# Patient Record
Sex: MALE | Race: WHITE | NOT HISPANIC OR LATINO | URBAN - METROPOLITAN AREA
[De-identification: names, ages, dates, MRNs, and addresses within clinical notes are randomized per-mention and may not be internally consistent; named-entity substitution may affect disease eponyms.]

---

## 2017-04-03 ENCOUNTER — EMERGENCY (EMERGENCY)
Facility: HOSPITAL | Age: 82
LOS: 1 days | Discharge: HOME | End: 2017-04-03
Attending: EMERGENCY MEDICINE
Payer: MEDICARE

## 2017-04-03 VITALS
HEIGHT: 61 IN | OXYGEN SATURATION: 95 % | WEIGHT: 149.91 LBS | SYSTOLIC BLOOD PRESSURE: 145 MMHG | RESPIRATION RATE: 17 BRPM | HEART RATE: 73 BPM | TEMPERATURE: 99 F | DIASTOLIC BLOOD PRESSURE: 55 MMHG

## 2017-04-03 DIAGNOSIS — Z88.0 ALLERGY STATUS TO PENICILLIN: ICD-10-CM

## 2017-04-03 DIAGNOSIS — J40 BRONCHITIS, NOT SPECIFIED AS ACUTE OR CHRONIC: ICD-10-CM

## 2017-04-03 DIAGNOSIS — Z95.5 PRESENCE OF CORONARY ANGIOPLASTY IMPLANT AND GRAFT: ICD-10-CM

## 2017-04-03 DIAGNOSIS — I25.10 ATHEROSCLEROTIC HEART DISEASE OF NATIVE CORONARY ARTERY WITHOUT ANGINA PECTORIS: ICD-10-CM

## 2017-04-03 DIAGNOSIS — E11.9 TYPE 2 DIABETES MELLITUS WITHOUT COMPLICATIONS: ICD-10-CM

## 2017-04-03 LAB
ANION GAP SERPL CALC-SCNC: 5 MMOL/L — SIGNIFICANT CHANGE UP (ref 5–17)
BUN SERPL-MCNC: 27 MG/DL — HIGH (ref 7–18)
CALCIUM SERPL-MCNC: 8.5 MG/DL — SIGNIFICANT CHANGE UP (ref 8.4–10.5)
CHLORIDE SERPL-SCNC: 107 MMOL/L — SIGNIFICANT CHANGE UP (ref 96–108)
CO2 SERPL-SCNC: 26 MMOL/L — SIGNIFICANT CHANGE UP (ref 22–31)
CREAT SERPL-MCNC: 1.09 MG/DL — SIGNIFICANT CHANGE UP (ref 0.5–1.3)
GLUCOSE SERPL-MCNC: 107 MG/DL — HIGH (ref 70–99)
HCT VFR BLD CALC: 40.5 % — SIGNIFICANT CHANGE UP (ref 39–50)
HGB BLD-MCNC: 13.6 G/DL — SIGNIFICANT CHANGE UP (ref 13–17)
LACTATE SERPL-SCNC: 0.8 MMOL/L — SIGNIFICANT CHANGE UP (ref 0.7–2)
MCHC RBC-ENTMCNC: 29.6 PG — SIGNIFICANT CHANGE UP (ref 27–34)
MCHC RBC-ENTMCNC: 33.6 GM/DL — SIGNIFICANT CHANGE UP (ref 32–36)
MCV RBC AUTO: 87.8 FL — SIGNIFICANT CHANGE UP (ref 80–100)
PLATELET # BLD AUTO: 139 K/UL — LOW (ref 150–400)
POTASSIUM SERPL-MCNC: 5.2 MMOL/L — SIGNIFICANT CHANGE UP (ref 3.5–5.3)
POTASSIUM SERPL-SCNC: 5.2 MMOL/L — SIGNIFICANT CHANGE UP (ref 3.5–5.3)
RBC # BLD: 4.61 M/UL — SIGNIFICANT CHANGE UP (ref 4.2–5.8)
RBC # FLD: 12.8 % — SIGNIFICANT CHANGE UP (ref 10.3–14.5)
SODIUM SERPL-SCNC: 138 MMOL/L — SIGNIFICANT CHANGE UP (ref 135–145)
WBC # BLD: 4 K/UL — SIGNIFICANT CHANGE UP (ref 3.8–10.5)
WBC # FLD AUTO: 4 K/UL — SIGNIFICANT CHANGE UP (ref 3.8–10.5)

## 2017-04-03 PROCEDURE — 71046 X-RAY EXAM CHEST 2 VIEWS: CPT

## 2017-04-03 PROCEDURE — 80048 BASIC METABOLIC PNL TOTAL CA: CPT

## 2017-04-03 PROCEDURE — 71020: CPT | Mod: 26

## 2017-04-03 PROCEDURE — 99283 EMERGENCY DEPT VISIT LOW MDM: CPT

## 2017-04-03 PROCEDURE — 99284 EMERGENCY DEPT VISIT MOD MDM: CPT

## 2017-04-03 PROCEDURE — 85027 COMPLETE CBC AUTOMATED: CPT

## 2017-04-03 PROCEDURE — 83605 ASSAY OF LACTIC ACID: CPT

## 2017-04-03 PROCEDURE — 36000 PLACE NEEDLE IN VEIN: CPT

## 2017-04-03 NOTE — ED PROVIDER NOTE - OBJECTIVE STATEMENT
87 y/o M pt with PMHx of DM and CAD (with coronary artery stent) and no PSHx presents to ED c/o cough x3 days. As per pt, pt was eating bread when he swallowed, resulting in the bread "going down the wrong way", causing him to immediately cough; pt ultimately coughed up the bread, but now has persistent cough. Pt denies fever, chills, nausea, vomiting, CP, or any other complaints. Allergies: Penicillin (rash).

## 2017-04-03 NOTE — ED PROVIDER NOTE - MEDICAL DECISION MAKING DETAILS
87 y/o M pt c/o cough x3 days s/p minor choking episode while swallowing bread. Possible aspiration PNA? Plan for labs, CXR, and reassess.

## 2017-04-03 NOTE — ED PROVIDER NOTE - PROGRESS NOTE DETAILS
CXR negative.  no WBC, normal lactate.  ?aspiration pneumonia.  Will d/c with abx, clinic follow up.

## 2017-04-03 NOTE — ED PROVIDER NOTE - NS ED MD SCRIBE ATTENDING SCRIBE SECTIONS
DISPOSITION/RESULTS/PAST MEDICAL/SURGICAL/SOCIAL HISTORY/VITAL SIGNS( Pullset)/PHYSICAL EXAM/HISTORY OF PRESENT ILLNESS/REVIEW OF SYSTEMS

## 2019-05-15 ENCOUNTER — EMERGENCY (EMERGENCY)
Facility: HOSPITAL | Age: 84
LOS: 1 days | Discharge: HOME | End: 2019-05-15
Attending: EMERGENCY MEDICINE
Payer: MEDICARE

## 2019-05-15 VITALS
RESPIRATION RATE: 18 BRPM | WEIGHT: 147.05 LBS | SYSTOLIC BLOOD PRESSURE: 122 MMHG | DIASTOLIC BLOOD PRESSURE: 77 MMHG | HEART RATE: 68 BPM | TEMPERATURE: 98 F | HEIGHT: 63 IN | OXYGEN SATURATION: 97 %

## 2019-05-15 VITALS
DIASTOLIC BLOOD PRESSURE: 68 MMHG | RESPIRATION RATE: 18 BRPM | HEART RATE: 72 BPM | OXYGEN SATURATION: 99 % | SYSTOLIC BLOOD PRESSURE: 119 MMHG

## 2019-05-15 DIAGNOSIS — Z90.49 ACQUIRED ABSENCE OF OTHER SPECIFIED PARTS OF DIGESTIVE TRACT: Chronic | ICD-10-CM

## 2019-05-15 LAB
ACETONE SERPL-MCNC: NEGATIVE — SIGNIFICANT CHANGE UP
ALBUMIN SERPL ELPH-MCNC: 4.1 G/DL — SIGNIFICANT CHANGE UP (ref 3.5–5)
ALP SERPL-CCNC: 93 U/L — SIGNIFICANT CHANGE UP (ref 40–120)
ALT FLD-CCNC: 34 U/L DA — SIGNIFICANT CHANGE UP (ref 10–60)
ANION GAP SERPL CALC-SCNC: 7 MMOL/L — SIGNIFICANT CHANGE UP (ref 5–17)
AST SERPL-CCNC: 27 U/L — SIGNIFICANT CHANGE UP (ref 10–40)
BASOPHILS # BLD AUTO: 0.01 K/UL — SIGNIFICANT CHANGE UP (ref 0–0.2)
BASOPHILS NFR BLD AUTO: 0.1 % — SIGNIFICANT CHANGE UP (ref 0–2)
BILIRUB SERPL-MCNC: 0.9 MG/DL — SIGNIFICANT CHANGE UP (ref 0.2–1.2)
BUN SERPL-MCNC: 31 MG/DL — HIGH (ref 7–18)
CALCIUM SERPL-MCNC: 8.7 MG/DL — SIGNIFICANT CHANGE UP (ref 8.4–10.5)
CHLORIDE SERPL-SCNC: 104 MMOL/L — SIGNIFICANT CHANGE UP (ref 96–108)
CO2 SERPL-SCNC: 28 MMOL/L — SIGNIFICANT CHANGE UP (ref 22–31)
CREAT SERPL-MCNC: 1.21 MG/DL — SIGNIFICANT CHANGE UP (ref 0.5–1.3)
EOSINOPHIL # BLD AUTO: 0.15 K/UL — SIGNIFICANT CHANGE UP (ref 0–0.5)
EOSINOPHIL NFR BLD AUTO: 1.9 % — SIGNIFICANT CHANGE UP (ref 0–6)
GLUCOSE SERPL-MCNC: 109 MG/DL — HIGH (ref 70–99)
HCT VFR BLD CALC: 42.6 % — SIGNIFICANT CHANGE UP (ref 39–50)
HGB BLD-MCNC: 14 G/DL — SIGNIFICANT CHANGE UP (ref 13–17)
IMM GRANULOCYTES NFR BLD AUTO: 0.4 % — SIGNIFICANT CHANGE UP (ref 0–1.5)
LIDOCAIN IGE QN: 277 U/L — SIGNIFICANT CHANGE UP (ref 73–393)
LYMPHOCYTES # BLD AUTO: 1.25 K/UL — SIGNIFICANT CHANGE UP (ref 1–3.3)
LYMPHOCYTES # BLD AUTO: 15.6 % — SIGNIFICANT CHANGE UP (ref 13–44)
MCHC RBC-ENTMCNC: 29.9 PG — SIGNIFICANT CHANGE UP (ref 27–34)
MCHC RBC-ENTMCNC: 32.9 GM/DL — SIGNIFICANT CHANGE UP (ref 32–36)
MCV RBC AUTO: 91 FL — SIGNIFICANT CHANGE UP (ref 80–100)
MONOCYTES # BLD AUTO: 0.76 K/UL — SIGNIFICANT CHANGE UP (ref 0–0.9)
MONOCYTES NFR BLD AUTO: 9.5 % — SIGNIFICANT CHANGE UP (ref 2–14)
NEUTROPHILS # BLD AUTO: 5.81 K/UL — SIGNIFICANT CHANGE UP (ref 1.8–7.4)
NEUTROPHILS NFR BLD AUTO: 72.5 % — SIGNIFICANT CHANGE UP (ref 43–77)
NRBC # BLD: 0 /100 WBCS — SIGNIFICANT CHANGE UP (ref 0–0)
NT-PROBNP SERPL-SCNC: 354 PG/ML — SIGNIFICANT CHANGE UP (ref 0–450)
PLATELET # BLD AUTO: 152 K/UL — SIGNIFICANT CHANGE UP (ref 150–400)
POTASSIUM SERPL-MCNC: 5.1 MMOL/L — SIGNIFICANT CHANGE UP (ref 3.5–5.3)
POTASSIUM SERPL-SCNC: 5.1 MMOL/L — SIGNIFICANT CHANGE UP (ref 3.5–5.3)
PROT SERPL-MCNC: 7.5 G/DL — SIGNIFICANT CHANGE UP (ref 6–8.3)
RBC # BLD: 4.68 M/UL — SIGNIFICANT CHANGE UP (ref 4.2–5.8)
RBC # FLD: 14 % — SIGNIFICANT CHANGE UP (ref 10.3–14.5)
SODIUM SERPL-SCNC: 139 MMOL/L — SIGNIFICANT CHANGE UP (ref 135–145)
WBC # BLD: 8.01 K/UL — SIGNIFICANT CHANGE UP (ref 3.8–10.5)
WBC # FLD AUTO: 8.01 K/UL — SIGNIFICANT CHANGE UP (ref 3.8–10.5)

## 2019-05-15 PROCEDURE — 82009 KETONE BODYS QUAL: CPT

## 2019-05-15 PROCEDURE — 83880 ASSAY OF NATRIURETIC PEPTIDE: CPT

## 2019-05-15 PROCEDURE — 99283 EMERGENCY DEPT VISIT LOW MDM: CPT

## 2019-05-15 PROCEDURE — 80053 COMPREHEN METABOLIC PANEL: CPT

## 2019-05-15 PROCEDURE — 83690 ASSAY OF LIPASE: CPT

## 2019-05-15 PROCEDURE — 85027 COMPLETE CBC AUTOMATED: CPT

## 2019-05-15 PROCEDURE — 36415 COLL VENOUS BLD VENIPUNCTURE: CPT

## 2019-05-15 RX ORDER — MULTIVIT WITH MIN/MFOLATE/K2 340-15/3 G
1 POWDER (GRAM) ORAL ONCE
Refills: 0 | Status: COMPLETED | OUTPATIENT
Start: 2019-05-15 | End: 2019-05-15

## 2019-05-15 RX ADMIN — Medication 1 BOTTLE: at 21:43

## 2019-05-15 NOTE — ED ADULT NURSE NOTE - OBJECTIVE STATEMENT
pt came in for c/o constipation x4 days. Pt denies any abdominal pain, nausea or vomiting. Breathing unlabored. NAD.

## 2019-05-15 NOTE — ED PROVIDER NOTE - CLINICAL SUMMARY MEDICAL DECISION MAKING FREE TEXT BOX
89 y/o M presents with new onset of constipation, otherwise no complaints. Offered patient CT abdomen, however patient declined. Will obtain labs and discharge home on Mg Citrate.

## 2019-05-15 NOTE — ED ADULT NURSE NOTE - CHPI ED NUR SYMPTOMS NEG
no chills/no nausea/no hematuria/no fever/no burning urination/no diarrhea/no blood in stool/no vomiting/no dysuria

## 2019-05-15 NOTE — ED PROVIDER NOTE - OBJECTIVE STATEMENT
89 y/o M with a significant PMHx of CAD with stents, DM and a significant PSHx of appendectomy presents to the ED with complaints of constipation x 2 weeks. Patient states having a history of constipation and is unable to have a bowel movement. Patient reports rectal pressure. Patient states no change in appetite or any urinary symptoms. Denies fever, abdominal pain or any other complaints. 89 y/o M with a significant PMHx of CAD with stents, DM and a significant PSHx of appendectomy presents to the ED with complaints of constipation x 2 weeks. Patient states never have a history of constipation and is unable to have a bowel movement for past 2 weeks. Patient reports rectal pressure. Patient states no change in appetite or any urinary symptoms. Denies fever, abdominal pain or any other complaints. 89 y/o M with a significant PMHx of CAD with stents, DM and a significant PSHx of appendectomy presents to the ED with complaints of constipation x 2 weeks. Patient states never have a history of constipation and is unable to have a bowel movement for past 2 weeks. Patient reports rectal pressure. Patient states no change in appetite or any urinary symptoms. Denies fever, abdominal pain or any other complaints.  Pt never had colonoscopy

## 2019-05-16 PROBLEM — I25.10 ATHEROSCLEROTIC HEART DISEASE OF NATIVE CORONARY ARTERY WITHOUT ANGINA PECTORIS: Chronic | Status: ACTIVE | Noted: 2017-04-03

## 2019-05-16 PROBLEM — Z95.5 PRESENCE OF CORONARY ANGIOPLASTY IMPLANT AND GRAFT: Chronic | Status: ACTIVE | Noted: 2017-04-03

## 2020-12-30 ENCOUNTER — TRANSCRIPTION ENCOUNTER (OUTPATIENT)
Age: 85
End: 2020-12-30

## 2021-02-16 ENCOUNTER — TRANSCRIPTION ENCOUNTER (OUTPATIENT)
Age: 86
End: 2021-02-16

## 2021-03-11 ENCOUNTER — INPATIENT (INPATIENT)
Facility: HOSPITAL | Age: 86
LOS: 3 days | Discharge: HOME | DRG: 177 | End: 2021-03-15
Attending: INTERNAL MEDICINE | Admitting: INTERNAL MEDICINE
Payer: MEDICARE

## 2021-03-11 ENCOUNTER — TRANSCRIPTION ENCOUNTER (OUTPATIENT)
Age: 86
End: 2021-03-11

## 2021-03-11 VITALS
RESPIRATION RATE: 18 BRPM | HEIGHT: 63 IN | TEMPERATURE: 99 F | HEART RATE: 98 BPM | DIASTOLIC BLOOD PRESSURE: 68 MMHG | SYSTOLIC BLOOD PRESSURE: 112 MMHG | WEIGHT: 145.06 LBS | OXYGEN SATURATION: 95 %

## 2021-03-11 DIAGNOSIS — I25.10 ATHEROSCLEROTIC HEART DISEASE OF NATIVE CORONARY ARTERY WITHOUT ANGINA PECTORIS: ICD-10-CM

## 2021-03-11 DIAGNOSIS — U07.1 COVID-19: ICD-10-CM

## 2021-03-11 DIAGNOSIS — Z29.9 ENCOUNTER FOR PROPHYLACTIC MEASURES, UNSPECIFIED: ICD-10-CM

## 2021-03-11 DIAGNOSIS — Z90.49 ACQUIRED ABSENCE OF OTHER SPECIFIED PARTS OF DIGESTIVE TRACT: Chronic | ICD-10-CM

## 2021-03-11 DIAGNOSIS — E78.5 HYPERLIPIDEMIA, UNSPECIFIED: ICD-10-CM

## 2021-03-11 DIAGNOSIS — I10 ESSENTIAL (PRIMARY) HYPERTENSION: ICD-10-CM

## 2021-03-11 DIAGNOSIS — E11.9 TYPE 2 DIABETES MELLITUS WITHOUT COMPLICATIONS: ICD-10-CM

## 2021-03-11 LAB
ALBUMIN SERPL ELPH-MCNC: 3.7 G/DL — SIGNIFICANT CHANGE UP (ref 3.5–5)
ALP SERPL-CCNC: 98 U/L — SIGNIFICANT CHANGE UP (ref 40–120)
ALT FLD-CCNC: 32 U/L DA — SIGNIFICANT CHANGE UP (ref 10–60)
ANION GAP SERPL CALC-SCNC: 4 MMOL/L — LOW (ref 5–17)
APTT BLD: 36.5 SEC — HIGH (ref 27.5–35.5)
AST SERPL-CCNC: 29 U/L — SIGNIFICANT CHANGE UP (ref 10–40)
BASOPHILS # BLD AUTO: 0 K/UL — SIGNIFICANT CHANGE UP (ref 0–0.2)
BASOPHILS NFR BLD AUTO: 0 % — SIGNIFICANT CHANGE UP (ref 0–2)
BILIRUB SERPL-MCNC: 0.7 MG/DL — SIGNIFICANT CHANGE UP (ref 0.2–1.2)
BUN SERPL-MCNC: 33 MG/DL — HIGH (ref 7–18)
CALCIUM SERPL-MCNC: 8.7 MG/DL — SIGNIFICANT CHANGE UP (ref 8.4–10.5)
CHLORIDE SERPL-SCNC: 107 MMOL/L — SIGNIFICANT CHANGE UP (ref 96–108)
CO2 SERPL-SCNC: 28 MMOL/L — SIGNIFICANT CHANGE UP (ref 22–31)
CREAT SERPL-MCNC: 1.24 MG/DL — SIGNIFICANT CHANGE UP (ref 0.5–1.3)
D DIMER BLD IA.RAPID-MCNC: 615 NG/ML DDU — HIGH
EOSINOPHIL # BLD AUTO: 0 K/UL — SIGNIFICANT CHANGE UP (ref 0–0.5)
EOSINOPHIL NFR BLD AUTO: 0 % — SIGNIFICANT CHANGE UP (ref 0–6)
GLUCOSE SERPL-MCNC: 94 MG/DL — SIGNIFICANT CHANGE UP (ref 70–99)
HCT VFR BLD CALC: 36.7 % — LOW (ref 39–50)
HGB BLD-MCNC: 12 G/DL — LOW (ref 13–17)
INR BLD: 1.04 RATIO — SIGNIFICANT CHANGE UP (ref 0.88–1.16)
LACTATE SERPL-SCNC: 1 MMOL/L — SIGNIFICANT CHANGE UP (ref 0.7–2)
LYMPHOCYTES # BLD AUTO: 0.74 K/UL — LOW (ref 1–3.3)
LYMPHOCYTES # BLD AUTO: 19 % — SIGNIFICANT CHANGE UP (ref 13–44)
MCHC RBC-ENTMCNC: 29.8 PG — SIGNIFICANT CHANGE UP (ref 27–34)
MCHC RBC-ENTMCNC: 32.7 GM/DL — SIGNIFICANT CHANGE UP (ref 32–36)
MCV RBC AUTO: 91.1 FL — SIGNIFICANT CHANGE UP (ref 80–100)
MONOCYTES # BLD AUTO: 0.86 K/UL — SIGNIFICANT CHANGE UP (ref 0–0.9)
MONOCYTES NFR BLD AUTO: 22 % — HIGH (ref 2–14)
NEUTROPHILS # BLD AUTO: 2.22 K/UL — SIGNIFICANT CHANGE UP (ref 1.8–7.4)
NEUTROPHILS NFR BLD AUTO: 57 % — SIGNIFICANT CHANGE UP (ref 43–77)
PLATELET # BLD AUTO: 130 K/UL — LOW (ref 150–400)
POTASSIUM SERPL-MCNC: 4.8 MMOL/L — SIGNIFICANT CHANGE UP (ref 3.5–5.3)
POTASSIUM SERPL-SCNC: 4.8 MMOL/L — SIGNIFICANT CHANGE UP (ref 3.5–5.3)
PROT SERPL-MCNC: 7.3 G/DL — SIGNIFICANT CHANGE UP (ref 6–8.3)
PROTHROM AB SERPL-ACNC: 12.4 SEC — SIGNIFICANT CHANGE UP (ref 10.6–13.6)
RBC # BLD: 4.03 M/UL — LOW (ref 4.2–5.8)
RBC # FLD: 14.4 % — SIGNIFICANT CHANGE UP (ref 10.3–14.5)
SARS-COV-2 RNA SPEC QL NAA+PROBE: DETECTED
SODIUM SERPL-SCNC: 139 MMOL/L — SIGNIFICANT CHANGE UP (ref 135–145)
WBC # BLD: 3.89 K/UL — SIGNIFICANT CHANGE UP (ref 3.8–10.5)
WBC # FLD AUTO: 3.89 K/UL — SIGNIFICANT CHANGE UP (ref 3.8–10.5)

## 2021-03-11 PROCEDURE — 71045 X-RAY EXAM CHEST 1 VIEW: CPT | Mod: 26

## 2021-03-11 PROCEDURE — 99285 EMERGENCY DEPT VISIT HI MDM: CPT | Mod: CS

## 2021-03-11 RX ORDER — ENOXAPARIN SODIUM 100 MG/ML
40 INJECTION SUBCUTANEOUS DAILY
Refills: 0 | Status: DISCONTINUED | OUTPATIENT
Start: 2021-03-11 | End: 2021-03-14

## 2021-03-11 RX ORDER — ATORVASTATIN CALCIUM 80 MG/1
20 TABLET, FILM COATED ORAL AT BEDTIME
Refills: 0 | Status: DISCONTINUED | OUTPATIENT
Start: 2021-03-11 | End: 2021-03-15

## 2021-03-11 RX ORDER — DEXAMETHASONE 0.5 MG/5ML
6 ELIXIR ORAL DAILY
Refills: 0 | Status: DISCONTINUED | OUTPATIENT
Start: 2021-03-11 | End: 2021-03-15

## 2021-03-11 RX ORDER — INSULIN LISPRO 100/ML
VIAL (ML) SUBCUTANEOUS
Refills: 0 | Status: DISCONTINUED | OUTPATIENT
Start: 2021-03-11 | End: 2021-03-15

## 2021-03-11 RX ORDER — DEXAMETHASONE 0.5 MG/5ML
6 ELIXIR ORAL ONCE
Refills: 0 | Status: COMPLETED | OUTPATIENT
Start: 2021-03-11 | End: 2021-03-11

## 2021-03-11 RX ORDER — ASPIRIN/CALCIUM CARB/MAGNESIUM 324 MG
81 TABLET ORAL DAILY
Refills: 0 | Status: DISCONTINUED | OUTPATIENT
Start: 2021-03-11 | End: 2021-03-15

## 2021-03-11 RX ORDER — CLOPIDOGREL BISULFATE 75 MG/1
75 TABLET, FILM COATED ORAL DAILY
Refills: 0 | Status: DISCONTINUED | OUTPATIENT
Start: 2021-03-11 | End: 2021-03-15

## 2021-03-11 RX ORDER — LOSARTAN POTASSIUM 100 MG/1
100 TABLET, FILM COATED ORAL DAILY
Refills: 0 | Status: DISCONTINUED | OUTPATIENT
Start: 2021-03-11 | End: 2021-03-15

## 2021-03-11 RX ORDER — METOPROLOL TARTRATE 50 MG
50 TABLET ORAL DAILY
Refills: 0 | Status: DISCONTINUED | OUTPATIENT
Start: 2021-03-11 | End: 2021-03-15

## 2021-03-11 RX ADMIN — ATORVASTATIN CALCIUM 20 MILLIGRAM(S): 80 TABLET, FILM COATED ORAL at 21:05

## 2021-03-11 RX ADMIN — Medication 6 MILLIGRAM(S): at 17:28

## 2021-03-11 NOTE — H&P ADULT - ATTENDING COMMENTS
89 y/o M with a significant PMHx of CAD with stents, DM and a significant PSHx of appendectomy presents to the ED after [facility] of [facility]. Pt reports cough for 2 weeks but denies fever, SOB or chest pain. In ED, pt was saturating 93-94% on RA and 88-89% on ambulation. Pt denies any N/V/D, abdominal pain, urinary symptoms or any other acute complaints.  chart rewieved  seen and examined vsstable afebrile physical alert awake lungs clear abd cns heart  wnl  dm, cad , htn  labs noted  cxr neg  covid pneumonia with hypoxia  doing ok on 2 LNC  dexa  cad, htn, dm cont same

## 2021-03-11 NOTE — H&P ADULT - PROBLEM SELECTOR PLAN 3
RISK                                                          Points  [  ] Previous VTE                                                3  [  ] Thrombophilia                                             2  [  ] Lower limb paralysis                                   2        (unable to hold up >15 seconds)    [  ] Current Cancer                                             2         (within 6 months)  [ x ] Immobilization > 24 hrs                              1  [  ] ICU/CCU stay > 24 hours                             1  [ x ] Age > 60                                                         1    IMPROVE VTE Score: 2  lovenox for VTE prophylaxis. On actos and Januvia at home  on HSS  Monitor fingerstick  f/u HbA1C

## 2021-03-11 NOTE — ED PROVIDER NOTE - PROGRESS NOTE DETAILS
covid+  labs - Hgb 12, D dimer 615  EKG - nsr, rate 56  CXR - lungs clear  PCP not affiliated. Admitted to unattached Dr Landeros and endorsed to MAR.

## 2021-03-11 NOTE — ED PROVIDER NOTE - PHYSICAL EXAMINATION
GENERAL: well appearing, no acute distress   HEAD: atraumatic   EYES: EOMI, pink conjunctiva   ENT: moist oral mucosa   CARDIAC: RRR, no edema, distal pulses present   RESPIRATORY: lungs CTAB, no increased work of breathing   GASTROINTESTINAL: no abdominal tenderness, no rebound or guarding, bowel sounds presents  GENITOURINARY: no CVA tenderness   MUSCULOSKELETAL: no deformity   NEUROLOGICAL: AAOx3, spontaneous movement of extremities, ambulatory w/ cane    SKIN: intact   PSYCHIATRIC: cooperative  HEME LYMPH: no lymphadenopathy

## 2021-03-11 NOTE — H&P ADULT - HISTORY OF PRESENT ILLNESS
89 y/o M with a significant PMHx of CAD with stents, DM and a significant PSHx of appendectomy presents to the ED w 89 y/o M with a significant PMHx of CAD with stents, DM and a significant PSHx of appendectomy presents to the ED after [facility] of [facility]. Pt reports cough for 2 weeks but denies fever, SOB or chest pain. In ED, pt was saturating 93-94% on RA and 88-89% on ambulation. Pt denies any N/V/D, abdominal pain, urinary symptoms or any other acute complaints.    In ED, /68, HR 98, SaO2 97% on 2L NC

## 2021-03-11 NOTE — H&P ADULT - NSICDXPASTMEDICALHX_GEN_ALL_CORE_FT
PAST MEDICAL HISTORY:  CAD (coronary artery disease)     DM (diabetes mellitus)     Stented coronary artery

## 2021-03-11 NOTE — H&P ADULT - PROBLEM SELECTOR PLAN 1
Came to ED after positive COVID test at [facility] [place]  Pt reports cough but denies fever or SOB  Afebrile, no leukocytosis  In ED, pt was saturating 93-94% on RA and 88-89% on ambulation  SaO2 97% on 2L NC  CXR is clear  D-dimer 615  Will start on decadron  f/u CRP, procalcitonin, ferritin  f/u COVID ab results  Monitor Oxygen saturation

## 2021-03-11 NOTE — ED PROVIDER NOTE - OBJECTIVE STATEMENT
89 yo M pmh of "lung problem", remote hx of MI, hard of hearing, presents from  with diagnosis of covid on rapid test. Per pt, he was around someone with covid approx 3 weeks ago. Has had cough x 2 days. Denies other acute complaints.

## 2021-03-11 NOTE — ED PROVIDER NOTE - CLINICAL SUMMARY MEDICAL DECISION MAKING FREE TEXT BOX
89 yo M with covid+ test today. Sat 93% on RA and 89% w/ ambulation. Will give oxygen, decadron, labs, CXR, EKG, admit.

## 2021-03-11 NOTE — H&P ADULT - ASSESSMENT
89 y/o M with a significant PMHx of CAD with stents, DM and a significant PSHx of appendectomy presents to the ED after [facility] of [facility]. Pt reports cough for 2 weeks but denies fever, SOB or chest pain. In ED, pt was saturating 93-94% on RA and 88-89% on ambulation. Pt denies any N/V/D, abdominal pain, urinary symptoms or any other acute complaints.    In ED, /68, HR 98, SaO2 97% on 2L NC

## 2021-03-12 DIAGNOSIS — Z02.9 ENCOUNTER FOR ADMINISTRATIVE EXAMINATIONS, UNSPECIFIED: ICD-10-CM

## 2021-03-12 LAB
A1C WITH ESTIMATED AVERAGE GLUCOSE RESULT: 6 % — HIGH (ref 4–5.6)
ALBUMIN SERPL ELPH-MCNC: 3.2 G/DL — LOW (ref 3.5–5)
ALP SERPL-CCNC: 85 U/L — SIGNIFICANT CHANGE UP (ref 40–120)
ALT FLD-CCNC: 28 U/L DA — SIGNIFICANT CHANGE UP (ref 10–60)
ANION GAP SERPL CALC-SCNC: 7 MMOL/L — SIGNIFICANT CHANGE UP (ref 5–17)
AST SERPL-CCNC: 22 U/L — SIGNIFICANT CHANGE UP (ref 10–40)
BASOPHILS # BLD AUTO: 0 K/UL — SIGNIFICANT CHANGE UP (ref 0–0.2)
BASOPHILS NFR BLD AUTO: 0 % — SIGNIFICANT CHANGE UP (ref 0–2)
BILIRUB SERPL-MCNC: 0.6 MG/DL — SIGNIFICANT CHANGE UP (ref 0.2–1.2)
BUN SERPL-MCNC: 32 MG/DL — HIGH (ref 7–18)
CALCIUM SERPL-MCNC: 8.5 MG/DL — SIGNIFICANT CHANGE UP (ref 8.4–10.5)
CHLORIDE SERPL-SCNC: 107 MMOL/L — SIGNIFICANT CHANGE UP (ref 96–108)
CHOLEST SERPL-MCNC: 108 MG/DL — SIGNIFICANT CHANGE UP
CO2 SERPL-SCNC: 25 MMOL/L — SIGNIFICANT CHANGE UP (ref 22–31)
CREAT SERPL-MCNC: 0.85 MG/DL — SIGNIFICANT CHANGE UP (ref 0.5–1.3)
CRP SERPL-MCNC: 4 MG/L — SIGNIFICANT CHANGE UP
EOSINOPHIL # BLD AUTO: 0 K/UL — SIGNIFICANT CHANGE UP (ref 0–0.5)
EOSINOPHIL NFR BLD AUTO: 0 % — SIGNIFICANT CHANGE UP (ref 0–6)
ESTIMATED AVERAGE GLUCOSE: 126 MG/DL — HIGH (ref 68–114)
FERRITIN SERPL-MCNC: 101 NG/ML — SIGNIFICANT CHANGE UP (ref 30–400)
FOLATE SERPL-MCNC: >20 NG/ML — SIGNIFICANT CHANGE UP
GLUCOSE BLDC GLUCOMTR-MCNC: 111 MG/DL — HIGH (ref 70–99)
GLUCOSE BLDC GLUCOMTR-MCNC: 122 MG/DL — HIGH (ref 70–99)
GLUCOSE BLDC GLUCOMTR-MCNC: 133 MG/DL — HIGH (ref 70–99)
GLUCOSE BLDC GLUCOMTR-MCNC: 270 MG/DL — HIGH (ref 70–99)
GLUCOSE SERPL-MCNC: 111 MG/DL — HIGH (ref 70–99)
HCT VFR BLD CALC: 34.1 % — LOW (ref 39–50)
HDLC SERPL-MCNC: 48 MG/DL — SIGNIFICANT CHANGE UP
HGB BLD-MCNC: 11.1 G/DL — LOW (ref 13–17)
IMM GRANULOCYTES NFR BLD AUTO: 0.7 % — SIGNIFICANT CHANGE UP (ref 0–1.5)
LIPID PNL WITH DIRECT LDL SERPL: 53 MG/DL — SIGNIFICANT CHANGE UP
LYMPHOCYTES # BLD AUTO: 0.63 K/UL — LOW (ref 1–3.3)
LYMPHOCYTES # BLD AUTO: 21.2 % — SIGNIFICANT CHANGE UP (ref 13–44)
MAGNESIUM SERPL-MCNC: 2.1 MG/DL — SIGNIFICANT CHANGE UP (ref 1.6–2.6)
MCHC RBC-ENTMCNC: 29.1 PG — SIGNIFICANT CHANGE UP (ref 27–34)
MCHC RBC-ENTMCNC: 32.6 GM/DL — SIGNIFICANT CHANGE UP (ref 32–36)
MCV RBC AUTO: 89.3 FL — SIGNIFICANT CHANGE UP (ref 80–100)
MONOCYTES # BLD AUTO: 0.32 K/UL — SIGNIFICANT CHANGE UP (ref 0–0.9)
MONOCYTES NFR BLD AUTO: 10.8 % — SIGNIFICANT CHANGE UP (ref 2–14)
NEUTROPHILS # BLD AUTO: 2 K/UL — SIGNIFICANT CHANGE UP (ref 1.8–7.4)
NEUTROPHILS NFR BLD AUTO: 67.3 % — SIGNIFICANT CHANGE UP (ref 43–77)
NON HDL CHOLESTEROL: 60 MG/DL — SIGNIFICANT CHANGE UP
NRBC # BLD: 0 /100 WBCS — SIGNIFICANT CHANGE UP (ref 0–0)
PHOSPHATE SERPL-MCNC: 4.9 MG/DL — HIGH (ref 2.5–4.5)
PLATELET # BLD AUTO: 109 K/UL — LOW (ref 150–400)
POTASSIUM SERPL-MCNC: 4.1 MMOL/L — SIGNIFICANT CHANGE UP (ref 3.5–5.3)
POTASSIUM SERPL-SCNC: 4.1 MMOL/L — SIGNIFICANT CHANGE UP (ref 3.5–5.3)
PROCALCITONIN SERPL-MCNC: 0.05 NG/ML — SIGNIFICANT CHANGE UP (ref 0.02–0.1)
PROT SERPL-MCNC: 6.5 G/DL — SIGNIFICANT CHANGE UP (ref 6–8.3)
RBC # BLD: 3.82 M/UL — LOW (ref 4.2–5.8)
RBC # FLD: 13.9 % — SIGNIFICANT CHANGE UP (ref 10.3–14.5)
SODIUM SERPL-SCNC: 139 MMOL/L — SIGNIFICANT CHANGE UP (ref 135–145)
TRIGL SERPL-MCNC: 35 MG/DL — SIGNIFICANT CHANGE UP
TSH SERPL-MCNC: 1.43 UU/ML — SIGNIFICANT CHANGE UP (ref 0.34–4.82)
VIT B12 SERPL-MCNC: 830 PG/ML — SIGNIFICANT CHANGE UP (ref 232–1245)
WBC # BLD: 2.97 K/UL — LOW (ref 3.8–10.5)
WBC # FLD AUTO: 2.97 K/UL — LOW (ref 3.8–10.5)

## 2021-03-12 RX ADMIN — Medication 81 MILLIGRAM(S): at 11:48

## 2021-03-12 RX ADMIN — CLOPIDOGREL BISULFATE 75 MILLIGRAM(S): 75 TABLET, FILM COATED ORAL at 11:48

## 2021-03-12 RX ADMIN — LOSARTAN POTASSIUM 100 MILLIGRAM(S): 100 TABLET, FILM COATED ORAL at 06:20

## 2021-03-12 RX ADMIN — Medication 3: at 11:41

## 2021-03-12 RX ADMIN — Medication 6 MILLIGRAM(S): at 06:21

## 2021-03-12 RX ADMIN — ATORVASTATIN CALCIUM 20 MILLIGRAM(S): 80 TABLET, FILM COATED ORAL at 21:31

## 2021-03-12 RX ADMIN — ENOXAPARIN SODIUM 40 MILLIGRAM(S): 100 INJECTION SUBCUTANEOUS at 11:42

## 2021-03-12 NOTE — PROGRESS NOTE ADULT - ASSESSMENT
Patient 89 y/o M with a significant PMHx of CAD with stents, DM and a significant PSHx of appendectomy presents to the ED after diagnosis of COVID [facility]. Pt reports cough for 2 weeks but denies fever, SOB or chest pain. Patient admitted to medicine with diagnosis of COVID-19 and was given supplemental oxygen via NC. Patient was started on decadron 6mg IV pending COVID antibodies testing. PT and SW consulted pending recommendations.    Patient seen and examined at bedside, denies any N/V/D, abdominal pain, urinary symptoms or any other acute complaints. Patient saturating 97% on RA, denies shortness of breath.

## 2021-03-12 NOTE — CONSULT NOTE ADULT - SUBJECTIVE AND OBJECTIVE BOX
Time of visit:    CHIEF COMPLAINT: Patient is a 89y old  Male who presents with a chief complaint of     HPI:  89 y/o M with a significant PMHx of CAD with stents, DM and a significant PSHx of appendectomy presents to the ED after diagnosis of COVID [facility]. Pt reports cough for 2 weeks but denies fever, SOB or chest pain. In ED, pt was saturating 93-94% on RA and 88-89% on ambulation. Pt denies any N/V/D, abdominal pain, urinary symptoms or any other acute complaints.    In ED, /68, HR 98, SaO2 97% on 2L NC (11 Mar 2021 18:10)   Patient seen and examined.     PAST MEDICAL & SURGICAL HISTORY:  Stented coronary artery    CAD (coronary artery disease)    DM (diabetes mellitus)    S/P appendectomy        Allergies    penicillin (Rash)    Intolerances        MEDICATIONS  (STANDING):  aspirin enteric coated 81 milliGRAM(s) Oral daily  atorvastatin 20 milliGRAM(s) Oral at bedtime  clopidogrel Tablet 75 milliGRAM(s) Oral daily  dexAMETHasone  Injectable 6 milliGRAM(s) IV Push daily  enoxaparin Injectable 40 milliGRAM(s) SubCutaneous daily  insulin lispro (ADMELOG) corrective regimen sliding scale   SubCutaneous three times a day before meals  losartan 100 milliGRAM(s) Oral daily  metoprolol succinate ER 50 milliGRAM(s) Oral daily      MEDICATIONS  (PRN):   Medications up to date at time of exam.    Medications up to date at time of exam.    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    CARDIOVASCULAR:  denies pressure, squeezing, tightness, or heaviness about the chest; no palpitations.    RESPIRATORY:  denies SOB, cough, TURPIN, wheezing.    GASTROINTESTINAL:  denies abdominal pain, nausea, vomiting or diarrhea.    GENITOURINARY: denies dysuria, frequency or urgency.    NEUROLOGIC:  denies numbness, tingling, seizures or weakness.    PSYCHIATRIC:  denies disorder of thought or mood.    MSK: denies swelling, redness      PHYSICAL EXAMINATION:    GENERAL: The patient is a well-developed, well-nourished, in no apparent distress.     Vital Signs Last 24 Hrs  T(C): 37.1 (12 Mar 2021 14:25), Max: 37.1 (12 Mar 2021 14:25)  T(F): 98.7 (12 Mar 2021 14:25), Max: 98.7 (12 Mar 2021 14:25)  HR: 56 (12 Mar 2021 14:25) (50 - 76)  BP: 128/47 (12 Mar 2021 14:25) (128/47 - 182/77)  BP(mean): --  RR: 17 (12 Mar 2021 14:25) (17 - 18)  SpO2: 99% (12 Mar 2021 14:25) (98% - 100%)   (if applicable)    Chest Tube (if applicable)    HEENT: Head is normocephalic and atraumatic. Extraocular muscles are intact. Mucous membranes are moist.     NECK: Supple, no palpable adenopathy.    LUNGS: Clear to auscultation, no wheezing, rales, or rhonchi.    HEART: Regular rate and rhythm without murmur.    ABDOMEN: Soft, nontender, and nondistended.  No hepatosplenomegaly is noted.    RENAL: No difficulty voiding, no pelvic pain    EXTREMITIES: Without any cyanosis, clubbing, rash, lesions or edema.    NEUROLOGIC: Awake, alert, oriented, grossly intact    SKIN: Warm, dry, good turgor.      LABS:                        11.1   2.97  )-----------( 109      ( 12 Mar 2021 07:07 )             34.1     03-12    139  |  107  |  32<H>  ----------------------------<  111<H>  4.1   |  25  |  0.85    Ca    8.5      12 Mar 2021 07:07  Phos  4.9     03-12  Mg     2.1     03-12    TPro  6.5  /  Alb  3.2<L>  /  TBili  0.6  /  DBili  x   /  AST  22  /  ALT  28  /  AlkPhos  85  03-12    PT/INR - ( 11 Mar 2021 16:44 )   PT: 12.4 sec;   INR: 1.04 ratio         PTT - ( 11 Mar 2021 16:44 )  PTT:36.5 sec                Procalcitonin, Serum: 0.05 ng/mL (03-11-21 @ 23:59)      MICROBIOLOGY: (if applicable)  covid-19;COVID-19 PCR: Detected:    RADIOLOGY & ADDITIONAL STUDIES:  EKG:   CXR:< from: Xray Chest 1 View- PORTABLE-Urgent (Xray Chest 1 View- PORTABLE-Urgent .) (03.11.21 @ 15:51) >    EXAM:  XR CHEST PORTABLE URGENT 1V                            PROCEDURE DATE:  03/11/2021          INTERPRETATION:  Portable chest x-ray    Indication: cough    Comparison: 4/3/2017    Portable chest x-ray is acquired for evaluation.    Impression:No evidence for acute pulmonary infiltrate, pleural effusion, or pneumothorax.    Stable cardiac silhouette.    No pulmonary vascular congestion.    Stable mild elevation of the right hemidiaphragm.    Degenerative joint disease of both shoulders.                CIELO MITCHELL MD; Attending Radiologist  This document has been electronically signed. Mar 11 2021  3:55PM    < end of copied text >    ECHO:    IMPRESSION: 89y Male PAST MEDICAL & SURGICAL HISTORY:  Stented coronary artery    CAD (coronary artery disease)    DM (diabetes mellitus)    S/P appendectomy     p/w           IMP: This is a 89 yr old white man with HTN, DM CAD admitted for pna due to covid-19 infection and hypoxic resp failure with activity requiring supp O2. Thrombocytopenia probable    due to covid-19 infection       Sugg  -monitor pulse oximetry  -O2 supp as needed and with activity   -decadron 6 mg /day x 10 days then taper  -not on remdesivir since covid-19 antibody is pending   -blood sugar control   -monitor PLT  -isolation : contact and airborne   -monitor biomarkers TIW

## 2021-03-12 NOTE — PROGRESS NOTE ADULT - ASSESSMENT
seen and examined vsstable afebrile physical done   pt is comfortable on bed not in any distress on 2 lnc  denies any complaints  no cp or sob  labs noted hgb 11.1platelets 130 t0 109 a1c 6.0  covid pneumonia on  dexa  dm cont same   oob in chair with marilu  spirometer

## 2021-03-12 NOTE — PROGRESS NOTE ADULT - PROBLEM SELECTOR PLAN 1
Came to ED after positive COVID-[facility] test [facility]  Pt reports cough but denies fever or SOB  Afebrile, no leukocytosis  In ED, pt was saturating 93-94% on RA and 88-89% on ambulation  SaO2 97% on RA  CXR is clear  D-dimer 615  c/w decadron 6mg IV  f/u CRP, procalcitonin, ferritin  f/u COVID ab results  Monitor Oxygen saturation.

## 2021-03-13 LAB
ANION GAP SERPL CALC-SCNC: 6 MMOL/L — SIGNIFICANT CHANGE UP (ref 5–17)
BUN SERPL-MCNC: 40 MG/DL — HIGH (ref 7–18)
CALCIUM SERPL-MCNC: 8.4 MG/DL — SIGNIFICANT CHANGE UP (ref 8.4–10.5)
CHLORIDE SERPL-SCNC: 108 MMOL/L — SIGNIFICANT CHANGE UP (ref 96–108)
CO2 SERPL-SCNC: 27 MMOL/L — SIGNIFICANT CHANGE UP (ref 22–31)
CREAT SERPL-MCNC: 1.09 MG/DL — SIGNIFICANT CHANGE UP (ref 0.5–1.3)
GLUCOSE BLDC GLUCOMTR-MCNC: 121 MG/DL — HIGH (ref 70–99)
GLUCOSE BLDC GLUCOMTR-MCNC: 127 MG/DL — HIGH (ref 70–99)
GLUCOSE BLDC GLUCOMTR-MCNC: 131 MG/DL — HIGH (ref 70–99)
GLUCOSE BLDC GLUCOMTR-MCNC: 177 MG/DL — HIGH (ref 70–99)
GLUCOSE SERPL-MCNC: 84 MG/DL — SIGNIFICANT CHANGE UP (ref 70–99)
HCT VFR BLD CALC: 32.5 % — LOW (ref 39–50)
HGB BLD-MCNC: 10.5 G/DL — LOW (ref 13–17)
MCHC RBC-ENTMCNC: 29.2 PG — SIGNIFICANT CHANGE UP (ref 27–34)
MCHC RBC-ENTMCNC: 32.3 GM/DL — SIGNIFICANT CHANGE UP (ref 32–36)
MCV RBC AUTO: 90.5 FL — SIGNIFICANT CHANGE UP (ref 80–100)
NRBC # BLD: 0 /100 WBCS — SIGNIFICANT CHANGE UP (ref 0–0)
PLATELET # BLD AUTO: 100 K/UL — LOW (ref 150–400)
POTASSIUM SERPL-MCNC: 4.2 MMOL/L — SIGNIFICANT CHANGE UP (ref 3.5–5.3)
POTASSIUM SERPL-SCNC: 4.2 MMOL/L — SIGNIFICANT CHANGE UP (ref 3.5–5.3)
RBC # BLD: 3.59 M/UL — LOW (ref 4.2–5.8)
RBC # FLD: 14.1 % — SIGNIFICANT CHANGE UP (ref 10.3–14.5)
SODIUM SERPL-SCNC: 141 MMOL/L — SIGNIFICANT CHANGE UP (ref 135–145)
WBC # BLD: 3.58 K/UL — LOW (ref 3.8–10.5)
WBC # FLD AUTO: 3.58 K/UL — LOW (ref 3.8–10.5)

## 2021-03-13 RX ADMIN — LOSARTAN POTASSIUM 100 MILLIGRAM(S): 100 TABLET, FILM COATED ORAL at 05:25

## 2021-03-13 RX ADMIN — Medication 1: at 11:44

## 2021-03-13 RX ADMIN — Medication 81 MILLIGRAM(S): at 11:45

## 2021-03-13 RX ADMIN — ENOXAPARIN SODIUM 40 MILLIGRAM(S): 100 INJECTION SUBCUTANEOUS at 11:45

## 2021-03-13 RX ADMIN — Medication 6 MILLIGRAM(S): at 05:25

## 2021-03-13 RX ADMIN — ATORVASTATIN CALCIUM 20 MILLIGRAM(S): 80 TABLET, FILM COATED ORAL at 21:31

## 2021-03-13 RX ADMIN — CLOPIDOGREL BISULFATE 75 MILLIGRAM(S): 75 TABLET, FILM COATED ORAL at 11:45

## 2021-03-13 NOTE — PROGRESS NOTE ADULT - ASSESSMENT
89 y/o M with a significant PMHx of CAD with stents, DM and a significant PSHx of appendectomy presents to the ED after [facility] of [facility]. Pt reports cough for 2 weeks but denies fever, SOB or chest pain. In ED, pt was saturating 93-94% on RA and 88-89% on ambulation. Pt denies any N/V/D, abdominal pain, urinary symptoms or any other acute complaints.  seen and examined vsstable  pt is comfortable on bed  not in any distress saturating well with 2 lnc   oob in chair   pulm exercise  spirometer  possible d/c on monday    Physical tx eval

## 2021-03-14 ENCOUNTER — TRANSCRIPTION ENCOUNTER (OUTPATIENT)
Age: 86
End: 2021-03-14

## 2021-03-14 LAB
ANION GAP SERPL CALC-SCNC: 8 MMOL/L — SIGNIFICANT CHANGE UP (ref 5–17)
APPEARANCE UR: CLEAR — SIGNIFICANT CHANGE UP
BACTERIA # UR AUTO: ABNORMAL /HPF
BILIRUB UR-MCNC: NEGATIVE — SIGNIFICANT CHANGE UP
BUN SERPL-MCNC: 35 MG/DL — HIGH (ref 7–18)
CALCIUM SERPL-MCNC: 8.6 MG/DL — SIGNIFICANT CHANGE UP (ref 8.4–10.5)
CHLORIDE SERPL-SCNC: 105 MMOL/L — SIGNIFICANT CHANGE UP (ref 96–108)
CO2 SERPL-SCNC: 26 MMOL/L — SIGNIFICANT CHANGE UP (ref 22–31)
COLOR SPEC: YELLOW — SIGNIFICANT CHANGE UP
CREAT SERPL-MCNC: 1 MG/DL — SIGNIFICANT CHANGE UP (ref 0.5–1.3)
DIFF PNL FLD: ABNORMAL
EPI CELLS # UR: SIGNIFICANT CHANGE UP /HPF
GLUCOSE BLDC GLUCOMTR-MCNC: 104 MG/DL — HIGH (ref 70–99)
GLUCOSE BLDC GLUCOMTR-MCNC: 113 MG/DL — HIGH (ref 70–99)
GLUCOSE BLDC GLUCOMTR-MCNC: 289 MG/DL — HIGH (ref 70–99)
GLUCOSE SERPL-MCNC: 78 MG/DL — SIGNIFICANT CHANGE UP (ref 70–99)
GLUCOSE UR QL: 100 MG/DL
HCT VFR BLD CALC: 34.8 % — LOW (ref 39–50)
HGB BLD-MCNC: 11.4 G/DL — LOW (ref 13–17)
KETONES UR-MCNC: NEGATIVE — SIGNIFICANT CHANGE UP
LEUKOCYTE ESTERASE UR-ACNC: NEGATIVE — SIGNIFICANT CHANGE UP
MCHC RBC-ENTMCNC: 29 PG — SIGNIFICANT CHANGE UP (ref 27–34)
MCHC RBC-ENTMCNC: 32.8 GM/DL — SIGNIFICANT CHANGE UP (ref 32–36)
MCV RBC AUTO: 88.5 FL — SIGNIFICANT CHANGE UP (ref 80–100)
NITRITE UR-MCNC: NEGATIVE — SIGNIFICANT CHANGE UP
NRBC # BLD: 0 /100 WBCS — SIGNIFICANT CHANGE UP (ref 0–0)
PH UR: 5 — SIGNIFICANT CHANGE UP (ref 5–8)
PLATELET # BLD AUTO: 103 K/UL — LOW (ref 150–400)
POTASSIUM SERPL-MCNC: 4.1 MMOL/L — SIGNIFICANT CHANGE UP (ref 3.5–5.3)
POTASSIUM SERPL-SCNC: 4.1 MMOL/L — SIGNIFICANT CHANGE UP (ref 3.5–5.3)
PROT UR-MCNC: NEGATIVE — SIGNIFICANT CHANGE UP
RBC # BLD: 3.93 M/UL — LOW (ref 4.2–5.8)
RBC # FLD: 13.8 % — SIGNIFICANT CHANGE UP (ref 10.3–14.5)
RBC CASTS # UR COMP ASSIST: ABNORMAL /HPF (ref 0–2)
SODIUM SERPL-SCNC: 139 MMOL/L — SIGNIFICANT CHANGE UP (ref 135–145)
SP GR SPEC: 1.01 — SIGNIFICANT CHANGE UP (ref 1.01–1.02)
UROBILINOGEN FLD QL: NEGATIVE — SIGNIFICANT CHANGE UP
WBC # BLD: 3.66 K/UL — LOW (ref 3.8–10.5)
WBC # FLD AUTO: 3.66 K/UL — LOW (ref 3.8–10.5)
WBC UR QL: SIGNIFICANT CHANGE UP /HPF (ref 0–5)

## 2021-03-14 RX ORDER — ENOXAPARIN SODIUM 100 MG/ML
40 INJECTION SUBCUTANEOUS DAILY
Refills: 0 | Status: DISCONTINUED | OUTPATIENT
Start: 2021-03-15 | End: 2021-03-15

## 2021-03-14 RX ADMIN — Medication 50 MILLIGRAM(S): at 05:26

## 2021-03-14 RX ADMIN — CLOPIDOGREL BISULFATE 75 MILLIGRAM(S): 75 TABLET, FILM COATED ORAL at 12:17

## 2021-03-14 RX ADMIN — ATORVASTATIN CALCIUM 20 MILLIGRAM(S): 80 TABLET, FILM COATED ORAL at 21:14

## 2021-03-14 RX ADMIN — Medication 3: at 11:54

## 2021-03-14 RX ADMIN — LOSARTAN POTASSIUM 100 MILLIGRAM(S): 100 TABLET, FILM COATED ORAL at 05:26

## 2021-03-14 RX ADMIN — Medication 81 MILLIGRAM(S): at 12:17

## 2021-03-14 RX ADMIN — Medication 6 MILLIGRAM(S): at 05:27

## 2021-03-14 NOTE — CHART NOTE - NSCHARTNOTEFT_GEN_A_CORE
Patient 89 y/o M with a significant PMHx of CAD with stents, DM and a significant PSHx of appendectomy presents to the ED after [facility] of [facility] on 3/11/2021    EVENT - RN called reporting patient having hematuria    Vital Signs Last 24 Hrs  T(C): 36.6 (14 Mar 2021 05:25), Max: 36.9 (13 Mar 2021 14:10)  T(F): 97.9 (14 Mar 2021 05:25), Max: 98.5 (13 Mar 2021 14:10)  HR: 63 (14 Mar 2021 05:25) (62 - 97)  BP: 149/59 (14 Mar 2021 05:25) (135/58 - 166/69)  BP(mean): --  RR: 18 (14 Mar 2021 05:25) (18 - 19)  SpO2: 100% (14 Mar 2021 05:25) (95% - 100%)      LABS:  cret                        11.4   3.66  )-----------( 103      ( 14 Mar 2021 06:57 )             34.8     03-14    139  |  105  |  35<H>  ----------------------------<  78  4.1   |  26  |  1.00    Ca    8.6      14 Mar 2021 06:57         MEDICATIONS  (STANDING):  aspirin enteric coated 81 milliGRAM(s) Oral daily  atorvastatin 20 milliGRAM(s) Oral at bedtime  clopidogrel Tablet 75 milliGRAM(s) Oral daily  dexAMETHasone  Injectable 6 milliGRAM(s) IV Push daily  insulin lispro (ADMELOG) corrective regimen sliding scale   SubCutaneous three times a day before meals  losartan 100 milliGRAM(s) Oral daily  metoprolol succinate ER 50 milliGRAM(s) Oral daily    MEDICATIONS  (PRN):      __________________________________________________  REVIEW OF SYSTEMS:    CONSTITUTIONAL: No fever,   EYES: no acute visual disturbances  NECK: No pain or stiffness  RESPIRATORY: No cough; No shortness of breath  CARDIOVASCULAR: No chest pain, no palpitations  GASTROINTESTINAL: No pain. No nausea or vomiting; No diarrhea   NEUROLOGICAL: No headache or numbness, no tremors  MUSCULOSKELETAL: No joint pain, no muscle pain  GENITOURINARY: C/O dysuria right before he voided , tissue at bedside blood tinged. He reports that he had the same problem last year and medication given by doctor helped him  PSYCHIATRY: no depression , no anxiety  ALL OTHER  ROS negative          ________________________________________________  PHYSICAL EXAM:  GENERAL: NAD  HEENT: Normocephalic;  conjunctivae and sclerae clear; moist mucous membranes;   NECK : supple  CHEST/LUNG: Clear to auscultation bilaterally with good air entry   HEART: S1 S2  regular; no murmurs,  ABDOMEN: Soft, Nontender, Nondistended; Bowel sounds present   - Lower abdomen non distended on palpation. Denies pain  EXTREMITIES: no cyanosis; no edema; no calf tenderness  SKIN: warm and dry; no rash  NERVOUS SYSTEM:  Awake and alert; Oriented  to place, person and time ; no new deficits    PLAN    Will obtain Urinalysis and Urine culture   Will hold off on Lovenox X 24 hours   Will follow up lab results  If problem persists , or patient unable to void will obtain urology coNsult  Above plans were discussed with Patient 89 y/o M with a significant PMHx of CAD with stents, DM and a significant PSHx of appendectomy presents to the ED after diagnosis of COVID [facility]11/2021    EVENT - RN called reporting patient having hematuria    Vital Signs Last 24 Hrs  T(C): 36.6 (14 Mar 2021 05:25), Max: 36.9 (13 Mar 2021 14:10)  T(F): 97.9 (14 Mar 2021 05:25), Max: 98.5 (13 Mar 2021 14:10)  HR: 63 (14 Mar 2021 05:25) (62 - 97)  BP: 149/59 (14 Mar 2021 05:25) (135/58 - 166/69)  BP(mean): --  RR: 18 (14 Mar 2021 05:25) (18 - 19)  SpO2: 100% (14 Mar 2021 05:25) (95% - 100%)      LABS:  cret                        11.4   3.66  )-----------( 103      ( 14 Mar 2021 06:57 )             34.8     03-14    139  |  105  |  35<H>  ----------------------------<  78  4.1   |  26  |  1.00    Ca    8.6      14 Mar 2021 06:57         MEDICATIONS  (STANDING):  aspirin enteric coated 81 milliGRAM(s) Oral daily  atorvastatin 20 milliGRAM(s) Oral at bedtime  clopidogrel Tablet 75 milliGRAM(s) Oral daily  dexAMETHasone  Injectable 6 milliGRAM(s) IV Push daily  insulin lispro (ADMELOG) corrective regimen sliding scale   SubCutaneous three times a day before meals  losartan 100 milliGRAM(s) Oral daily  metoprolol succinate ER 50 milliGRAM(s) Oral daily    MEDICATIONS  (PRN):      __________________________________________________  REVIEW OF SYSTEMS:    CONSTITUTIONAL: No fever,   EYES: no acute visual disturbances  NECK: No pain or stiffness  RESPIRATORY: No cough; No shortness of breath  CARDIOVASCULAR: No chest pain, no palpitations  GASTROINTESTINAL: No pain. No nausea or vomiting; No diarrhea   NEUROLOGICAL: No headache or numbness, no tremors  MUSCULOSKELETAL: No joint pain, no muscle pain  GENITOURINARY: C/O dysuria right before he voided , tissue at bedside blood tinged. He reports that he had the same problem last year and medication given by doctor helped him  PSYCHIATRY: no depression , no anxiety  ALL OTHER  ROS negative          ________________________________________________  PHYSICAL EXAM:  GENERAL: NAD  HEENT: Normocephalic;  conjunctivae and sclerae clear; moist mucous membranes;   NECK : supple  CHEST/LUNG: Clear to auscultation bilaterally with good air entry   HEART: S1 S2  regular; no murmurs,  ABDOMEN: Soft, Nontender, Nondistended; Bowel sounds present   - Lower abdomen non distended on palpation. Denies pain  EXTREMITIES: no cyanosis; no edema; no calf tenderness  SKIN: warm and dry; no rash  NERVOUS SYSTEM:  Awake and alert; Oriented  to place, person and time ; no new deficits    PLAN    Will obtain Urinalysis and Urine culture   Will hold off on Lovenox X 24 hours   Will follow up lab results  If problem persists , or patient unable to void will obtain urology consult  Above plans were discussed with Dr Tigre Peace  Will continue to follow

## 2021-03-14 NOTE — DISCHARGE NOTE PROVIDER - HOSPITAL COURSE
89 y/o M with a significant PMHx of CAD with stents, DM and a significant PSHx of appendectomy presents to the ED after diagnosis of [facility]. Pt reports cough for 2 weeks but denies fever, SOB or chest pain. In ED, pt was saturating 93-94% on RA and 88-89% on ambulation. Chest X-ray revealed no evidence for acute pulmonary infiltrate, pleural effusion, or pneumothorax. Started on Dexamethasone. On 3/14/2021 patient had mild hematuria, urinalysis , urine culture results pending. PT evaluated and was recommended discharge to [facility]      INCOMPLETE   89 y/o M with a significant PMHx of CAD with stents, DM and a significant PSHx of appendectomy presents to the ED after diagnosis of COVID [facility]. Pt reports cough for 2 weeks but denies fever, SOB or chest pain. In ED, pt was saturating 93-94% on RA and 88-89% on ambulation. Chest X-ray revealed no evidence for acute pulmonary infiltrate, pleural effusion, or pneumothorax. Started on Dexamethasone. On 3/14/2021 patient had mild hematuria, urinalysis , urine culture results pending. PT evaluated and was recommended discharge to [facility]    SPO2 99% on room air, 93% on ambulation.    Discussed with attending.    Patient medically stable for discharge.    For full hospital course, please see medical record.

## 2021-03-14 NOTE — DISCHARGE NOTE PROVIDER - NSDCCPCAREPLAN_GEN_ALL_CORE_FT
PRINCIPAL DISCHARGE DIAGNOSIS  Diagnosis: COVID-19  Assessment and Plan of Treatment: CORONAVIRUS INSTRUCTIONS:   Based on your current clinical status and stability, it has been determined that you no longer need hospitalization and can recover while remaining in self-quarantine at home. You should follow the prevention steps below until a healthcare provider or local or state health department says you can return to your normal activities.   1. You should restrict activities outside your home, except for getting medical care.   2. Do not go to work, school, or public areas.   3. Avoid using public transportation, ride-sharing, or taxis.   4. Separate yourself from other people and animals in your home as much as possible.  When you are around other people (e.g., sharing a room or vehicle) you should wear a facemask.  5. Wash your hands often with soap and water for at least 20 seconds, especially after blowing your nose, coughing, or sneezing; going to the bathroom; and before eating or preparing food.  6. Cover your mouth and nose with a tissue when you cough or sneeze. Throw used tissues in a lined trash can. Immediately wash your hands with soap and water for at least 20 seconds  7. High touch surfaces include counters, tabletops, doorknobs, bathroom fixtures, toilets, phones, keyboards, tablets, and bedside tables.  8. Avoid sharing dishes, drinking glasses, cups, eating utensils, towels, or bedding with other people or pets in your home. After using these items, they should be washed thoroughly with soap and water.  You are strongly advised to seek prompt medical attention if your illness worsens or you develop new symptoms like fever or difficulty breathing.   Please call  [phone] to speak with your discharging physician if you have any questions.        SECONDARY DISCHARGE DIAGNOSES  Diagnosis: HTN (hypertension)  Assessment and Plan of Treatment: Low salt diet  Activity as tolerated.  Take all medication as prescribed.  Follow up with your medical doctor for routine blood pressure monitoring at your next visit.  Notify your doctor if you have any of the following symptoms:   Dizziness, Lightheadedness, Blurry vision, Headache, Chest pain, Shortness of breath

## 2021-03-14 NOTE — PHYSICAL THERAPY INITIAL EVALUATION ADULT - ADDITIONAL COMMENTS
Patient report he lives alone in 6th floor elevator access apartment.  No stairs to negotiate. Ambulated independently with straight cane and was independent with ADLs.  Has someone come in occasionally to clean apartment.

## 2021-03-14 NOTE — PROGRESS NOTE ADULT - ASSESSMENT
( Note written / Date of service 03-14-21 )    ==================>> MEDICATIONS <<====================    aspirin enteric coated 81 milliGRAM(s) Oral daily  atorvastatin 20 milliGRAM(s) Oral at bedtime  clopidogrel Tablet 75 milliGRAM(s) Oral daily  dexAMETHasone  Injectable 6 milliGRAM(s) IV Push daily  insulin lispro (ADMELOG) corrective regimen sliding scale   SubCutaneous three times a day before meals  losartan 100 milliGRAM(s) Oral daily  metoprolol succinate ER 50 milliGRAM(s) Oral daily    MEDICATIONS  (PRN):    ___________  Active diet:  Diet, Regular:   Consistent Carbohydrate Evening Snacks  ___________________    ==================>> VITAL SIGNS <<==================    Vital Signs Last 24 HrsT(C): 36.2 (03-14-21 @ 14:32)  T(F): 97.2 (03-14-21 @ 14:32), Max: 97.9 (03-14-21 @ 05:25)  HR: 59 (03-14-21 @ 14:32) (59 - 63)  BP: 151/62 (03-14-21 @ 14:32)  RR: 18 (03-14-21 @ 14:32) (18 - 19)  SpO2: 97% (03-14-21 @ 14:32) (97% - 100%)      CAPILLARY BLOOD GLUCOSE      POCT Blood Glucose.: 104 mg/dL (14 Mar 2021 17:01)  POCT Blood Glucose.: 289 mg/dL (14 Mar 2021 11:35)  POCT Blood Glucose.: 113 mg/dL (14 Mar 2021 08:35)  POCT Blood Glucose.: 131 mg/dL (13 Mar 2021 21:13)     ==================>> LAB AND IMAGING <<==================                        11.4   3.66  )-----------( 103      ( 14 Mar 2021 06:57 )             34.8        03-14    139  |  105  |  35<H>  ----------------------------<  78  4.1   |  26  |  1.00    Ca    8.6      14 Mar 2021 06:57      WBC count:   3.66 <<== ,  3.58 <<== ,  2.97 <<== ,  3.89 <<==   Hemoglobin:   11.4 <<==,  10.5 <<==,  11.1 <<==,  12.0 <<==  platelets:  103 <==, 100 <==, 109 <==, 130 <==    Creatinine:  1.00  <<==, 1.09  <<==, 0.85  <<==, 1.24  <<==  Sodium:   139  <==, 141  <==, 139  <==, 139  <==       AST:          22 <== , 29 <==      ALT:        28  <== , 32  <==      AP:        85  <=, 98  <=     Bili:        0.6  <=, 0.7  <=     _________________________________________________________________________________________  ========>>  M E D I C A L   A T T E N D I N G    F O L L O W  U P  N O T E  <<=========  -----------------------------------------------------------------------------------------------------    - Patient seen and examined by me earlier today.  (covering today)   - In summary,  MORTON GOLDBERG is a 89y year old man admitted with COVID  - Patient today overall doing ok, comfortable, eating OK. , not on O2     ==================>> REVIEW OF SYSTEM <<=================    GEN: no fever, no chills, no pain  RESP: no SOB, no cough, no sputum  CVS: no chest pain, no palpitations, no edema  GI: no abdominal pain, no nausea, no constipation, no diarrhea  : no dysuria, no frequency, no hematuria  Neuro: no headache, no dizziness  Derm : no itching, no rash    ==================>> PHYSICAL EXAM <<=================    GEN: A&O X 3 , NAD , comfortable  HEENT: NCAT, PERRL, MMM, hearing intact  Neck: supple , no JVD appreciated  CVS: S1S2 , regular , No M/R/G appreciated  PULM: CTA B/L,  no W/R/R appreciated  ABD.: soft. non tender, non distended,  bowel sounds present  Extrem: intact pulses , no edema   PSYCH : normal mood,  not anxious                             ( Note written / Date of service 03-14-21 )    ==================>> MEDICATIONS <<====================    aspirin enteric coated 81 milliGRAM(s) Oral daily  atorvastatin 20 milliGRAM(s) Oral at bedtime  clopidogrel Tablet 75 milliGRAM(s) Oral daily  dexAMETHasone  Injectable 6 milliGRAM(s) IV Push daily  insulin lispro (ADMELOG) corrective regimen sliding scale   SubCutaneous three times a day before meals  losartan 100 milliGRAM(s) Oral daily  metoprolol succinate ER 50 milliGRAM(s) Oral daily    ___________  Active diet:  Diet, Regular:   Consistent Carbohydrate Evening Snacks  ___________________    ==================>> VITAL SIGNS <<==================    Vital Signs Last 24 HrsT(C): 36.2 (03-14-21 @ 14:32)  T(F): 97.2 (03-14-21 @ 14:32), Max: 97.9 (03-14-21 @ 05:25)  HR: 59 (03-14-21 @ 14:32) (59 - 63)  BP: 151/62 (03-14-21 @ 14:32)  RR: 18 (03-14-21 @ 14:32) (18 - 19)  SpO2: 97% (03-14-21 @ 14:32) (97% - 100%)        POCT Blood Glucose.: 104 mg/dL (14 Mar 2021 17:01)  POCT Blood Glucose.: 289 mg/dL (14 Mar 2021 11:35)  POCT Blood Glucose.: 113 mg/dL (14 Mar 2021 08:35)  POCT Blood Glucose.: 131 mg/dL (13 Mar 2021 21:13)     ==================>> LAB AND IMAGING <<==================                        11.4   3.66  )-----------( 103      ( 14 Mar 2021 06:57 )             34.8        03-14    139  |  105  |  35<H>  ----------------------------<  78  4.1   |  26  |  1.00    Ca    8.6      14 Mar 2021 06:57      WBC count:   3.66 <<== ,  3.58 <<== ,  2.97 <<== ,  3.89 <<==   Hemoglobin:   11.4 <<==,  10.5 <<==,  11.1 <<==,  12.0 <<==  platelets:  103 <==, 100 <==, 109 <==, 130 <==    Creatinine:  1.00  <<==, 1.09  <<==, 0.85  <<==, 1.24  <<==  Sodium:   139  <==, 141  <==, 139  <==, 139  <==       AST:          22 <== , 29 <==      ALT:        28  <== , 32  <==      AP:        85  <=, 98  <=     Bili:        0.6  <=, 0.7  <=    ^^^ Inflammatory markers :  ^^^  C R P :          4 (03-11-21)  <<--  P C T :         0.05 (03-11-21) <<--    Ferritin :         101 (03-12-21) <<--    D-Dimer :          615 (03-11-21) <<--      ___________________________________________________________________________________  ===============>>  A S S E S S M E N T   A N D   P L A N <<===============  ------------------------------------------------------------------------------------------    Pt is a 89 y/o M with a significant PMHx of CAD with stents, DM and a significant PSHx of appendectomy presents to the ED after diagnosis of [facility]     Acute hypoxemic respiratory failure due to COVID-19 with Viral pneumonia from Viral syndrome.  overall resolved / better  finished treatment per protocol  Ambulate off O2 and document stability   supportive care  nutrition, hydration  MVI, Vitamin D, Zinc  deep breathing  OOB to chair / PT  DVT prophylaxis   Dispo planing    --------------------------------------------  Case discussed with pt, RN  Education given on findings and plan of care  ___________________________  HCandelaria Beebe D.O.  (covering today)   Pager: [phone]

## 2021-03-14 NOTE — DISCHARGE NOTE PROVIDER - NSDCMRMEDTOKEN_GEN_ALL_CORE_FT
Actos 15 mg oral tablet: 1 tab(s) orally once a day  Aspir 81 oral delayed release tablet: 1 tab(s) orally once a day  Januvia 100 mg oral tablet: 1 tab(s) orally once a day  Lipitor 20 mg oral tablet: 1 tab(s) orally once a day  Metoprolol Succinate ER 50 mg oral tablet, extended release: 1 tab(s) orally once a day  Micardis 80 mg oral tablet: 1 tab(s) orally once a day  Plavix 75 mg oral tablet: 1 tab(s) orally once a day   Actos 15 mg oral tablet: 1 tab(s) orally once a day  Aspir 81 oral delayed release tablet: 1 tab(s) orally once a day  dexamethasone 6 mg oral tablet: 1 tab(s) orally once a day  enoxaparin 40 mg/0.4 mL injectable solution: 40 milligram(s) injectable once a day  Januvia 100 mg oral tablet: 1 tab(s) orally once a day  Lipitor 20 mg oral tablet: 1 tab(s) orally once a day  Metoprolol Succinate ER 50 mg oral tablet, extended release: 1 tab(s) orally once a day  Micardis 80 mg oral tablet: 1 tab(s) orally once a day  Plavix 75 mg oral tablet: 1 tab(s) orally once a day

## 2021-03-15 ENCOUNTER — TRANSCRIPTION ENCOUNTER (OUTPATIENT)
Age: 86
End: 2021-03-15

## 2021-03-15 VITALS
TEMPERATURE: 98 F | RESPIRATION RATE: 18 BRPM | OXYGEN SATURATION: 98 % | DIASTOLIC BLOOD PRESSURE: 74 MMHG | HEART RATE: 74 BPM | SYSTOLIC BLOOD PRESSURE: 133 MMHG

## 2021-03-15 LAB
ANION GAP SERPL CALC-SCNC: 7 MMOL/L — SIGNIFICANT CHANGE UP (ref 5–17)
BUN SERPL-MCNC: 34 MG/DL — HIGH (ref 7–18)
CALCIUM SERPL-MCNC: 8.7 MG/DL — SIGNIFICANT CHANGE UP (ref 8.4–10.5)
CHLORIDE SERPL-SCNC: 105 MMOL/L — SIGNIFICANT CHANGE UP (ref 96–108)
CO2 SERPL-SCNC: 28 MMOL/L — SIGNIFICANT CHANGE UP (ref 22–31)
CREAT SERPL-MCNC: 1 MG/DL — SIGNIFICANT CHANGE UP (ref 0.5–1.3)
CULTURE RESULTS: SIGNIFICANT CHANGE UP
GLUCOSE BLDC GLUCOMTR-MCNC: 110 MG/DL — HIGH (ref 70–99)
GLUCOSE BLDC GLUCOMTR-MCNC: 122 MG/DL — HIGH (ref 70–99)
GLUCOSE BLDC GLUCOMTR-MCNC: 184 MG/DL — HIGH (ref 70–99)
GLUCOSE BLDC GLUCOMTR-MCNC: 216 MG/DL — HIGH (ref 70–99)
GLUCOSE SERPL-MCNC: 76 MG/DL — SIGNIFICANT CHANGE UP (ref 70–99)
HCT VFR BLD CALC: 34.9 % — LOW (ref 39–50)
HGB BLD-MCNC: 11.5 G/DL — LOW (ref 13–17)
MCHC RBC-ENTMCNC: 29.6 PG — SIGNIFICANT CHANGE UP (ref 27–34)
MCHC RBC-ENTMCNC: 33 GM/DL — SIGNIFICANT CHANGE UP (ref 32–36)
MCV RBC AUTO: 89.7 FL — SIGNIFICANT CHANGE UP (ref 80–100)
NRBC # BLD: 0 /100 WBCS — SIGNIFICANT CHANGE UP (ref 0–0)
PLATELET # BLD AUTO: 107 K/UL — LOW (ref 150–400)
POTASSIUM SERPL-MCNC: 3.9 MMOL/L — SIGNIFICANT CHANGE UP (ref 3.5–5.3)
POTASSIUM SERPL-SCNC: 3.9 MMOL/L — SIGNIFICANT CHANGE UP (ref 3.5–5.3)
RBC # BLD: 3.89 M/UL — LOW (ref 4.2–5.8)
RBC # FLD: 13.9 % — SIGNIFICANT CHANGE UP (ref 10.3–14.5)
SARS-COV-2 RNA SPEC QL NAA+PROBE: DETECTED
SODIUM SERPL-SCNC: 140 MMOL/L — SIGNIFICANT CHANGE UP (ref 135–145)
SPECIMEN SOURCE: SIGNIFICANT CHANGE UP
WBC # BLD: 2.91 K/UL — LOW (ref 3.8–10.5)
WBC # FLD AUTO: 2.91 K/UL — LOW (ref 3.8–10.5)

## 2021-03-15 PROCEDURE — 80053 COMPREHEN METABOLIC PANEL: CPT

## 2021-03-15 PROCEDURE — 85027 COMPLETE CBC AUTOMATED: CPT

## 2021-03-15 PROCEDURE — 80048 BASIC METABOLIC PNL TOTAL CA: CPT

## 2021-03-15 PROCEDURE — 82962 GLUCOSE BLOOD TEST: CPT

## 2021-03-15 PROCEDURE — 86900 BLOOD TYPING SEROLOGIC ABO: CPT

## 2021-03-15 PROCEDURE — 71045 X-RAY EXAM CHEST 1 VIEW: CPT

## 2021-03-15 PROCEDURE — 85379 FIBRIN DEGRADATION QUANT: CPT

## 2021-03-15 PROCEDURE — 87086 URINE CULTURE/COLONY COUNT: CPT

## 2021-03-15 PROCEDURE — 82728 ASSAY OF FERRITIN: CPT

## 2021-03-15 PROCEDURE — 99285 EMERGENCY DEPT VISIT HI MDM: CPT

## 2021-03-15 PROCEDURE — 82746 ASSAY OF FOLIC ACID SERUM: CPT

## 2021-03-15 PROCEDURE — 87635 SARS-COV-2 COVID-19 AMP PRB: CPT

## 2021-03-15 PROCEDURE — 36415 COLL VENOUS BLD VENIPUNCTURE: CPT

## 2021-03-15 PROCEDURE — 84443 ASSAY THYROID STIM HORMONE: CPT

## 2021-03-15 PROCEDURE — 93005 ELECTROCARDIOGRAM TRACING: CPT

## 2021-03-15 PROCEDURE — 83735 ASSAY OF MAGNESIUM: CPT

## 2021-03-15 PROCEDURE — 85610 PROTHROMBIN TIME: CPT

## 2021-03-15 PROCEDURE — 85730 THROMBOPLASTIN TIME PARTIAL: CPT

## 2021-03-15 PROCEDURE — 80061 LIPID PANEL: CPT

## 2021-03-15 PROCEDURE — U0005: CPT

## 2021-03-15 PROCEDURE — 84145 PROCALCITONIN (PCT): CPT

## 2021-03-15 PROCEDURE — 86850 RBC ANTIBODY SCREEN: CPT

## 2021-03-15 PROCEDURE — 86901 BLOOD TYPING SEROLOGIC RH(D): CPT

## 2021-03-15 PROCEDURE — 97116 GAIT TRAINING THERAPY: CPT

## 2021-03-15 PROCEDURE — 81001 URINALYSIS AUTO W/SCOPE: CPT

## 2021-03-15 PROCEDURE — 97162 PT EVAL MOD COMPLEX 30 MIN: CPT

## 2021-03-15 PROCEDURE — 83605 ASSAY OF LACTIC ACID: CPT

## 2021-03-15 PROCEDURE — 82607 VITAMIN B-12: CPT

## 2021-03-15 PROCEDURE — 83036 HEMOGLOBIN GLYCOSYLATED A1C: CPT

## 2021-03-15 PROCEDURE — 86140 C-REACTIVE PROTEIN: CPT

## 2021-03-15 PROCEDURE — 85025 COMPLETE CBC W/AUTO DIFF WBC: CPT

## 2021-03-15 PROCEDURE — 96374 THER/PROPH/DIAG INJ IV PUSH: CPT

## 2021-03-15 PROCEDURE — 84100 ASSAY OF PHOSPHORUS: CPT

## 2021-03-15 RX ORDER — INSULIN LISPRO 100/ML
VIAL (ML) SUBCUTANEOUS
Refills: 0 | Status: DISCONTINUED | OUTPATIENT
Start: 2021-03-15 | End: 2021-03-15

## 2021-03-15 RX ORDER — PANTOPRAZOLE SODIUM 20 MG/1
40 TABLET, DELAYED RELEASE ORAL
Refills: 0 | Status: DISCONTINUED | OUTPATIENT
Start: 2021-03-15 | End: 2021-03-15

## 2021-03-15 RX ADMIN — Medication 1: at 12:55

## 2021-03-15 RX ADMIN — CLOPIDOGREL BISULFATE 75 MILLIGRAM(S): 75 TABLET, FILM COATED ORAL at 13:23

## 2021-03-15 RX ADMIN — Medication 81 MILLIGRAM(S): at 13:23

## 2021-03-15 RX ADMIN — Medication 6 MILLIGRAM(S): at 05:26

## 2021-03-15 RX ADMIN — PANTOPRAZOLE SODIUM 40 MILLIGRAM(S): 20 TABLET, DELAYED RELEASE ORAL at 13:25

## 2021-03-15 RX ADMIN — LOSARTAN POTASSIUM 100 MILLIGRAM(S): 100 TABLET, FILM COATED ORAL at 05:25

## 2021-03-15 RX ADMIN — ENOXAPARIN SODIUM 40 MILLIGRAM(S): 100 INJECTION SUBCUTANEOUS at 13:23

## 2021-03-15 NOTE — DISCHARGE NOTE NURSING/CASE MANAGEMENT/SOCIAL WORK - PATIENT PORTAL LINK FT
You can access the FollowMyHealth Patient Portal offered by [facility] by registering at the following website: [email or web address]. By joining [employer]’s FollowMyHealth portal, you will also be able to view your health information using other applications (apps) compatible with our system.

## 2021-03-15 NOTE — PROGRESS NOTE ADULT - ASSESSMENT
seen and examined comfortable without O2  denies sob, palp, cp  apetite ok  alert awake not in any distress  vsstable  physical done unchanged  lungs clear heart is ok  no s/p tenderness    a/p covid pos  doing ok  pt lives alone ( 89 year old)  GREG )   had some blood yesterday  today declining  no s/p tenderness had U/A post on admission  OUTPATIENT UROLOGY  CAD STABLE ON PLAVIX ASA

## 2021-04-20 ENCOUNTER — TRANSCRIPTION ENCOUNTER (OUTPATIENT)
Age: 86
End: 2021-04-20

## 2021-05-06 ENCOUNTER — TRANSCRIPTION ENCOUNTER (OUTPATIENT)
Age: 86
End: 2021-05-06

## 2021-05-27 ENCOUNTER — TRANSCRIPTION ENCOUNTER (OUTPATIENT)
Age: 86
End: 2021-05-27

## 2021-06-12 ENCOUNTER — TRANSCRIPTION ENCOUNTER (OUTPATIENT)
Age: 86
End: 2021-06-12

## 2022-02-07 NOTE — PATIENT PROFILE ADULT - TOBACCO USE
INTERNAL MEDICINE OFFICE VISIT  [address] [address], [place]  NAME: Richar Zamora  AGE: 50 y o  SEX: female    DATE OF ENCOUNTER: 2/7/2022    Assessment and Plan     1  Acute follicular conjunctivitis of right eye3    Noting some bothersome on lateral right eye when moving eye ball to right past 4-5 month but was "okay" ; started to have watery right eye with some crusting and mucus discharge x 3-4 days ago, been prescribed Tobramycin by PA she talked to, and been taking it with some improvement of the inflammation/discharge however still feel the discomfort on Lateral right eye  On exam, Lateral right upper lid conjunctiva evident of swelling mass consistent with follicular conjunctivitis, with adjacent lipoma like small mass about 3 mm consistent with a conjunctival cyst  Agreed on try Tobradex x 1 week, and if not improved to consider further evaluation by Ophthalmology, if confirmed and still symptomatic may benefit from Cyst removal      - tobramycin-dexamethasone (TOBRADEX) ophthalmic suspension; Administer 1 drop to the right eye every 4 (four) hours while awake  Dispense: 5 mL; Refill: 0  - Ambulatory Referral to Ophthalmology; Future    2  Conjunctival cyst of right eye  - Ambulatory Referral to Ophthalmology; Future ; to schedule appointment if not improving after Tobradex trial as above         Orders Placed This Encounter   Procedures    Ambulatory Referral to Ophthalmology       - Counseling Documentation: patient was counseled regarding: diagnostic results and instructions for management    Chief Complaint     Chief Complaint   Patient presents with    Eye Problem     R eye swelling  pt  feels like something is in her eye   started Thursday           History of Present Illness     HPI    The following portions of the patient's history were reviewed and updated as appropriate: allergies, current medications, past family history, past medical history, past social history, past surgical history and problem list     Review of Systems     Review of Systems  - GENERAL: Negative for any nausea, vomiting, fevers, chills, or weight loss  - HEENT: right eye watery, crusting and discomfort; see above; Negative for any head/Neck trauma, pain, double/blurry vision, sinusitis, rhinitis, nose bleeding   - CARDIAC: Negative for any chest pain, palpitation, Dyspnea on exertion, peripheral edema  - PULMONARY: Negative for any SOB, cough, wheezing    - GASTROINTESTINAL: Negative for any abdominal pain, N/V/D/C, blood in stool    - GENITOURINARY: Negative for any dysuria, hematuria, incontinence   - NEUROLOGIC: Negative for any muscle weakness, numbness/tingling, memory changes  - MUSCULOSKELETAL: Negative for any joint pains/swelling, limited ROM  - INTEGUMENTARY: Negative for any rashes, cuts/ lesions   - HEMATOLOGIC: Negative for any abnormal bruising, frequent infections or bleeding      Active Problem List     Patient Active Problem List   Diagnosis    Chronic migraine without aura without status migrainosus, not intractable    Seasonal allergies    Macromastia    Pre-diabetes    Mixed dyslipidemia    GERD (gastroesophageal reflux disease)    Screening for breast cancer    Eustachian tube dysfunction, bilateral    Chronic cough    Weight gain    Thrombocytosis    Iron deficiency    Sore throat    Snoring    LASHAUN (generalized anxiety disorder)    Panic attack    BMI 40 0-44 9, adult (HCC)    History of endometrial ablation    Carpal tunnel syndrome of left wrist    Medial epicondylitis of left elbow       Objective     /80 (BP Location: Left arm, Patient Position: Sitting, Cuff Size: Large)   Pulse 102   Temp 98 3 °F (36 8 °C) (Temporal)   Ht 5' 8 5" (1 74 m)   Wt 122 kg (268 lb 1 6 oz)   SpO2 98%   BMI 40 17 kg/m²     Physical Exam  - GEN: Appears well, alert and oriented x 3, pleasant and cooperative, in no acute distress  - HEENT: Right eye upper lid , laterally, has swelling and cyst like lesion, see image and A&P for details  Anicteric, mucous membranes moist, PERRL and EOMI   - NECK: No lymphadenopathy, JVD or carotid bruits   - HEART: RRR, normal S1 and S2, no murmurs, clicks, gallops or rubs   - LUNGS: Clear to auscultation bilaterally; no wheezes, rales, or rhonchi  - ABDOMEN: Normal bowel sounds, soft, no tenderness, no distention, no organomegaly or masses felt on exam    - EXTREMITIES: Peripheral pulses normal; no clubbing, cyanosis, or edema  - NEURO: No focal findings, CN II-XII are grossly intact  - Musculoskeletal: 5/5 strength, normal ROM, no swollen or erythematous joints     - SKIN: Normal without suspicious lesions on exposed skin    Pertinent Laboratory/Diagnostic Studies:  CBC:   Lab Results   Component Value Date/Time    WBC 11 12 (H) 03/28/2021 11:06 AM    WBC 8 74 09/02/2015 11:47 AM    RBC 4 58 03/28/2021 11:06 AM    RBC 4 59 09/02/2015 11:47 AM    HGB 12 3 03/28/2021 11:06 AM    HGB 12 6 09/02/2015 11:47 AM    HCT 38 2 03/28/2021 11:06 AM    HCT 37 6 09/02/2015 11:47 AM    MCV 83 03/28/2021 11:06 AM    MCV 82 09/02/2015 11:47 AM    MCH 26 9 03/28/2021 11:06 AM    MCH 27 5 09/02/2015 11:47 AM    MCHC 32 2 03/28/2021 11:06 AM    MCHC 33 5 09/02/2015 11:47 AM    RDW 16 4 (H) 03/28/2021 11:06 AM    RDW 15 4 (H) 09/02/2015 11:47 AM    MPV 10 4 03/28/2021 11:06 AM    MPV 10 2 09/02/2015 11:47 AM     03/28/2021 11:06 AM     (H) 09/02/2015 11:47 AM    NRBC 0 03/28/2021 11:06 AM    NEUTOPHILPCT 56 03/28/2021 11:06 AM    NEUTOPHILPCT 53 09/02/2015 11:47 AM    LYMPHOPCT 36 03/28/2021 11:06 AM    LYMPHOPCT 38 09/02/2015 11:47 AM    MONOPCT 5 03/28/2021 11:06 AM    MONOPCT 7 09/02/2015 11:47 AM    EOSPCT 3 03/28/2021 11:06 AM    EOSPCT 2 09/02/2015 11:47 AM    BASOPCT 0 03/28/2021 11:06 AM    NEUTROABS 6 18 03/28/2021 11:06 AM    NEUTROABS 4 63 09/02/2015 11:47 AM    LYMPHSABS 3 96 03/28/2021 11:06 AM    LYMPHSABS 3 32 09/02/2015 11:47 AM MONOSABS 0 53 03/28/2021 11:06 AM    MONOSABS 0 61 09/02/2015 11:47 AM    EOSABS 0 37 03/28/2021 11:06 AM    EOSABS 0 17 09/02/2015 11:47 AM     Chemistry Profile:   Lab Results   Component Value Date/Time     09/02/2015 11:47 AM    K 3 9 03/28/2021 11:06 AM    K 4 0 09/02/2015 11:47 AM     03/28/2021 11:06 AM     09/02/2015 11:47 AM    CO2 27 03/28/2021 11:06 AM    CO2 25 09/02/2015 11:47 AM    ANIONGAP 8 09/02/2015 11:47 AM    BUN 16 03/28/2021 11:06 AM    BUN 17 09/02/2015 11:47 AM    CREATININE 0 73 03/28/2021 11:06 AM    CREATININE 0 68 09/02/2015 11:47 AM    GLUF 98 03/28/2021 11:06 AM    GLUF 88 08/04/2014 11:10 AM    GLUCOSE 88 09/02/2015 11:47 AM    CALCIUM 9 2 03/28/2021 11:06 AM    CALCIUM 8 7 09/02/2015 11:47 AM    CORRECTEDCA 9 7 03/28/2021 11:06 AM    AST 17 03/28/2021 11:06 AM    AST 16 09/02/2015 11:47 AM    ALT 29 03/28/2021 11:06 AM    ALT 22 09/02/2015 11:47 AM    ALKPHOS 83 03/28/2021 11:06 AM    ALKPHOS 69 09/02/2015 11:47 AM    PROT 7 7 09/02/2015 11:47 AM    BILITOT 0 94 09/02/2015 11:47 AM    EGFR 98 03/28/2021 11:06 AM       Current Medications     Current Outpatient Medications:     B Complex Vitamins (VITAMIN B COMPLEX PO), Take by mouth daily , Disp: , Rfl:     cyclobenzaprine (FLEXERIL) 5 mg tablet, Take 1 tablet (5 mg total) by mouth as needed for muscle spasms (migraine), Disp: 30 tablet, Rfl: 0    LORazepam (ATIVAN) 0 5 mg tablet, Take 2 tablets (1 mg total) by mouth daily as needed for anxiety, Disp: 20 tablet, Rfl: 0    multivitamin (THERAGRAN) TABS, Take 1 tablet by mouth daily, Disp: , Rfl:     onabotulinumtoxin A (BOTOX) 100 units, Inject into a muscle every 3 (three) months For migraines done every 3 months, Disp: , Rfl:     prochlorperazine (COMPAZINE) 10 mg tablet, Take 1 tablet (10 mg total) by mouth every 6 (six) hours as needed (migraine), Disp: 10 tablet, Rfl: 4    sertraline (ZOLOFT) 50 mg tablet, Take 1 tablet (50 mg total) by mouth daily, Disp: 90 tablet, Rfl: 2    tobramycin (TOBREX) 0 3 % SOLN, PLACE 1 DROP INTO THE AFFECTED EYE(S) EVERY 4 HOURS WHILE AWAKE UNTIL RESOLVED, Disp: , Rfl:     vitamin B-12 (VITAMIN B-12) 1,000 mcg tablet, Take by mouth daily, Disp: , Rfl:     Vitamin D, Cholecalciferol, 1000 units CAPS, Take 1 tablet by mouth in the morning  , Disp: , Rfl:     Rimegepant Sulfate (Nurtec) 75 MG TBDP, Take 75 mg by mouth as needed (migraine) Limit of 1 in 24 hours (Patient not taking: Reported on 2/7/2022 ), Disp: 8 tablet, Rfl: 11    tobramycin-dexamethasone (TOBRADEX) ophthalmic suspension, Administer 1 drop to the right eye every 4 (four) hours while awake, Disp: 5 mL, Rfl: 0    Health Maintenance     Health Maintenance   Topic Date Due    Hepatitis C Screening  Never done    HIV Screening  Never done    DTaP,Tdap,and Td Vaccines (1 - Tdap) Never done    Influenza Vaccine (1) 09/01/2021    Cervical Cancer Screening  01/14/2022    Annual Physical  02/24/2022    BMI: Followup Plan  03/26/2022    Breast Cancer Screening: Mammogram  10/22/2022    Depression Screening  02/07/2023    BMI: Adult  02/07/2023    COVID-19 Vaccine  Completed    Pneumococcal Vaccine: Pediatrics (0 to 5 Years) and At-Risk Patients (6 to 59 Years)  Aged Out    HIB Vaccine  Aged Out    Hepatitis B Vaccine  Aged Out    IPV Vaccine  Aged Out    Hepatitis A Vaccine  Aged Out    Meningococcal ACWY Vaccine  Aged Out    HPV Vaccine  Aged [employer] History   Administered Date(s) Administered    COVID-19 PFIZER VACCINE 0 3 ML IM 03/02/2021, 03/23/2021, 12/20/2021    H1N1, All Formulations 11/22/2009    INFLUENZA 11/04/2018, 11/08/2020     [facility]   Internal Medicine - [address]    2/7/2022 1:28 PM Never smoker

## 2022-08-11 ENCOUNTER — NON-APPOINTMENT (OUTPATIENT)
Age: 87
End: 2022-08-11

## 2022-11-16 NOTE — PHYSICAL THERAPY INITIAL EVALUATION ADULT - PATIENT/FAMILY/SIGNIFICANT OTHER GOALS STATEMENT, PT EVAL
"I think I need rehab so I can get stronger" Advancement-Rotation Flap Text: The defect edges were debeveled with a #15 scalpel blade.  Given the location of the defect, shape of the defect and the proximity to free margins an advancement-rotation flap was deemed most appropriate.  Using a sterile surgical marker, an appropriate flap was drawn incorporating the defect and placing the expected incisions within the relaxed skin tension lines where possible. The area thus outlined was incised deep to adipose tissue with a #15 scalpel blade.  The skin margins were undermined to an appropriate distance in all directions utilizing iris scissors.

## 2023-03-19 ENCOUNTER — EMERGENCY (EMERGENCY)
Facility: HOSPITAL | Age: 88
LOS: 1 days | Discharge: HOME | End: 2023-03-19
Attending: STUDENT IN AN ORGANIZED HEALTH CARE EDUCATION/TRAINING PROGRAM
Payer: MEDICARE

## 2023-03-19 VITALS
SYSTOLIC BLOOD PRESSURE: 121 MMHG | RESPIRATION RATE: 18 BRPM | DIASTOLIC BLOOD PRESSURE: 58 MMHG | OXYGEN SATURATION: 96 % | TEMPERATURE: 98 F | HEART RATE: 60 BPM

## 2023-03-19 VITALS
HEART RATE: 75 BPM | SYSTOLIC BLOOD PRESSURE: 126 MMHG | DIASTOLIC BLOOD PRESSURE: 63 MMHG | RESPIRATION RATE: 18 BRPM | WEIGHT: 128.09 LBS | TEMPERATURE: 99 F | OXYGEN SATURATION: 96 %

## 2023-03-19 DIAGNOSIS — Z90.49 ACQUIRED ABSENCE OF OTHER SPECIFIED PARTS OF DIGESTIVE TRACT: Chronic | ICD-10-CM

## 2023-03-19 PROCEDURE — 99284 EMERGENCY DEPT VISIT MOD MDM: CPT

## 2023-03-19 PROCEDURE — 93005 ELECTROCARDIOGRAM TRACING: CPT

## 2023-03-19 PROCEDURE — 73030 X-RAY EXAM OF SHOULDER: CPT

## 2023-03-19 PROCEDURE — 73030 X-RAY EXAM OF SHOULDER: CPT | Mod: 26,LT

## 2023-03-19 PROCEDURE — 93010 ELECTROCARDIOGRAM REPORT: CPT

## 2023-03-19 PROCEDURE — 99283 EMERGENCY DEPT VISIT LOW MDM: CPT | Mod: 25

## 2023-03-19 RX ORDER — ACETAMINOPHEN 500 MG
650 TABLET ORAL ONCE
Refills: 0 | Status: COMPLETED | OUTPATIENT
Start: 2023-03-19 | End: 2023-03-19

## 2023-03-19 RX ADMIN — Medication 650 MILLIGRAM(S): at 02:30

## 2023-03-19 NOTE — ED PROVIDER NOTE - PATIENT PORTAL LINK FT
You can access the FollowMyHealth Patient Portal offered by [facility] by registering at the following website: [email or web address]. By joining [employer]’s FollowMyHealth portal, you will also be able to view your health information using other applications (apps) compatible with our system.

## 2023-03-19 NOTE — ED PROVIDER NOTE - NSFOLLOWUPINSTRUCTIONS_ED_ALL_ED_FT
Rest and stay well hydrated.     Take over the counter acetaminophen (Tylenol) 650 mg every 4-6 hours as needed for pain. Do not take more than 3000 mg in a 24 hour period. Be aware many over the counter and prescription medications also contain acetaminophen (Tylenol).     Follow up with orthopedics as discussed (info provided).     SEEK IMMEDIATE MEDICAL CARE IF YOU HAVE ANY OF THE FOLLOWING SYMPTOMS: severe pain, fevers, inability to move that body part, numbness or tingling.

## 2023-03-19 NOTE — ED PROVIDER NOTE - OBJECTIVE STATEMENT
89-year-old male with past medical history CAD on aspirin and Plavix, diabetes, hypertension presents with left shoulder pain x3 to 4 days.  Patient reports left shoulder pain radiating to left upper arm, worse with movements over the past 4 days.  Denies any preceding injury or trauma.  Denies any fevers, numbness, weakness, or rash.  Denies any chest pain, shortness of breath, vomiting, dizziness, lightheadedness, sweating, or abdominal pain.  Denies take any over-the-counter medications for symptoms.  Denies any additional complaints.

## 2023-03-19 NOTE — ED PROVIDER NOTE - PROGRESS NOTE DETAILS
Chloe: pt seen and re-evaluated at bedside.  Pt states his symptoms have improved.  Pt comfortable in NAD.  Will DC with ortho follow up/return precautions, pt understood and agreeable with plan.

## 2023-03-19 NOTE — ED PROVIDER NOTE - CLINICAL SUMMARY MEDICAL DECISION MAKING FREE TEXT BOX
Chloe: 89-year-old male with past medical history CAD on aspirin and Plavix, diabetes, hypertension presents with left shoulder pain x3 to 4 days.  Patient reports left shoulder pain radiating to left upper arm, worse with movements over the past 4 days.  Denies any preceding injury or trauma.  Denies any fevers, numbness, weakness, or rash.  Denies any chest pain, shortness of breath, vomiting, dizziness, lightheadedness, sweating, or abdominal pain.  Denies take any over-the-counter medications for symptoms.  Shoulder pain MSK in nature, reproducible with movement, not consistent with ACS. Extremity NVI. Will obtain XR, provide supportive treatment with dispo pending workup.

## 2023-03-19 NOTE — ED PROVIDER NOTE - PHYSICAL EXAMINATION
CONSTITUTIONAL: non-toxic, well appearing  SKIN: no rash, no petechiae.  EYES: pink conjunctiva, anicteric  NECK: Supple; no meningismus, no JVD  CARD: RRR, no murmurs, equal radial pulses bilaterally 2+  RESP: CTAB, no respiratory distress  ABD: Soft, non-tender, non-distended, no peritoneal signs  EXT: Normal ROM x4. Left shoulder: FROM, pain reproduced with movement, no skin changes, extremity NVI.   NEURO: Alert, oriented. Neuro exam nonfocal, equal strength bilaterally.  PSYCH: Cooperative, appropriate.

## 2023-03-19 NOTE — ED ADULT NURSE NOTE - OBJECTIVE STATEMENT
As per pt. c/o left arm pain x 1 day that is not getting better. Denies falling and all other symptoms

## 2023-03-19 NOTE — ED PROVIDER NOTE - NSFOLLOWUPCLINICS_GEN_ALL_ED_FT
[facility]  Orthopedics  [address] [place] [address]  Phone: [phone]  Fax: [phone]

## 2023-08-31 ENCOUNTER — NON-APPOINTMENT (OUTPATIENT)
Age: 88
End: 2023-08-31

## 2023-09-18 ENCOUNTER — INPATIENT (INPATIENT)
Facility: HOSPITAL | Age: 88
LOS: 4 days | Discharge: NURSING, REHAB OR LONG-TERM CARE | DRG: 177 | End: 2023-09-23
Attending: STUDENT IN AN ORGANIZED HEALTH CARE EDUCATION/TRAINING PROGRAM | Admitting: STUDENT IN AN ORGANIZED HEALTH CARE EDUCATION/TRAINING PROGRAM
Payer: MEDICARE

## 2023-09-18 VITALS
HEIGHT: 60 IN | RESPIRATION RATE: 20 BRPM | DIASTOLIC BLOOD PRESSURE: 67 MMHG | SYSTOLIC BLOOD PRESSURE: 108 MMHG | OXYGEN SATURATION: 90 % | HEART RATE: 86 BPM | WEIGHT: 110.23 LBS | TEMPERATURE: 100 F

## 2023-09-18 DIAGNOSIS — U07.1 COVID-19: ICD-10-CM

## 2023-09-18 DIAGNOSIS — Z90.49 ACQUIRED ABSENCE OF OTHER SPECIFIED PARTS OF DIGESTIVE TRACT: Chronic | ICD-10-CM

## 2023-09-18 LAB
ALBUMIN SERPL ELPH-MCNC: 3 G/DL — LOW (ref 3.5–5)
ALP SERPL-CCNC: 67 U/L — SIGNIFICANT CHANGE UP (ref 40–120)
ALT FLD-CCNC: 27 U/L DA — SIGNIFICANT CHANGE UP (ref 10–60)
ANION GAP SERPL CALC-SCNC: 6 MMOL/L — SIGNIFICANT CHANGE UP (ref 5–17)
AST SERPL-CCNC: 26 U/L — SIGNIFICANT CHANGE UP (ref 10–40)
BASOPHILS # BLD AUTO: 0.02 K/UL — SIGNIFICANT CHANGE UP (ref 0–0.2)
BASOPHILS NFR BLD AUTO: 0.3 % — SIGNIFICANT CHANGE UP (ref 0–2)
BILIRUB SERPL-MCNC: 0.7 MG/DL — SIGNIFICANT CHANGE UP (ref 0.2–1.2)
BUN SERPL-MCNC: 35 MG/DL — HIGH (ref 7–18)
CALCIUM SERPL-MCNC: 8.4 MG/DL — SIGNIFICANT CHANGE UP (ref 8.4–10.5)
CHLORIDE SERPL-SCNC: 104 MMOL/L — SIGNIFICANT CHANGE UP (ref 96–108)
CO2 SERPL-SCNC: 29 MMOL/L — SIGNIFICANT CHANGE UP (ref 22–31)
CREAT SERPL-MCNC: 1.38 MG/DL — HIGH (ref 0.5–1.3)
EGFR: 47 ML/MIN/1.73M2 — LOW
EOSINOPHIL # BLD AUTO: 0 K/UL — SIGNIFICANT CHANGE UP (ref 0–0.5)
EOSINOPHIL NFR BLD AUTO: 0 % — SIGNIFICANT CHANGE UP (ref 0–6)
FLUAV AG NPH QL: SIGNIFICANT CHANGE UP
FLUBV AG NPH QL: SIGNIFICANT CHANGE UP
GLUCOSE SERPL-MCNC: 97 MG/DL — SIGNIFICANT CHANGE UP (ref 70–99)
HCT VFR BLD CALC: 32.8 % — LOW (ref 39–50)
HGB BLD-MCNC: 10.7 G/DL — LOW (ref 13–17)
IMM GRANULOCYTES NFR BLD AUTO: 0.7 % — SIGNIFICANT CHANGE UP (ref 0–0.9)
LACTATE SERPL-SCNC: 0.9 MMOL/L — SIGNIFICANT CHANGE UP (ref 0.7–2)
LIDOCAIN IGE QN: 108 U/L — HIGH (ref 13–75)
LYMPHOCYTES # BLD AUTO: 0.49 K/UL — LOW (ref 1–3.3)
LYMPHOCYTES # BLD AUTO: 8.4 % — LOW (ref 13–44)
MAGNESIUM SERPL-MCNC: 2.2 MG/DL — SIGNIFICANT CHANGE UP (ref 1.6–2.6)
MCHC RBC-ENTMCNC: 29.1 PG — SIGNIFICANT CHANGE UP (ref 27–34)
MCHC RBC-ENTMCNC: 32.6 GM/DL — SIGNIFICANT CHANGE UP (ref 32–36)
MCV RBC AUTO: 89.1 FL — SIGNIFICANT CHANGE UP (ref 80–100)
MONOCYTES # BLD AUTO: 0.56 K/UL — SIGNIFICANT CHANGE UP (ref 0–0.9)
MONOCYTES NFR BLD AUTO: 9.6 % — SIGNIFICANT CHANGE UP (ref 2–14)
NEUTROPHILS # BLD AUTO: 4.73 K/UL — SIGNIFICANT CHANGE UP (ref 1.8–7.4)
NEUTROPHILS NFR BLD AUTO: 81 % — HIGH (ref 43–77)
NRBC # BLD: 0 /100 WBCS — SIGNIFICANT CHANGE UP (ref 0–0)
NT-PROBNP SERPL-SCNC: 1887 PG/ML — HIGH (ref 0–450)
PLATELET # BLD AUTO: 154 K/UL — SIGNIFICANT CHANGE UP (ref 150–400)
POTASSIUM SERPL-MCNC: 4.4 MMOL/L — SIGNIFICANT CHANGE UP (ref 3.5–5.3)
POTASSIUM SERPL-SCNC: 4.4 MMOL/L — SIGNIFICANT CHANGE UP (ref 3.5–5.3)
PROT SERPL-MCNC: 7 G/DL — SIGNIFICANT CHANGE UP (ref 6–8.3)
RBC # BLD: 3.68 M/UL — LOW (ref 4.2–5.8)
RBC # FLD: 15.2 % — HIGH (ref 10.3–14.5)
SARS-COV-2 RNA SPEC QL NAA+PROBE: DETECTED
SODIUM SERPL-SCNC: 139 MMOL/L — SIGNIFICANT CHANGE UP (ref 135–145)
TROPONIN I, HIGH SENSITIVITY RESULT: 112.3 NG/L — HIGH
WBC # BLD: 5.84 K/UL — SIGNIFICANT CHANGE UP (ref 3.8–10.5)
WBC # FLD AUTO: 5.84 K/UL — SIGNIFICANT CHANGE UP (ref 3.8–10.5)

## 2023-09-18 PROCEDURE — 93010 ELECTROCARDIOGRAM REPORT: CPT

## 2023-09-18 PROCEDURE — 71045 X-RAY EXAM CHEST 1 VIEW: CPT | Mod: 26

## 2023-09-18 PROCEDURE — 99223 1ST HOSP IP/OBS HIGH 75: CPT

## 2023-09-18 PROCEDURE — 99285 EMERGENCY DEPT VISIT HI MDM: CPT

## 2023-09-18 RX ORDER — ASPIRIN/CALCIUM CARB/MAGNESIUM 324 MG
324 TABLET ORAL ONCE
Refills: 0 | Status: COMPLETED | OUTPATIENT
Start: 2023-09-18 | End: 2023-09-18

## 2023-09-18 RX ORDER — DEXAMETHASONE 0.5 MG/5ML
6 ELIXIR ORAL ONCE
Refills: 0 | Status: COMPLETED | OUTPATIENT
Start: 2023-09-18 | End: 2023-09-18

## 2023-09-18 RX ORDER — ACETAMINOPHEN 500 MG
650 TABLET ORAL ONCE
Refills: 0 | Status: COMPLETED | OUTPATIENT
Start: 2023-09-18 | End: 2023-09-18

## 2023-09-18 NOTE — ED PROVIDER NOTE - NS ED ROS FT
Constitutional: (-) fever (-) chills  HENT: (-) congestion (-) rhinorrhea (-) sore throat  Eyes: (-) pain (-) redness  Respiratory: (+) cough (+) shortness of breath (-) wheezing (-) stridor    Cardiovascular: (-) chest pain (-) palpitations (-) leg swelling  Gastrointestinal: (-) abdominal pain (-) blood in stool (no melena/hematochezia) (-) diarrhea (-) vomiting  Genitourinary: (-) dysuria (-) hematuria  Musculoskeletal: (-) joint swelling (-) myalgias  Skin: (-) color change (-) rash  Neurological: (-) weakness (-) numbness (-) headaches  Psychiatric/Behavioral: (-) confusion

## 2023-09-18 NOTE — H&P ADULT - ASSESSMENT
Patient is a 89 y/o M who lives alone and with a significant PMHx of CAD with stents, DM and a significant PSHx of appendectomy who presented with 3 day history of severe cough and weakness. Patient was found to have COVID-19. Patient is being admitted for [facility] 2[facility]2 to COVID.

## 2023-09-18 NOTE — H&P ADULT - ATTENDING COMMENTS
Vital Signs Last 24 Hrs  T(C): 37.9 (18 Sep 2023 19:48), Max: 37.9 (18 Sep 2023 19:48)  T(F): 100.2 (18 Sep 2023 19:48), Max: 100.2 (18 Sep 2023 19:48)  HR: 71 (18 Sep 2023 19:48) (71 - 86)  BP: 101/54 (18 Sep 2023 19:48) (101/54 - 108/67)  RR: 20 (18 Sep 2023 19:48) (20 - 20)  SpO2: 95% (18 Sep 2023 19:48) (90% - 95%)  Parameters below as of 18 Sep 2023 19:48  Patient On (Oxygen Delivery Method): nasal cannula  O2 Flow (L/min): 3 Vital Signs Last 24 Hrs  T(C): 37.9 (18 Sep 2023 19:48), Max: 37.9 (18 Sep 2023 19:48)  T(F): 100.2 (18 Sep 2023 19:48), Max: 100.2 (18 Sep 2023 19:48)  HR: 71 (18 Sep 2023 19:48) (71 - 86)  BP: 101/54 (18 Sep 2023 19:48) (101/54 - 108/67)  RR: 20 (18 Sep 2023 19:48) (20 - 20)  SpO2: 95% (18 Sep 2023 19:48) (90% - 95%)  Parameters below as of 18 Sep 2023 19:48  Patient On (Oxygen Delivery Method): nasal cannula  O2 Flow (L/min): 3    Labs reviewed  wbc - 5.8 with granulocytosis   Hgb 10.7  BUN/Cr 35/1.38  trop - 112  Pro BNP - 1887    Covid 19 +ve  CXR - ? RLL opacity Vital Signs Last 24 Hrs  T(C): 37.9 (18 Sep 2023 19:48), Max: 37.9 (18 Sep 2023 19:48)  T(F): 100.2 (18 Sep 2023 19:48), Max: 100.2 (18 Sep 2023 19:48)  HR: 71 (18 Sep 2023 19:48) (71 - 86)  BP: 101/54 (18 Sep 2023 19:48) (101/54 - 108/67)  RR: 20 (18 Sep 2023 19:48) (20 - 20)  SpO2: 95% (18 Sep 2023 19:48) (90% - 95%)  Parameters below as of 18 Sep 2023 19:48  Patient On (Oxygen Delivery Method): nasal cannula  O2 Flow (L/min): 3    Labs reviewed  wbc - 5.8 with granulocytosis   Hgb 10.7  BUN/Cr 35/1.38  trop - 112  Pro BNP - 1887    Covid 19 +ve  CXR - ? RLL opacity    Impression   - Acute respiratory failure   - Acute covid 19 infection with concern for pneumonia  CXR findings however appear unchanged from baseline   Possibly due to dehydration   -THOMAS -pre-renal     Plan   Admit to Medicine with isolation  Sepsis work up   Empiric IV antibiotics with cefriaxone and zithromax  Continue decadron 6mg daily for now  IVF hydration   Supplemental O2 and wean off as possible   Med reconciliation

## 2023-09-18 NOTE — ED PROVIDER NOTE - PHYSICAL EXAMINATION
Gen:  Awake, alert, NAD, elderly/frail, NCAT, non-toxic appearing.   Eyes:  PERRL, EOMI, no icterus, normal lids/lashes, normal conjunctivae.  ENT:  External inspection normal, pink/moist membranes.   CV:  S1S2, regular rate and rhythm, no murmur/gallops/rubs, no JVD, 2+ pulses b/l, no edema/cords/homans, warm/well-perfused.  Resp:  Normal respiratory rate/effort, no respiratory distress, normal voice, speaking full sentences, lungs w rales diffusely, no retractions, no stridor.  Abd:  Soft abdomen, no tender/distended/guarding/rebound, no pulsatile mass, no CVA tender.   Musculoskeletal:  N/V intact, FROM all 4 extremities, normal motor tone  Neck:  FROM neck, supple, trachea midline, no meningismus.   Skin:  Color normal for race, warm and dry, no rash.  Neuro:  Oriented x3, CN 2-12 intact (grossly), normal motor (grossly), normal sensory (grossly), GCS 15  Psych:  Attention normal. Affect normal. Behavior normal. Judgment normal.

## 2023-09-18 NOTE — H&P ADULT - PROBLEM SELECTOR PLAN 2
P/w Cr of 1.38 (baseline 1.0)  Likely prerenal in setting of decreased oral intake.   Avoid nephrotoxic agents.   F/U BMP

## 2023-09-18 NOTE — ED ADULT NURSE NOTE - NSSEPSISSUSPECTED_ED_A_ED
Place of Service: [id number]      2/10/2017      PATIENT NAME:   Brenda Holland  :1980   MRN:[id number]     Date of Service: 2/10/2017      PERFORMING PROVIDER:   Marco Antonio Garcia MD    Preoperative Diagnosis: Anemia        OPERATIVE PROCEDURE:       ANESTHESIA:   MAC    Anesthesia Medications administered: MAC as per Anesthesia  Lidocaine 4% 15 mL gargle was notadministered.        Procedure Description:?The patient was placed in the left lateral position and monitored continuously with automatic blood pressure, ECG tracing, pulse oximetry monitoring and direct observations. Bite block placed and oxygen administered by a nasal cannula as needed. Medications were administered incrementally over the course of the procedure to achieve an adequate level of conscious sedation. After adequate sedation, the endoscope was carefully introduced into the oropharynx and passed in to the esophagus.? There was normal pharyngeal and laryngeal structure.  The esophagus and GE junction were carefully examined. After advancement of the endoscope into the stomach, a careful examination was performed, including views of the antrum, incisura angularis, corpus and retroflexed views of the cardia and fundus.?   The pylorus was then intubated without any difficulty and the endoscope was advanced to the second portion of the duodenum. Careful examination of the second portion of the duodenum and the bulb was then performed. Findings and intervention are detailed below. The stomach was then decompressed and the endoscope withdrawn.  Overall, the patient tolerated the procedure well without undue discomfort, hypotension or desaturation. The patient recovered in the observation area and was discharged when adequately recovered.?      Findings:     Esophagus:   The Z-line was measured at 36 cm, GEJ at 36 cm and hiatus at 40 cm from the incisors.?  hiatal hernia    Stomach:  Biopsies  obtained to rule out helicobacter pylori and Gastric erythema    Duodenum/small bowel:   Normal and Biopsies obtained to rule out Celiac's disease             Colonoscopy: The patient was turned in the room &  placed in the left lateral position. A rectal exam performed The patient was and monitored continuously through ECG tracing, pulse oximetry monitoring and direct observations.A Olympus ACF-190AL was inserted into the rectum and advanced under direct vision to the cecum, which was identified by IC valve and appendiceal orifice. The procedure was considered More difficult than average. Additional maneuvers used to reach the cecum, manual pressure..      During withdrawal examination, the final quality of the prep was [place] bowel prep scale.3- (entire mucosa of colon segment seen well, with no residual staining, small fragments of stool, or opaque liquid)    A careful inspection was made as the colonoscope was withdrawn, including a retroflexed view of the rectum, findings and interventions are described below. Appropriate photo documentation was obtained.    Cecal withdrawal time : 10mins    Overall Brenda Holland tolerated the procedure well, without undue discomfort, hypotension or desaturation. The patient was adequately recovered in the endoscopy suite and was transported to PACU.      Findings:  Anorectal: Normal  Terminal ileum: Normal mucosa with preserve vascularity, absence of ulcers,erythema, or friability  Cecum: Normal mucosa with preserve vascularity, absence of ulcers,erythema, or friability  Ascending colon: Normal mucosa with preserve vascularity, absence of ulcers,erythema, or friability  Transverse colon: Diverticulosis  Descending colon: Normal mucosa with preserve vascularity, absence of ulcers,erythema, or friability  Sigmoid colon: Normal mucosa with preserve vascularity, absence of ulcers,erythema, or friability  Rectum: Normal mucosa with preserve vascularity, absence of  ulcers,erythema, or friability    Complications: none    Impression:  Gastritis   Diverticulosis       Recommendations  · Awaiting pathology results due to biopsy(s) performed., Follow up with me in  3  months. and Proceed with Video Capsule Endoscopy for further evaluation of  Anemia.               No

## 2023-09-18 NOTE — ED ADULT NURSE NOTE - IS THE PATIENT ABLE TO BE SCREENED?
Nita Bliss is a 77 year old female patient.  Chief Complaint:     Patient Active Problem List   Diagnosis   • Pure hypercholesterolemia   • Essential hypertension   • Osteopenia   • Primary osteoarthritis involving multiple joints   • Vitamin D deficiency   • Obesity, Class II, BMI 35-39.9   • Chronic nonallergic rhinitis   • Chronic cough   • Deviated nasal septum   • Latex allergy, contact dermatitis   • Gastroesophageal reflux disease   • Glaucoma suspect   • Pseudophakia of both eyes   • Decreased diffusion capacity of lung   • Chronic heart failure with preserved ejection fraction (HFpEF) (CMS/[facility])   • Nonrheumatic mitral valve regurgitation   • Nonrheumatic tricuspid valve regurgitation   • Mild persistent asthma without complication   • Bilateral dry eyes   • Stage 3b chronic kidney disease (CMS/[facility])   • Obstructive sleep apnea on CPAP   • Migraine headaches   • Pulmonary hypertension due to left ventricular diastolic dysfunction (CMS/[facility])   • Recurrent major depressive disorder (CMS/[facility])   • Generalized anxiety disorder   • Chronic insomnia   • Chronic neck pain   • Chronic low back pain   • Spondylosis, cervical, with myelopathy   • Lumbar stenosis with neurogenic claudication   • Cervical myelopathy (CMS/[facility])     Past Medical History:   Diagnosis Date   • Acid reflux    • Anxiety    • Asthma 07/02/2015   • Basal cell carcinoma     nose and lower abdomen   • Bronchitis    • Chronic heart failure with preserved ejection fraction (HFpEF) (CMS/[facility])    • Chronic insomnia    • Chronic low back pain    • Chronic neck pain    • Chronic sinusitis    • CKD (chronic kidney disease) stage 3, GFR 30-59 ml/min (CMS/[facility]) 04/01/2016   • Decreased diffusion capacity of lung 05/21/2015   • Depression    • Deviated septum    • Dry eye syndrome    • Esophageal hernia    • Fracture     hx of fractured ankle and foot   • Glaucoma    • Hyperlipidemia    • Hypertension    • Left carpal tunnel syndrome    • Low back pain     • Migraine headaches    • Mild to moderate mitral regurgitation 06/04/2015   • Mild tricuspid regurgitation 06/04/2015   • Obesity    • Obstructive sleep apnea 06/13/2016    Mild per sleep study 6/13/2016. Uses BiPAP.   • Osteoarthritis    • Osteopenia    • Pseudophakia of both eyes     w/ PC IOL, both eyes   • Pulmonary hypertension due to left ventricular diastolic dysfunction (CMS/HCC)    • PVD (posterior vitreous detachment), both eyes    • S/P YAG capsulotomy     OD x 2  (2007 & 2010)   • Vitamin D deficiency      Current Facility-Administered Medications   Medication Dose Route Frequency Provider Last Rate Last Admin   • sodium chloride (NORMAL SALINE) 0.9 % bolus 500 mL  500 mL Intravenous PRN Taurus Mohan MD       • [Held by provider] propRANolol (INDERAL) tablet 60 mg  60 mg Oral 2 times per day Jaquan Hubbard, DO   60 mg at 05/29/22 0821   • albuterol inhaler 2 puff  2 puff Inhalation Q4H PRN Mikaela Mcwilliams PA-C       • fluticasone (FLONASE) 50 MCG/ACT nasal spray 2 spray  2 spray Each Nare Daily Mikaela Mcwilliams PA-C   2 spray at 05/29/22 0822   • fluticasone-vilanterol (BREO ELLIPTA) 100-25 MCG/INH inhaler 1 puff  1 puff Inhalation Daily Resp Mikaela Mcwilliams PA-C   1 puff at 05/29/22 0714   • [Held by provider] furosemide (LASIX) tablet 20 mg  20 mg Oral Daily Jaquan Hubbard DO   20 mg at 05/29/22 0821   • [Held by provider] lisinopril (ZESTRIL) tablet 2.5 mg  2.5 mg Oral Daily Mikaela Mcwilliams PA-C   2.5 mg at 05/27/22 2124   • LORazepam (ATIVAN) tablet 1 mg  1 mg Oral Nightly PRN Mikaela Mcwilliams PA-C       • pantoprazole (PROTONIX) EC tablet 40 mg  40 mg Oral Daily Mikaela Mcwilliams PA-C   40 mg at 05/29/22 0821   • rosuvastatin (CRESTOR) tablet 20 mg  20 mg Oral Daily Mikaela Mcwilliams PA-C   20 mg at 05/29/22 0822   • sertraline (ZOLOFT) tablet 100 mg  100 mg Oral Daily Mikaela Mcwilliams PA-C   100 mg at 05/29/22 0821   • mupirocin (BACTROBAN) 2 % ointment 1 application  1  application Each Nare 2 times per day Mikaela Mcwilliams PA-C   1 application at 05/29/22 2029   • diphenhydrAMINE (BENADRYL) 12.5 MG/5ML liquid 12.5 mg  12.5 mg Oral Q6H PRN Mikaela Mcwilliams PA-C       • calcium carbonate (TUMS) chewable tablet 500-1,000 mg  500-1,000 mg Oral Q4H PRN Mikaela Mcwilliams PA-C       • ondansetron (ZOFRAN ODT) disintegrating tablet 4 mg  4 mg Oral Q12H PRN Mikaela Mcwilliams PA-C        Or   • ondansetron (ZOFRAN) injection 4 mg  4 mg Intravenous Q12H PRN Mikaela Mcwilliams PA-C       • metoCLOPramide (REGLAN) tablet 5 mg  5 mg Oral Q6H PRN Mikaela Mcwilliams PA-C        Or   • metoCLOPramide (REGLAN) injection 5 mg  5 mg Intravenous Q6H PRN Mikaela Mcwilliams PA-C       • hydrALAZINE (APRESOLINE) injection 10 mg  10 mg Intravenous Q4H PRN Mikaela Mcwilliams PA-C   10 mg at 05/27/22 2239   • HYDROcodone-acetaminophen (NORCO) 5-325 MG per tablet 1-2 tablet  1-2 tablet Oral Q4H PRN Mikaela Mcwilliams PA-C   1 tablet at 05/30/22 0516   • HYDROmorphone (DILAUDID) injection 0.2 mg  0.2 mg Intravenous Q2H PRN Mikaela Mcwilliams PA-C       • tiZANidine (ZANAFLEX) tablet 4 mg  4 mg Oral TID PRN Mikaela Mcwilliams PA-C   4 mg at 05/30/22 0516   • polyethylene glycol (MIRALAX) packet 17 g  17 g Oral Daily PRN Mikaela Mcwilliams PA-C   17 g at 05/29/22 0830   • docusate sodium-sennosides (SENOKOT S) 50-8.6 MG 2 tablet  2 tablet Oral BID PRN Mikaela Mcwilliams PA-C   2 tablet at 05/29/22 2029   • bisacodyl (DULCOLAX) suppository 10 mg  10 mg Rectal Daily PRN Mikaela Mcwilliams PA-C       • magnesium hydroxide (MILK OF MAGNESIA) 400 MG/5ML suspension 30 mL  30 mL Oral Daily PRN Mikaela Mcwilliams PA-C       • acetaminophen (TYLENOL) tablet 650 mg  650 mg Oral 4x Daily Eun Escobar MD   650 mg at 05/29/22 2029     ALLERGIES:   Allergen Reactions   • Latex   (Environmental) RASH     Contact dermatitis     Principal Problem:    Cervical myelopathy (CMS/HCC)  Active Problems:    Mild persistent  asthma without complication    Chronic low back pain    Gastroesophageal reflux disease    Generalized anxiety disorder    Pure hypercholesterolemia    Essential hypertension    Obesity, Class II, BMI 35-39.9    Chronic heart failure with preserved ejection fraction (HFpEF) (CMS/[facility])    Stage 3b chronic kidney disease (CMS/[facility])    Obstructive sleep apnea on CPAP    Pulmonary hypertension due to left ventricular diastolic dysfunction (CMS/[facility])    Recurrent major depressive disorder (CMS/[facility])    Blood pressure 123/53, pulse 80, temperature 99.4 °F (37.4 °C), temperature source Oral, resp. rate 18, height 4' 11\" (1.499 m), weight 90.2 kg (198 lb 13.7 oz), SpO2 97 %.    Subjective:  Symptoms:  Stable.  No shortness of breath, chest pain, headache, chest pressure, diarrhea or anxiety.    Diet:  Adequate intake.  No nausea or vomiting.    Pain:  She complains of pain that is mild.  She reports pain is improving.  Pain is well controlled.      Objective:  General Appearance:  Comfortable.    Vital signs: (most recent): Blood pressure 123/53, pulse 80, temperature 99.4 °F (37.4 °C), temperature source Oral, resp. rate 18, height 4' 11\" (1.499 m), weight 90.2 kg (198 lb 13.7 oz), SpO2 97 %.  No fever.    HEENT: Normal HEENT exam.    Neurological: Patient is alert and oriented to person, place and time.  Normal strength.      Assessment:    Condition: In stable condition.  Improving.   (Overall doing well motor function upper lower 4+ range, blood pressure is stable     Patient may be discharged however will ask hospitalist to give us some input regarding discharge planning with her blood pressure issues).       Kenny Valencia MD       Yes

## 2023-09-18 NOTE — H&P ADULT - HISTORY OF PRESENT ILLNESS
Patient is a 89 y/o M who lives alone and with a significant PMHx of CAD with stents, DM and a significant PSHx of appendectomy who presented with 3 day history of severe cough and weakness. Patient reports that 3 days ago he developed severe cough associated with weakness, and fatigue. Patient denies associated SOB, chest pain, and fever. Patient reports that he usually walks to the [employer] center to get his food but due to the cough and weakness he has not been able to. Patient denies any recent fevers, chills, headache, dizziness, lightheadedness, chest pain, palpitations, shortness of breath, nausea, vomiting, abdominal pain, diarrhea, constipation, melena, hematochezia, dysuria, urinary frequency, or urgency. Patient denies any other complains at this time.

## 2023-09-18 NOTE — H&P ADULT - NSHPPHYSICALEXAM_GEN_ALL_CORE
PHYSICAL EXAM:  GENERAL: NAD, speaks in full sentences, no signs of respiratory distress  HEAD:  Atraumatic, Normocephalic  EYES: EOMI, PERRLA, conjunctiva and sclera clear  NECK: Supple  CHEST/LUNG: Clear to auscultation bilaterally; No wheeze; No crackles; No accessory muscles used  HEART: Regular rate and rhythm; No murmurs;   ABDOMEN: Soft, Nontender, Nondistended; Bowel sounds present; No guarding  EXTREMITIES:  2+ Peripheral Pulses, No edema  PSYCH: AAOx3  NEUROLOGY: non-focal  SKIN: No rashes or lesions

## 2023-09-18 NOTE — ED PROVIDER NOTE - OBJECTIVE STATEMENT
89 male with hx of DM, CAD.   Pt presenting to the ED reporting cough & dyspnea since yesterday.  + COVID exposure.  No recent travel, hospitalization, or antibiotics.  Patient vaccinated for COVID.

## 2023-09-18 NOTE — ED ADULT NURSE NOTE - CAS TRG GEN SKIN COLOR
Your mammogram and US are normal. You should make an appointment for another mammogram in 1 year. JONI Hebert Normal for race

## 2023-09-18 NOTE — ED PROVIDER NOTE - CLINICAL SUMMARY MEDICAL DECISION MAKING FREE TEXT BOX
89 male with DM & CAD presenting to the ED with dyspnea with cough after recent COVID exposure.  Patient vaccinated for COVID ready.  Vitals with hypoxia on room air improved with supplemental O2.  Low-grade temperature.  Symptoms concerning for acute viral illness VS pneumonia.    Nontoxic appearing, n/v intact.  Airway intact, no respiratory distress.    Plan to obtain:    -Labs, EKG, CXR, steroids/dexamethasone, respiratory support as needed, admit    Lab values with mild elevated troponin.  COVID +    Independent interpretation of the EKG: Sinus at 69, normal axis, normal intervals, nonspecific ST changes, LVH    Independent interpretation of XR: Increased interstitial markings b/l, no effusion/pntx, poor inspiratory effort    Patient feeling better with oxygen ministration.  Noted to have mild elevated troponin.  Aspirin & dexamethasone given.    Patient requires inpatient admission for further care & stabilization.  Care signed out to inpatient team.

## 2023-09-18 NOTE — H&P ADULT - PROBLEM SELECTOR PLAN 1
P/w cough, and weakness.   Likely 2/2 to COVID.  Hypoxic to 90% on room air.   O2 supplementation. Titrate off as tolerated.   Start decadron.   Hold off remdesevir due to THOMAS.   Tessalon perle.   Tylenol.   Supportive measure.   Incentive spirometer.

## 2023-09-18 NOTE — ED ADULT NURSE NOTE - OBJECTIVE STATEMENT
Patient c/o worsening SOB & cough with white sputum since yesterday. Patient denies pain, NVD or fever.

## 2023-09-18 NOTE — ED ADULT NURSE NOTE - NSFALLRISKINTERV_ED_ALL_ED
Communicate fall risk and risk factors to all staff, patient, and family/Provide patient with walking aids/Provide visual cue: yellow wristband, yellow gown, etc/Reinforce activity limits and safety measures with patient and family/Call bell, personal items and telephone in reach/Instruct patient to call for assistance before getting out of bed/chair/stretcher/Non-slip footwear applied when patient is off stretcher/[place] to call system/Physically safe environment - no spills, clutter or unnecessary equipment/Purposeful Proactive Rounding/Room/bathroom lighting operational, light cord in reach

## 2023-09-19 DIAGNOSIS — J96.01 ACUTE RESPIRATORY FAILURE WITH HYPOXIA: ICD-10-CM

## 2023-09-19 DIAGNOSIS — Z29.9 ENCOUNTER FOR PROPHYLACTIC MEASURES, UNSPECIFIED: ICD-10-CM

## 2023-09-19 DIAGNOSIS — U07.1 COVID-19: ICD-10-CM

## 2023-09-19 DIAGNOSIS — I25.10 ATHEROSCLEROTIC HEART DISEASE OF NATIVE CORONARY ARTERY WITHOUT ANGINA PECTORIS: ICD-10-CM

## 2023-09-19 DIAGNOSIS — N17.9 ACUTE KIDNEY FAILURE, UNSPECIFIED: ICD-10-CM

## 2023-09-19 DIAGNOSIS — E11.9 TYPE 2 DIABETES MELLITUS WITHOUT COMPLICATIONS: ICD-10-CM

## 2023-09-19 LAB
A1C WITH ESTIMATED AVERAGE GLUCOSE RESULT: 5.8 % — HIGH (ref 4–5.6)
ANION GAP SERPL CALC-SCNC: 8 MMOL/L — SIGNIFICANT CHANGE UP (ref 5–17)
BUN SERPL-MCNC: 40 MG/DL — HIGH (ref 7–18)
CALCIUM SERPL-MCNC: 9.1 MG/DL — SIGNIFICANT CHANGE UP (ref 8.4–10.5)
CHLORIDE SERPL-SCNC: 104 MMOL/L — SIGNIFICANT CHANGE UP (ref 96–108)
CO2 SERPL-SCNC: 26 MMOL/L — SIGNIFICANT CHANGE UP (ref 22–31)
CREAT SERPL-MCNC: 1.23 MG/DL — SIGNIFICANT CHANGE UP (ref 0.5–1.3)
EGFR: 54 ML/MIN/1.73M2 — LOW
ESTIMATED AVERAGE GLUCOSE: 120 MG/DL — HIGH (ref 68–114)
GLUCOSE BLDC GLUCOMTR-MCNC: 109 MG/DL — HIGH (ref 70–99)
GLUCOSE BLDC GLUCOMTR-MCNC: 206 MG/DL — HIGH (ref 70–99)
GLUCOSE BLDC GLUCOMTR-MCNC: 219 MG/DL — HIGH (ref 70–99)
GLUCOSE BLDC GLUCOMTR-MCNC: 230 MG/DL — HIGH (ref 70–99)
GLUCOSE SERPL-MCNC: 110 MG/DL — HIGH (ref 70–99)
HCT VFR BLD CALC: 34 % — LOW (ref 39–50)
HGB BLD-MCNC: 10.8 G/DL — LOW (ref 13–17)
MAGNESIUM SERPL-MCNC: 2.4 MG/DL — SIGNIFICANT CHANGE UP (ref 1.6–2.6)
MCHC RBC-ENTMCNC: 28.6 PG — SIGNIFICANT CHANGE UP (ref 27–34)
MCHC RBC-ENTMCNC: 31.8 GM/DL — LOW (ref 32–36)
MCV RBC AUTO: 89.9 FL — SIGNIFICANT CHANGE UP (ref 80–100)
NRBC # BLD: 0 /100 WBCS — SIGNIFICANT CHANGE UP (ref 0–0)
PHOSPHATE SERPL-MCNC: 4.6 MG/DL — HIGH (ref 2.5–4.5)
PLATELET # BLD AUTO: 138 K/UL — LOW (ref 150–400)
POTASSIUM SERPL-MCNC: 4.5 MMOL/L — SIGNIFICANT CHANGE UP (ref 3.5–5.3)
POTASSIUM SERPL-SCNC: 4.5 MMOL/L — SIGNIFICANT CHANGE UP (ref 3.5–5.3)
RBC # BLD: 3.78 M/UL — LOW (ref 4.2–5.8)
RBC # FLD: 15.3 % — HIGH (ref 10.3–14.5)
SODIUM SERPL-SCNC: 138 MMOL/L — SIGNIFICANT CHANGE UP (ref 135–145)
TROPONIN I, HIGH SENSITIVITY RESULT: 101.5 NG/L — HIGH
WBC # BLD: 5.37 K/UL — SIGNIFICANT CHANGE UP (ref 3.8–10.5)
WBC # FLD AUTO: 5.37 K/UL — SIGNIFICANT CHANGE UP (ref 3.8–10.5)

## 2023-09-19 PROCEDURE — 99233 SBSQ HOSP IP/OBS HIGH 50: CPT

## 2023-09-19 RX ORDER — ENOXAPARIN SODIUM 100 MG/ML
30 INJECTION SUBCUTANEOUS EVERY 24 HOURS
Refills: 0 | Status: DISCONTINUED | OUTPATIENT
Start: 2023-09-19 | End: 2023-09-23

## 2023-09-19 RX ORDER — REMDESIVIR 5 MG/ML
200 INJECTION INTRAVENOUS EVERY 24 HOURS
Refills: 0 | Status: COMPLETED | OUTPATIENT
Start: 2023-09-19 | End: 2023-09-20

## 2023-09-19 RX ORDER — METOPROLOL TARTRATE 50 MG
25 TABLET ORAL
Refills: 0 | Status: DISCONTINUED | OUTPATIENT
Start: 2023-09-19 | End: 2023-09-21

## 2023-09-19 RX ORDER — LOSARTAN POTASSIUM 100 MG/1
100 TABLET, FILM COATED ORAL DAILY
Refills: 0 | Status: DISCONTINUED | OUTPATIENT
Start: 2023-09-19 | End: 2023-09-23

## 2023-09-19 RX ORDER — ASPIRIN/CALCIUM CARB/MAGNESIUM 324 MG
81 TABLET ORAL DAILY
Refills: 0 | Status: DISCONTINUED | OUTPATIENT
Start: 2023-09-19 | End: 2023-09-23

## 2023-09-19 RX ORDER — ENOXAPARIN SODIUM 100 MG/ML
40 INJECTION SUBCUTANEOUS
Qty: 0 | Refills: 0 | DISCHARGE

## 2023-09-19 RX ORDER — ONDANSETRON 8 MG/1
4 TABLET, FILM COATED ORAL EVERY 8 HOURS
Refills: 0 | Status: DISCONTINUED | OUTPATIENT
Start: 2023-09-19 | End: 2023-09-23

## 2023-09-19 RX ORDER — INSULIN LISPRO 100/ML
VIAL (ML) SUBCUTANEOUS
Refills: 0 | Status: DISCONTINUED | OUTPATIENT
Start: 2023-09-19 | End: 2023-09-23

## 2023-09-19 RX ORDER — DEXAMETHASONE 0.5 MG/5ML
6 ELIXIR ORAL DAILY
Refills: 0 | Status: DISCONTINUED | OUTPATIENT
Start: 2023-09-19 | End: 2023-09-23

## 2023-09-19 RX ORDER — REMDESIVIR 5 MG/ML
INJECTION INTRAVENOUS
Refills: 0 | Status: DISCONTINUED | OUTPATIENT
Start: 2023-09-19 | End: 2023-09-23

## 2023-09-19 RX ORDER — ACETAMINOPHEN 500 MG
650 TABLET ORAL EVERY 6 HOURS
Refills: 0 | Status: DISCONTINUED | OUTPATIENT
Start: 2023-09-19 | End: 2023-09-23

## 2023-09-19 RX ORDER — DEXAMETHASONE 0.5 MG/5ML
1 ELIXIR ORAL
Qty: 0 | Refills: 0 | DISCHARGE
End: 2021-03-20

## 2023-09-19 RX ORDER — LANOLIN ALCOHOL/MO/W.PET/CERES
3 CREAM (GRAM) TOPICAL AT BEDTIME
Refills: 0 | Status: DISCONTINUED | OUTPATIENT
Start: 2023-09-19 | End: 2023-09-23

## 2023-09-19 RX ORDER — GUAIFENESIN/DEXTROMETHORPHAN 600MG-30MG
10 TABLET, EXTENDED RELEASE 12 HR ORAL EVERY 4 HOURS
Refills: 0 | Status: DISCONTINUED | OUTPATIENT
Start: 2023-09-19 | End: 2023-09-21

## 2023-09-19 RX ORDER — ATORVASTATIN CALCIUM 80 MG/1
20 TABLET, FILM COATED ORAL AT BEDTIME
Refills: 0 | Status: DISCONTINUED | OUTPATIENT
Start: 2023-09-19 | End: 2023-09-23

## 2023-09-19 RX ORDER — CLOPIDOGREL BISULFATE 75 MG/1
75 TABLET, FILM COATED ORAL DAILY
Refills: 0 | Status: DISCONTINUED | OUTPATIENT
Start: 2023-09-19 | End: 2023-09-23

## 2023-09-19 RX ADMIN — Medication 2: at 11:45

## 2023-09-19 RX ADMIN — Medication 650 MILLIGRAM(S): at 01:02

## 2023-09-19 RX ADMIN — Medication 2: at 16:42

## 2023-09-19 RX ADMIN — Medication 6 MILLIGRAM(S): at 01:01

## 2023-09-19 RX ADMIN — CLOPIDOGREL BISULFATE 75 MILLIGRAM(S): 75 TABLET, FILM COATED ORAL at 11:40

## 2023-09-19 RX ADMIN — Medication 81 MILLIGRAM(S): at 11:40

## 2023-09-19 RX ADMIN — Medication 6 MILLIGRAM(S): at 07:35

## 2023-09-19 RX ADMIN — LOSARTAN POTASSIUM 100 MILLIGRAM(S): 100 TABLET, FILM COATED ORAL at 07:35

## 2023-09-19 RX ADMIN — Medication 25 MILLIGRAM(S): at 07:35

## 2023-09-19 RX ADMIN — Medication 650 MILLIGRAM(S): at 01:32

## 2023-09-19 RX ADMIN — Medication 324 MILLIGRAM(S): at 01:02

## 2023-09-19 NOTE — PROGRESS NOTE ADULT - SUBJECTIVE AND OBJECTIVE BOX
[facility] Medicine    Patient is a 89y old  Male who presents with a chief complaint of hypxoia    SUBJECTIVE / OVERNIGHT EVENTS: No acute events overnight. Patient denies he is SOB and reports he was never SOB. Says that he is a very healthy 89 year old and has already survived COVID once.     MEDICATIONS  (STANDING):  aspirin enteric coated 81 milliGRAM(s) Oral daily  atorvastatin 20 milliGRAM(s) Oral at bedtime  clopidogrel Tablet 75 milliGRAM(s) Oral daily  dexAMETHasone     Tablet 6 milliGRAM(s) Oral daily  enoxaparin Injectable 30 milliGRAM(s) SubCutaneous every 24 hours  insulin lispro (ADMELOG) corrective regimen sliding scale   SubCutaneous Before meals and at bedtime  levoFLOXacin IVPB 750 milliGRAM(s) IV Intermittent every 48 hours  losartan 100 milliGRAM(s) Oral daily  metoprolol tartrate 25 milliGRAM(s) Oral two times a day  remdesivir  IVPB   IV Intermittent     MEDICATIONS  (PRN):  acetaminophen     Tablet .. 650 milliGRAM(s) Oral every 6 hours PRN Temp greater or equal to 38C (100.4F), Mild Pain (1 - 3)  benzonatate 100 milliGRAM(s) Oral three times a day PRN Cough  guaifenesin/dextromethorphan Oral Liquid 10 milliLiter(s) Oral every 4 hours PRN Cough  melatonin 3 milliGRAM(s) Oral at bedtime PRN Insomnia  ondansetron Injectable 4 milliGRAM(s) IV Push every 8 hours PRN Nausea and/or Vomiting      Vital Signs Last 24 Hrs  T(C): 36.4 (09-19-23 @ 19:47)  T(F): 97.6 (09-19-23 @ 19:47), Max: 97.9 (09-19-23 @ 11:20)  HR: 48 (09-19-23 @ 19:47) (41 - 83)  BP: 109/61 (09-19-23 @ 19:47)  BP(mean): --  RR: 18 (09-19-23 @ 19:47) (16 - 18)  SpO2: 95% (09-19-23 @ 19:47) (95% - 100%)  Wt(kg): --    CAPILLARY BLOOD GLUCOSE      POCT Blood Glucose.: 230 mg/dL (19 Sep 2023 16:37)  POCT Blood Glucose.: 206 mg/dL (19 Sep 2023 12:59)  POCT Blood Glucose.: 219 mg/dL (19 Sep 2023 11:43)  POCT Blood Glucose.: 109 mg/dL (19 Sep 2023 08:22)    I&O's Summary      PHYSICAL EXAM:  GENERAL: NAD, well-developed, lying in bed  HEAD:  Atraumatic, Normocephalic  EYES: E conjunctiva and sclera clear  NECK: Supple, No JVD  CHEST/LUNG: Clear to auscultation bilaterally; No wheeze, rhonchi, rales  HEART: Regular rate and rhythm; No murmurs, rubs, or gallops  ABDOMEN: Soft, Nontender, Nondistended; Bowel sounds present  EXTREMITIES:  2+ Peripheral Pulses, No clubbing, cyanosis, or edema  PSYCH: AAOx3, pleasant, cooperative  NEUROLOGY: non-focal  SKIN: No rashes or lesions    LABS:                        10.8   5.37  )-----------( 138      ( 19 Sep 2023 05:10 )             34.0     09-19    138  |  104  |  40<H>  ----------------------------<  110<H>  4.5   |  26  |  1.23    Ca    9.1      19 Sep 2023 05:10  Phos  4.6     09-19  Mg     2.4     09-19    TPro  7.0  /  Alb  3.0<L>  /  TBili  0.7  /  DBili  x   /  AST  26  /  ALT  27  /  AlkPhos  67  09-18          Urinalysis Basic - ( 19 Sep 2023 05:10 )    Color: x / Appearance: x / SG: x / pH: x  Gluc: 110 mg/dL / Ketone: x  / Bili: x / Urobili: x   Blood: x / Protein: x / Nitrite: x   Leuk Esterase: x / RBC: x / WBC x   Sq Epi: x / Non Sq Epi: x / Bacteria: x        RADIOLOGY & ADDITIONAL TESTS:    Imaging Personally Reviewed:    Consultant(s) Notes Reviewed:      Care Discussed with Consultants/Other Providers:    Assessment and Plan:

## 2023-09-19 NOTE — PHARMACOTHERAPY INTERVENTION NOTE - COMMENTS
Pharmacy-initiated consult: Inpatient pharmacy requested approval for Remdesivir. Patient meets criteria for use as the patient has oxygen requirements and positive for SARS-CoV-2.     Maikel Torres, PharmD  Clinical Pharmacy Specialist, Infectious Diseases  Tele-Antimicrobial Stewardship Program (Tele-ASP)  Tele-ASP Phone: [phone]

## 2023-09-19 NOTE — PROGRESS NOTE ADULT - PROBLEM SELECTOR PLAN 2
P/w Cr of 1.38 (baseline 1.0)  Likely prerenal in setting of decreased oral intake.   Avoid nephrotoxic agents.   F/U BMP, improved to 1.23 today

## 2023-09-19 NOTE — PROGRESS NOTE ADULT - PROBLEM SELECTOR PLAN 3
Start remdesivir per protocol now that THOMAS has improved  Continue decadron  Continue O2 support PRN

## 2023-09-20 DIAGNOSIS — R00.1 BRADYCARDIA, UNSPECIFIED: ICD-10-CM

## 2023-09-20 DIAGNOSIS — I10 ESSENTIAL (PRIMARY) HYPERTENSION: ICD-10-CM

## 2023-09-20 LAB
ALBUMIN SERPL ELPH-MCNC: 2.5 G/DL — LOW (ref 3.5–5)
ALP SERPL-CCNC: 57 U/L — SIGNIFICANT CHANGE UP (ref 40–120)
ALT FLD-CCNC: 31 U/L DA — SIGNIFICANT CHANGE UP (ref 10–60)
ANION GAP SERPL CALC-SCNC: 9 MMOL/L — SIGNIFICANT CHANGE UP (ref 5–17)
AST SERPL-CCNC: 36 U/L — SIGNIFICANT CHANGE UP (ref 10–40)
BILIRUB SERPL-MCNC: 0.4 MG/DL — SIGNIFICANT CHANGE UP (ref 0.2–1.2)
BUN SERPL-MCNC: 48 MG/DL — HIGH (ref 7–18)
CALCIUM SERPL-MCNC: 8 MG/DL — LOW (ref 8.4–10.5)
CHLORIDE SERPL-SCNC: 105 MMOL/L — SIGNIFICANT CHANGE UP (ref 96–108)
CO2 SERPL-SCNC: 27 MMOL/L — SIGNIFICANT CHANGE UP (ref 22–31)
CREAT SERPL-MCNC: 1.18 MG/DL — SIGNIFICANT CHANGE UP (ref 0.5–1.3)
EGFR: 57 ML/MIN/1.73M2 — LOW
GLUCOSE BLDC GLUCOMTR-MCNC: 100 MG/DL — HIGH (ref 70–99)
GLUCOSE BLDC GLUCOMTR-MCNC: 111 MG/DL — HIGH (ref 70–99)
GLUCOSE BLDC GLUCOMTR-MCNC: 141 MG/DL — HIGH (ref 70–99)
GLUCOSE BLDC GLUCOMTR-MCNC: 185 MG/DL — HIGH (ref 70–99)
GLUCOSE BLDC GLUCOMTR-MCNC: 259 MG/DL — HIGH (ref 70–99)
GLUCOSE SERPL-MCNC: 115 MG/DL — HIGH (ref 70–99)
HCT VFR BLD CALC: 32.8 % — LOW (ref 39–50)
HGB BLD-MCNC: 10.7 G/DL — LOW (ref 13–17)
MAGNESIUM SERPL-MCNC: 2.4 MG/DL — SIGNIFICANT CHANGE UP (ref 1.6–2.6)
MCHC RBC-ENTMCNC: 28.8 PG — SIGNIFICANT CHANGE UP (ref 27–34)
MCHC RBC-ENTMCNC: 32.6 GM/DL — SIGNIFICANT CHANGE UP (ref 32–36)
MCV RBC AUTO: 88.4 FL — SIGNIFICANT CHANGE UP (ref 80–100)
NRBC # BLD: 0 /100 WBCS — SIGNIFICANT CHANGE UP (ref 0–0)
PHOSPHATE SERPL-MCNC: 3.8 MG/DL — SIGNIFICANT CHANGE UP (ref 2.5–4.5)
PLATELET # BLD AUTO: 154 K/UL — SIGNIFICANT CHANGE UP (ref 150–400)
POTASSIUM SERPL-MCNC: 3.9 MMOL/L — SIGNIFICANT CHANGE UP (ref 3.5–5.3)
POTASSIUM SERPL-SCNC: 3.9 MMOL/L — SIGNIFICANT CHANGE UP (ref 3.5–5.3)
PROT SERPL-MCNC: 6.1 G/DL — SIGNIFICANT CHANGE UP (ref 6–8.3)
RBC # BLD: 3.71 M/UL — LOW (ref 4.2–5.8)
RBC # FLD: 14.9 % — HIGH (ref 10.3–14.5)
SODIUM SERPL-SCNC: 141 MMOL/L — SIGNIFICANT CHANGE UP (ref 135–145)
TSH SERPL-MCNC: 2.92 UU/ML — SIGNIFICANT CHANGE UP (ref 0.34–4.82)
WBC # BLD: 6.23 K/UL — SIGNIFICANT CHANGE UP (ref 3.8–10.5)
WBC # FLD AUTO: 6.23 K/UL — SIGNIFICANT CHANGE UP (ref 3.8–10.5)

## 2023-09-20 PROCEDURE — 99232 SBSQ HOSP IP/OBS MODERATE 35: CPT | Mod: GC

## 2023-09-20 RX ORDER — INFLUENZA VIRUS VACCINE 15; 15; 15; 15 UG/.5ML; UG/.5ML; UG/.5ML; UG/.5ML
0.7 SUSPENSION INTRAMUSCULAR ONCE
Refills: 0 | Status: DISCONTINUED | OUTPATIENT
Start: 2023-09-20 | End: 2023-09-23

## 2023-09-20 RX ORDER — REMDESIVIR 5 MG/ML
100 INJECTION INTRAVENOUS EVERY 24 HOURS
Refills: 0 | Status: DISCONTINUED | OUTPATIENT
Start: 2023-09-21 | End: 2023-09-23

## 2023-09-20 RX ADMIN — REMDESIVIR 200 MILLIGRAM(S): 5 INJECTION INTRAVENOUS at 01:00

## 2023-09-20 RX ADMIN — Medication 25 MILLIGRAM(S): at 00:12

## 2023-09-20 RX ADMIN — Medication 25 MILLIGRAM(S): at 17:22

## 2023-09-20 RX ADMIN — REMDESIVIR 200 MILLIGRAM(S): 5 INJECTION INTRAVENOUS at 23:29

## 2023-09-20 RX ADMIN — ATORVASTATIN CALCIUM 20 MILLIGRAM(S): 80 TABLET, FILM COATED ORAL at 00:11

## 2023-09-20 RX ADMIN — Medication 3: at 17:21

## 2023-09-20 RX ADMIN — Medication 81 MILLIGRAM(S): at 11:59

## 2023-09-20 RX ADMIN — Medication 600 MILLIGRAM(S): at 17:22

## 2023-09-20 RX ADMIN — CLOPIDOGREL BISULFATE 75 MILLIGRAM(S): 75 TABLET, FILM COATED ORAL at 11:59

## 2023-09-20 RX ADMIN — ATORVASTATIN CALCIUM 20 MILLIGRAM(S): 80 TABLET, FILM COATED ORAL at 21:25

## 2023-09-20 RX ADMIN — Medication 6 MILLIGRAM(S): at 06:29

## 2023-09-20 RX ADMIN — ENOXAPARIN SODIUM 30 MILLIGRAM(S): 100 INJECTION SUBCUTANEOUS at 11:59

## 2023-09-20 RX ADMIN — Medication 600 MILLIGRAM(S): at 06:29

## 2023-09-20 RX ADMIN — Medication 1: at 11:59

## 2023-09-20 NOTE — PATIENT PROFILE ADULT - PUBLIC BENEFITS - OTHER DETAILS
Pt says he would not qualify but would want assistance [facility].

## 2023-09-20 NOTE — PROGRESS NOTE ADULT - ATTENDING COMMENTS
89 yo M who lives alone and with of CAD with stents, DM2  who presented with 3 day history of severe cough and weakness. Patient was found to have COVID-19. Patient is being admitted for AHRF 2/2 to COVID. 89 yo M who lives alone and with of CAD with stents, DM2  who presented with 3 day history of severe cough and weakness. Patient was found to have COVID-19. Patient is being admitted for AHRF 2/2 to [facility].    Patient was seen and examined, he denies shortness of breath, does have some cough. He is saturating well on RA    labs reviewed- cbc, bmp, CE     PE as above     A/P:  #Acute Hypoxic respiratory failure - resolved   #Covid pneumonia   #CAD w/ stents   #Elevated Troponin- suspect demand ischemia   #VANDANA   #DM  #THOMAS  #dvt ppx     Plan;  -C/w decadron, remdesevir, monitor O2 sats   -No evidence of bacterial pneumonia, DC IV abx   -c/w asa, plavix   -Pt noted w/ elevated troponin, no cp or sob. Likely type 2 MI- demand ischemia from hypoxia   -ECHO as out-patient for VANDANA   -Scr improved   -DC planning

## 2023-09-20 NOTE — PATIENT PROFILE ADULT - FALL HARM RISK - HARM RISK INTERVENTIONS

## 2023-09-20 NOTE — PROGRESS NOTE ADULT - PROBLEM SELECTOR PLAN 2
found to have HR as low as 46bpm  -f/u EKG  -loud systolic murmur on exam radiating to carotids concerning for AS  -f/u TTE  -monitor HR  -will consider adjusting metoprolol and telemetry

## 2023-09-20 NOTE — PATIENT PROFILE ADULT - FUNCTIONAL ASSESSMENT - BASIC MOBILITY 6.
3-calculated by average/Not able to assess (calculate score using [facility] averaging method)

## 2023-09-20 NOTE — PROGRESS NOTE ADULT - PROBLEM SELECTOR PLAN 3
RESOLVED  P/w Cr of 1.38 (baseline 1.0)  Likely prerenal in setting of decreased oral intake.   Avoid nephrotoxic agents.   Trend srCr 1.38>1.23>1.18

## 2023-09-21 LAB
ALBUMIN SERPL ELPH-MCNC: 2.6 G/DL — LOW (ref 3.5–5)
ALP SERPL-CCNC: 60 U/L — SIGNIFICANT CHANGE UP (ref 40–120)
ALT FLD-CCNC: 63 U/L DA — HIGH (ref 10–60)
ANION GAP SERPL CALC-SCNC: 7 MMOL/L — SIGNIFICANT CHANGE UP (ref 5–17)
APPEARANCE UR: CLEAR — SIGNIFICANT CHANGE UP
AST SERPL-CCNC: 65 U/L — HIGH (ref 10–40)
BACTERIA # UR AUTO: ABNORMAL /HPF
BASOPHILS # BLD AUTO: 0 K/UL — SIGNIFICANT CHANGE UP (ref 0–0.2)
BASOPHILS NFR BLD AUTO: 0 % — SIGNIFICANT CHANGE UP (ref 0–2)
BILIRUB SERPL-MCNC: 0.5 MG/DL — SIGNIFICANT CHANGE UP (ref 0.2–1.2)
BILIRUB UR-MCNC: NEGATIVE — SIGNIFICANT CHANGE UP
BUN SERPL-MCNC: 43 MG/DL — HIGH (ref 7–18)
CALCIUM SERPL-MCNC: 8.6 MG/DL — SIGNIFICANT CHANGE UP (ref 8.4–10.5)
CHLORIDE SERPL-SCNC: 105 MMOL/L — SIGNIFICANT CHANGE UP (ref 96–108)
CO2 SERPL-SCNC: 28 MMOL/L — SIGNIFICANT CHANGE UP (ref 22–31)
COLOR SPEC: YELLOW — SIGNIFICANT CHANGE UP
CREAT SERPL-MCNC: 1.01 MG/DL — SIGNIFICANT CHANGE UP (ref 0.5–1.3)
D DIMER BLD IA.RAPID-MCNC: 797 NG/ML DDU — HIGH
DIFF PNL FLD: ABNORMAL
EGFR: 68 ML/MIN/1.73M2 — SIGNIFICANT CHANGE UP
EOSINOPHIL # BLD AUTO: 0 K/UL — SIGNIFICANT CHANGE UP (ref 0–0.5)
EOSINOPHIL NFR BLD AUTO: 0 % — SIGNIFICANT CHANGE UP (ref 0–6)
GLUCOSE BLDC GLUCOMTR-MCNC: 137 MG/DL — HIGH (ref 70–99)
GLUCOSE BLDC GLUCOMTR-MCNC: 149 MG/DL — HIGH (ref 70–99)
GLUCOSE BLDC GLUCOMTR-MCNC: 150 MG/DL — HIGH (ref 70–99)
GLUCOSE BLDC GLUCOMTR-MCNC: 96 MG/DL — SIGNIFICANT CHANGE UP (ref 70–99)
GLUCOSE SERPL-MCNC: 88 MG/DL — SIGNIFICANT CHANGE UP (ref 70–99)
GLUCOSE UR QL: NEGATIVE MG/DL — SIGNIFICANT CHANGE UP
HCT VFR BLD CALC: 35.7 % — LOW (ref 39–50)
HGB BLD-MCNC: 11.4 G/DL — LOW (ref 13–17)
IMM GRANULOCYTES NFR BLD AUTO: 0.4 % — SIGNIFICANT CHANGE UP (ref 0–0.9)
KETONES UR-MCNC: NEGATIVE MG/DL — SIGNIFICANT CHANGE UP
LEUKOCYTE ESTERASE UR-ACNC: ABNORMAL
LYMPHOCYTES # BLD AUTO: 1.19 K/UL — SIGNIFICANT CHANGE UP (ref 1–3.3)
LYMPHOCYTES # BLD AUTO: 24.8 % — SIGNIFICANT CHANGE UP (ref 13–44)
MAGNESIUM SERPL-MCNC: 2.2 MG/DL — SIGNIFICANT CHANGE UP (ref 1.6–2.6)
MCHC RBC-ENTMCNC: 28.4 PG — SIGNIFICANT CHANGE UP (ref 27–34)
MCHC RBC-ENTMCNC: 31.9 GM/DL — LOW (ref 32–36)
MCV RBC AUTO: 88.8 FL — SIGNIFICANT CHANGE UP (ref 80–100)
MONOCYTES # BLD AUTO: 0.46 K/UL — SIGNIFICANT CHANGE UP (ref 0–0.9)
MONOCYTES NFR BLD AUTO: 9.6 % — SIGNIFICANT CHANGE UP (ref 2–14)
NEUTROPHILS # BLD AUTO: 3.12 K/UL — SIGNIFICANT CHANGE UP (ref 1.8–7.4)
NEUTROPHILS NFR BLD AUTO: 65.2 % — SIGNIFICANT CHANGE UP (ref 43–77)
NITRITE UR-MCNC: NEGATIVE — SIGNIFICANT CHANGE UP
NRBC # BLD: 0 /100 WBCS — SIGNIFICANT CHANGE UP (ref 0–0)
PH UR: 5 — SIGNIFICANT CHANGE UP (ref 5–8)
PHOSPHATE SERPL-MCNC: 2.5 MG/DL — SIGNIFICANT CHANGE UP (ref 2.5–4.5)
PLATELET # BLD AUTO: 173 K/UL — SIGNIFICANT CHANGE UP (ref 150–400)
POTASSIUM SERPL-MCNC: 4.2 MMOL/L — SIGNIFICANT CHANGE UP (ref 3.5–5.3)
POTASSIUM SERPL-SCNC: 4.2 MMOL/L — SIGNIFICANT CHANGE UP (ref 3.5–5.3)
PROT SERPL-MCNC: 6.5 G/DL — SIGNIFICANT CHANGE UP (ref 6–8.3)
PROT UR-MCNC: NEGATIVE MG/DL — SIGNIFICANT CHANGE UP
RBC # BLD: 4.02 M/UL — LOW (ref 4.2–5.8)
RBC # FLD: 15.4 % — HIGH (ref 10.3–14.5)
RBC CASTS # UR COMP ASSIST: 17 /HPF — HIGH (ref 0–4)
SODIUM SERPL-SCNC: 140 MMOL/L — SIGNIFICANT CHANGE UP (ref 135–145)
SP GR SPEC: 1.02 — SIGNIFICANT CHANGE UP (ref 1–1.03)
UROBILINOGEN FLD QL: 0.2 MG/DL — SIGNIFICANT CHANGE UP (ref 0.2–1)
WBC # BLD: 4.79 K/UL — SIGNIFICANT CHANGE UP (ref 3.8–10.5)
WBC # FLD AUTO: 4.79 K/UL — SIGNIFICANT CHANGE UP (ref 3.8–10.5)
WBC UR QL: 45 /HPF — HIGH (ref 0–5)

## 2023-09-21 PROCEDURE — 99232 SBSQ HOSP IP/OBS MODERATE 35: CPT

## 2023-09-21 RX ORDER — BENZOCAINE AND MENTHOL 5; 1 G/100ML; G/100ML
1 LIQUID ORAL
Refills: 0 | Status: DISCONTINUED | OUTPATIENT
Start: 2023-09-21 | End: 2023-09-23

## 2023-09-21 RX ORDER — SENNA PLUS 8.6 MG/1
2 TABLET ORAL AT BEDTIME
Refills: 0 | Status: DISCONTINUED | OUTPATIENT
Start: 2023-09-21 | End: 2023-09-23

## 2023-09-21 RX ORDER — METOPROLOL TARTRATE 50 MG
12.5 TABLET ORAL EVERY 12 HOURS
Refills: 0 | Status: DISCONTINUED | OUTPATIENT
Start: 2023-09-21 | End: 2023-09-23

## 2023-09-21 RX ORDER — POLYETHYLENE GLYCOL 3350 17 G/17G
17 POWDER, FOR SOLUTION ORAL DAILY
Refills: 0 | Status: DISCONTINUED | OUTPATIENT
Start: 2023-09-21 | End: 2023-09-22

## 2023-09-21 RX ADMIN — LOSARTAN POTASSIUM 100 MILLIGRAM(S): 100 TABLET, FILM COATED ORAL at 05:40

## 2023-09-21 RX ADMIN — Medication 600 MILLIGRAM(S): at 18:42

## 2023-09-21 RX ADMIN — Medication 600 MILLIGRAM(S): at 05:42

## 2023-09-21 RX ADMIN — Medication 25 MILLIGRAM(S): at 05:40

## 2023-09-21 RX ADMIN — POLYETHYLENE GLYCOL 3350 17 GRAM(S): 17 POWDER, FOR SOLUTION ORAL at 13:12

## 2023-09-21 RX ADMIN — Medication 6 MILLIGRAM(S): at 05:40

## 2023-09-21 RX ADMIN — SENNA PLUS 2 TABLET(S): 8.6 TABLET ORAL at 21:54

## 2023-09-21 RX ADMIN — ATORVASTATIN CALCIUM 20 MILLIGRAM(S): 80 TABLET, FILM COATED ORAL at 21:54

## 2023-09-21 RX ADMIN — BENZOCAINE AND MENTHOL 1 LOZENGE: 5; 1 LIQUID ORAL at 13:37

## 2023-09-21 NOTE — PROGRESS NOTE ADULT - SUBJECTIVE AND OBJECTIVE BOX
PGY-1 Progress Note discussed with attending    PAGER #: [[phone]] TILL 5:00 PM  PLEASE CONTACT ON CALL TEAM:  - On Call Team (Please refer to Saskia) FROM 5:00 PM - 8:30PM  - Nightfloat Team FROM 8:30 -7:30 AM    CC: Patient is a 89y old  Male who presents with a chief complaint of Hypoxia, SOB (19 Sep 2023 22:45)      OVERNIGHT EVENTS:    SUBJECTIVE / INTERVAL HPI: Patient seen and examined at bedside.     ROS:  CONSTITUTIONAL: No weakness, fevers or chills  EYES/ENT: No visual changes;  No vertigo or throat pain   NECK: No pain or stiffness  RESPIRATORY: No cough, wheezing, hemoptysis; No shortness of breath  CARDIOVASCULAR: No chest pain or palpitations  GASTROINTESTINAL: No abdominal or epigastric pain. No nausea, vomiting, or hematemesis; No diarrhea or constipation. No melena or hematochezia.  GENITOURINARY: No dysuria, frequency or hematuria  NEUROLOGICAL: No numbness or weakness  SKIN: No itching, burning, rashes, or lesions     VITAL SIGNS:  Vital Signs Last 24 Hrs  T(C): 36.6 (21 Sep 2023 08:05), Max: 36.9 (20 Sep 2023 19:41)  T(F): 97.9 (21 Sep 2023 08:05), Max: 98.4 (20 Sep 2023 19:41)  HR: 53 (21 Sep 2023 08:05) (53 - 81)  BP: 133/68 (21 Sep 2023 08:05) (111/62 - 167/93)  BP(mean): 85 (21 Sep 2023 05:11) (85 - 116)  RR: 18 (21 Sep 2023 08:05) (18 - 18)  SpO2: 97% (21 Sep 2023 08:05) (97% - 100%)    Parameters below as of 21 Sep 2023 08:05  Patient On (Oxygen Delivery Method): room air        PHYSICAL EXAM:    General: WDWN  HEENT: NC/AT; PERRL, anicteric sclera; MMM  Neck: supple  Cardiovascular: +S1/S2; RRR  Respiratory: CTA B/L; no W/R/R  Gastrointestinal: soft, NT/ND; +BSx4  Extremities: WWP; no edema, clubbing or cyanosis  Vascular: 2+ radial, DP/PT pulses B/L  Skin: Warm, dry, good turgor, no rashes, or ecchymoses  Neurological: AAOx3; no focal deficits    MEDICATIONS:  MEDICATIONS  (STANDING):  aspirin enteric coated 81 milliGRAM(s) Oral daily  atorvastatin 20 milliGRAM(s) Oral at bedtime  clopidogrel Tablet 75 milliGRAM(s) Oral daily  dexAMETHasone     Tablet 6 milliGRAM(s) Oral daily  enoxaparin Injectable 30 milliGRAM(s) SubCutaneous every 24 hours  guaiFENesin  milliGRAM(s) Oral every 12 hours  influenza  Vaccine (HIGH DOSE) 0.7 milliLiter(s) IntraMuscular once  insulin lispro (ADMELOG) corrective regimen sliding scale   SubCutaneous Before meals and at bedtime  losartan 100 milliGRAM(s) Oral daily  metoprolol tartrate 12.5 milliGRAM(s) Oral every 12 hours  remdesivir  IVPB   IV Intermittent   remdesivir  IVPB 100 milliGRAM(s) IV Intermittent every 24 hours    MEDICATIONS  (PRN):  acetaminophen     Tablet .. 650 milliGRAM(s) Oral every 6 hours PRN Temp greater or equal to 38C (100.4F), Mild Pain (1 - 3)  benzocaine/menthol Lozenge 1 Lozenge Oral every 3 hours PRN Sore Throat  benzonatate 100 milliGRAM(s) Oral three times a day PRN Cough  guaifenesin/dextromethorphan Oral Liquid 10 milliLiter(s) Oral every 4 hours PRN Cough  melatonin 3 milliGRAM(s) Oral at bedtime PRN Insomnia  ondansetron Injectable 4 milliGRAM(s) IV Push every 8 hours PRN Nausea and/or Vomiting      ALLERGIES:  Allergies    penicillin (Rash)    Intolerances        LABS:                        11.4   4.79  )-----------( 173      ( 21 Sep 2023 06:50 )             35.7     09-    140  |  105  |  43<H>  ----------------------------<  88  4.2   |  28  |  1.01    Ca    8.6      21 Sep 2023 06:50  Phos  2.5     09-  Mg     2.2     09-    TPro  6.5  /  Alb  2.6<L>  /  TBili  0.5  /  DBili  x   /  AST  65<H>  /  ALT  63<H>  /  AlkPhos  60  -      Urinalysis Basic - ( 21 Sep 2023 10:16 )    Color: Yellow / Appearance: Clear / S.017 / pH: x  Gluc: x / Ketone: Negative mg/dL  / Bili: Negative / Urobili: 0.2 mg/dL   Blood: x / Protein: Negative mg/dL / Nitrite: Negative   Leuk Esterase: Large / RBC: 17 /HPF / WBC 45 /HPF   Sq Epi: x / Non Sq Epi: x / Bacteria: Few /HPF      CAPILLARY BLOOD GLUCOSE      POCT Blood Glucose.: 96 mg/dL (21 Sep 2023 07:45)      RADIOLOGY & ADDITIONAL TESTS: Reviewed. PGY-1 Progress Note discussed with attending    PAGER #: [[phone]] TILL 5:00 PM  PLEASE CONTACT ON CALL TEAM:  - On Call Team (Please refer to Saskia) FROM 5:00 PM - 8:30PM  - Nightfloat Team FROM 8:30 -7:30 AM    CC: Patient is a 89y old  Male who presents with a chief complaint of Hypoxia, SOB (19 Sep 2023 22:45)      OVERNIGHT EVENTS: none    SUBJECTIVE / INTERVAL HPI: Patient seen and examined at bedside. Complaining of hoarse voice and cough. Cough has dramatically improved from onset of symptoms. Says he likely is not drinking enough water. Says he has not had BM since admission. Says he was told he had a heart murmur in the past but never been told about aortic stenosis. Denies fever, chills, headache, dizziness, chest pain, palpitations, shortness of breath, abdominal pain, nausea, vomiting, diarrhea, constipation, and dysuria.      ROS:  CONSTITUTIONAL: No weakness, fevers or chills  EYES/ENT: No visual changes;  No vertigo or throat pain, +hoarse voice   NECK: No pain or stiffness  RESPIRATORY: +cough, no wheezing, hemoptysis; No shortness of breath  CARDIOVASCULAR: No chest pain or palpitations  GASTROINTESTINAL: No abdominal or epigastric pain. No nausea, vomiting, or hematemesis; No diarrhea or constipation. No melena or hematochezia.  GENITOURINARY: No dysuria, frequency or hematuria  NEUROLOGICAL: No numbness or weakness  SKIN: No itching, burning, rashes, or lesions     VITAL SIGNS:  Vital Signs Last 24 Hrs  T(C): 36.6 (21 Sep 2023 08:05), Max: 36.9 (20 Sep 2023 19:41)  T(F): 97.9 (21 Sep 2023 08:05), Max: 98.4 (20 Sep 2023 19:41)  HR: 53 (21 Sep 2023 08:05) (53 - 81)  BP: 133/68 (21 Sep 2023 08:05) (111/62 - 167/93)  BP(mean): 85 (21 Sep 2023 05:11) (85 - 116)  RR: 18 (21 Sep 2023 08:05) (18 - 18)  SpO2: 97% (21 Sep 2023 08:05) (97% - 100%)    Parameters below as of 21 Sep 2023 08:05  Patient On (Oxygen Delivery Method): room air        PHYSICAL EXAM:    General: underweight, elderly  HEENT: hard of hearing, NC/AT; PERRL, anicteric sclera; MMM, hoarse voice  Neck: supple  Cardiovascular: +S1/S2; loud systolic murmur at LUSB radiating to R carotid  Respiratory: CTA B/L; no W/R/R  Gastrointestinal: soft, NT/ND; +BSx4  Extremities: WWP; no edema, clubbing or cyanosis  Vascular: 2+ radial, DP/PT pulses B/L  Skin: Warm, dry, reduced turgor, no rashes, or ecchymoses  Neurological: AAOx3; no focal     MEDICATIONS:  MEDICATIONS  (STANDING):  aspirin enteric coated 81 milliGRAM(s) Oral daily  atorvastatin 20 milliGRAM(s) Oral at bedtime  clopidogrel Tablet 75 milliGRAM(s) Oral daily  dexAMETHasone     Tablet 6 milliGRAM(s) Oral daily  enoxaparin Injectable 30 milliGRAM(s) SubCutaneous every 24 hours  guaiFENesin  milliGRAM(s) Oral every 12 hours  influenza  Vaccine (HIGH DOSE) 0.7 milliLiter(s) IntraMuscular once  insulin lispro (ADMELOG) corrective regimen sliding scale   SubCutaneous Before meals and at bedtime  losartan 100 milliGRAM(s) Oral daily  metoprolol tartrate 12.5 milliGRAM(s) Oral every 12 hours  remdesivir  IVPB   IV Intermittent   remdesivir  IVPB 100 milliGRAM(s) IV Intermittent every 24 hours    MEDICATIONS  (PRN):  acetaminophen     Tablet .. 650 milliGRAM(s) Oral every 6 hours PRN Temp greater or equal to 38C (100.4F), Mild Pain (1 - 3)  benzocaine/menthol Lozenge 1 Lozenge Oral every 3 hours PRN Sore Throat  benzonatate 100 milliGRAM(s) Oral three times a day PRN Cough  guaifenesin/dextromethorphan Oral Liquid 10 milliLiter(s) Oral every 4 hours PRN Cough  melatonin 3 milliGRAM(s) Oral at bedtime PRN Insomnia  ondansetron Injectable 4 milliGRAM(s) IV Push every 8 hours PRN Nausea and/or Vomiting      ALLERGIES:  Allergies    penicillin (Rash)    Intolerances        LABS:                        11.4   4.79  )-----------( 173      ( 21 Sep 2023 06:50 )             35.7         140  |  105  |  43<H>  ----------------------------<  88  4.2   |  28  |  1.01    Ca    8.6      21 Sep 2023 06:50  Phos  2.5       Mg     2.2         TPro  6.5  /  Alb  2.6<L>  /  TBili  0.5  /  DBili  x   /  AST  65<H>  /  ALT  63<H>  /  AlkPhos  60        Urinalysis Basic - ( 21 Sep 2023 10:16 )    Color: Yellow / Appearance: Clear / S.017 / pH: x  Gluc: x / Ketone: Negative mg/dL  / Bili: Negative / Urobili: 0.2 mg/dL   Blood: x / Protein: Negative mg/dL / Nitrite: Negative   Leuk Esterase: Large / RBC: 17 /HPF / WBC 45 /HPF   Sq Epi: x / Non Sq Epi: x / Bacteria: Few /HPF      CAPILLARY BLOOD GLUCOSE      POCT Blood Glucose.: 96 mg/dL (21 Sep 2023 07:45)      RADIOLOGY & ADDITIONAL TESTS: Reviewed.

## 2023-09-21 NOTE — PROGRESS NOTE ADULT - ATTENDING COMMENTS
89 yo M who lives alone and with of CAD with stents, DM2  who presented with 3 day history of severe cough and weakness. Patient was found to have COVID-19. Patient is being admitted for AHRF 2/2 to [facility].    Patient was seen and examined, still w/ some sob.     labs reviewed- cbc, bmp     PE as above     A/P:  #Acute Hypoxic respiratory failure - resolved   #Covid pneumonia   #Sinus bradycardia   #CAD w/ stents   #Elevated Troponin- suspect demand ischemia   #VANDANA   #DM  #THOMAS  #dvt ppx     Plan;  -C/w decadron, remdesevir- D2 , monitor O2 sats   -c/w asa, Plavix   -Pt noted w/ elevated troponin, no cp or sob. Likely type 2 MI- demand ischemia from hypoxia   -ECHO as out-patient for VANDANA   -Scr improved   -Pt w/ sinus kermit, dec metoprolol.   -pT pending   -CM/SW for safe DC plan.

## 2023-09-22 ENCOUNTER — TRANSCRIPTION ENCOUNTER (OUTPATIENT)
Age: 88
End: 2023-09-22

## 2023-09-22 DIAGNOSIS — K59.00 CONSTIPATION, UNSPECIFIED: ICD-10-CM

## 2023-09-22 LAB
CULTURE RESULTS: SIGNIFICANT CHANGE UP
GLUCOSE BLDC GLUCOMTR-MCNC: 129 MG/DL — HIGH (ref 70–99)
GLUCOSE BLDC GLUCOMTR-MCNC: 187 MG/DL — HIGH (ref 70–99)
GLUCOSE BLDC GLUCOMTR-MCNC: 231 MG/DL — HIGH (ref 70–99)
GLUCOSE BLDC GLUCOMTR-MCNC: 264 MG/DL — HIGH (ref 70–99)
SPECIMEN SOURCE: SIGNIFICANT CHANGE UP

## 2023-09-22 PROCEDURE — 99232 SBSQ HOSP IP/OBS MODERATE 35: CPT | Mod: GC

## 2023-09-22 RX ORDER — POLYETHYLENE GLYCOL 3350 17 G/17G
17 POWDER, FOR SOLUTION ORAL
Refills: 0 | Status: DISCONTINUED | OUTPATIENT
Start: 2023-09-22 | End: 2023-09-23

## 2023-09-22 RX ADMIN — BENZOCAINE AND MENTHOL 1 LOZENGE: 5; 1 LIQUID ORAL at 17:00

## 2023-09-22 RX ADMIN — Medication 600 MILLIGRAM(S): at 05:47

## 2023-09-22 RX ADMIN — BENZOCAINE AND MENTHOL 1 LOZENGE: 5; 1 LIQUID ORAL at 10:00

## 2023-09-22 RX ADMIN — LOSARTAN POTASSIUM 100 MILLIGRAM(S): 100 TABLET, FILM COATED ORAL at 05:47

## 2023-09-22 RX ADMIN — Medication 6 MILLIGRAM(S): at 05:47

## 2023-09-22 RX ADMIN — Medication 600 MILLIGRAM(S): at 17:05

## 2023-09-22 RX ADMIN — Medication 3 MILLIGRAM(S): at 21:30

## 2023-09-22 RX ADMIN — BENZOCAINE AND MENTHOL 1 LOZENGE: 5; 1 LIQUID ORAL at 21:39

## 2023-09-22 RX ADMIN — Medication 2: at 12:00

## 2023-09-22 RX ADMIN — POLYETHYLENE GLYCOL 3350 17 GRAM(S): 17 POWDER, FOR SOLUTION ORAL at 17:05

## 2023-09-22 RX ADMIN — Medication 3: at 21:30

## 2023-09-22 RX ADMIN — ATORVASTATIN CALCIUM 20 MILLIGRAM(S): 80 TABLET, FILM COATED ORAL at 21:29

## 2023-09-22 RX ADMIN — POLYETHYLENE GLYCOL 3350 17 GRAM(S): 17 POWDER, FOR SOLUTION ORAL at 10:10

## 2023-09-22 RX ADMIN — CLOPIDOGREL BISULFATE 75 MILLIGRAM(S): 75 TABLET, FILM COATED ORAL at 10:10

## 2023-09-22 RX ADMIN — Medication 81 MILLIGRAM(S): at 10:10

## 2023-09-22 RX ADMIN — REMDESIVIR 200 MILLIGRAM(S): 5 INJECTION INTRAVENOUS at 02:37

## 2023-09-22 RX ADMIN — Medication 12.5 MILLIGRAM(S): at 17:02

## 2023-09-22 RX ADMIN — SENNA PLUS 2 TABLET(S): 8.6 TABLET ORAL at 21:29

## 2023-09-22 NOTE — PHYSICAL THERAPY INITIAL EVALUATION ADULT - MANUAL MUSCLE TESTING RESULTS, REHAB EVAL
BUE 4/5, Trunk grossly 2+/5, LEs at least 3-/5 bnut limited assessment as when requested to test pt just rapidly swung legs up and down and kept saying "here, here, here"

## 2023-09-22 NOTE — PROGRESS NOTE ADULT - PROBLEM SELECTOR PLAN 7
Hold home Actos and Januvia.   Start SSI.   A1C = 5.8 on telmisartan 80mg qd  -continue as losartan 100mg qd  - continued on home metoprolol ER 50mg as lopressor 25mg BID  -decreased lopressor to 12.5mg BID in s/o bradycardia

## 2023-09-22 NOTE — DISCHARGE NOTE PROVIDER - HOSPITAL COURSE
Patient is a 89 y/o M who lives alone and with a significant PMHx of CAD with stents, DM and a significant PSHx of appendectomy who presented with 3 day history of severe cough and weakness. Patient reports that 3 days ago he developed severe cough associated with weakness, and fatigue. Patient denies associated SOB, chest pain, and fever. Patient reports that he usually [facility] to the senior center to get [facility] food but due to the cough and weakness he has not been able to. Patient denies any recent fevers, chills, headache, dizziness, lightheadedness, chest pain, palpitations, shortness of breath, nausea, vomiting, abdominal pain, diarrhea, constipation, melena, hematochezia, dysuria, urinary frequency, or urgency. Patient denies any other complains at this time.     Problem/Plan - 1:  ·  Problem: Acute respiratory failure with hypoxia.   ·  Plan: P/w cough, and weakness.   Likely 2/2 to COVID.  Hypoxic to 90% on room air.   Weaned off O2 supplementation.   c/w decadron.   initially holding remdesevir due to THOMAS which resolved.  started on remdesivir   Tessalon perle.   Tylenol.   Supportive measures.   Incentive spirometer.     Problem/Plan - 2:  ·  Problem: Bradycardia.   ·  Plan: found to have HR as low as 46bpm  -f/u EKG  -loud systolic murmur on exam radiating to carotids concerning for AS  -f/u TTE  -monitor HR  -will consider adjusting metoprolol and telemetry.     Problem/Plan - 3:  ·  Problem: THOMAS (acute kidney injury).   ·  Plan: RESOLVED  P/w Cr of 1.38 (baseline 1.0)  Likely prerenal in setting of decreased oral intake.   Avoid nephrotoxic agents.   Trend srCr 1.38>1.23>1.18.     Problem/Plan - 4:  ·  Problem: 2019 novel coronavirus disease (COVID-19).   ·  Plan: See above.     Problem/Plan - 5:  ·  Problem: CAD (coronary artery disease).   ·  Plan: C/w home plavix, aspirin, metoprolol and atorvastatin.     Problem/Plan - 6:  ·  Problem: HTN (hypertension).   ·  Plan: on telmisartan 80mg qd  -continue as losartan 100mg qd  - continue home metoprolol ER 50mg qd.     Problem/Plan - 7:  ·  Problem: Diabetes mellitus.   ·  Plan: Hold home Actos and Januvia.   Start SSI.   A1C = 5.8. Patient is a 89 y/o M who lives alone and with a significant PMHx of CAD with stents, DM and a significant PSHx of appendectomy who presented with 3 day history of severe cough and weakness. Patient reported that 3 days prior to admission he developed severe cough associated with weakness, and fatigue. Patient denies associated SOB, chest pain, and fever. Patient reports that he usually walks to the senior center to get his food but due to the cough and weakness he has not been able to and has not been eating and drinking much less. He tested positive for COVID-19. Noted to be hypoxic to 90% on room are. Pt was admitted for AHRF 2/2 covid infection. Pt was Sstarted on supplemental oxygen and decadron, remdesevir held initially due to pre-renal THOMAS which later resolved with IVF. Once his THOMAS resolved, he was started on remdesivir and supportive medications to help with his symptoms.  Pt also noted to be bradycardic to 46's. His home dose of metoprolol ER 50mg which was reduced, currently on  lopressor 12.5mg BID with improvement of HR.  Pt noted to have systolic murmur on exam w/ physical examination findings concerning for AS, echo was ordered to evaluate the severity. However, since pt was in isolation precautions it has not been performed at the time of DC. He has hx CAD for which he was continued on his DUAP, metoprolol at reduced dose due to bradycardia and home dose of atorvastatin. HE was continued on equivalent dosed of telmisartan once his THOMAS resolved. In terms of his diabetes he was managed with SSI, A1C 5.8, BG overall well controlled.     Patient is stable for discharge and is advised to follow up with PCP as outpatient.  Please refer to patient's complete medical chart with documents for a full hospital course, for this is only a brief summary.

## 2023-09-22 NOTE — PROGRESS NOTE ADULT - PROBLEM SELECTOR PLAN 3
RESOLVED  P/w Cr of 1.38 (baseline 1.0)  Likely prerenal in setting of decreased oral intake.   Avoid nephrotoxic agents.   Trend srCr 1.38>1.23>1.18 RESOLVED  found to have HR as low as 46bpm  -f/u EKG  -loud systolic murmur on exam radiating to carotids concerning for AS  -f/u TTE  -monitor HR  -will consider adjusting metoprolol and telemetry

## 2023-09-22 NOTE — PROGRESS NOTE ADULT - SUBJECTIVE AND OBJECTIVE BOX
PGY-1 Progress Note discussed with attending    PAGER #: [[phone]] TILL 5:00 PM  PLEASE CONTACT ON CALL TEAM:  - On Call Team (Please refer to Saskia) FROM 5:00 PM - 8:30PM  - Nightfloat Team FROM 8:30 -7:30 AM    CC: Patient is a 89y old  Male who presents with a chief complaint of Hypoxia, SOB (19 Sep 2023 22:45)      OVERNIGHT EVENTS:    SUBJECTIVE / INTERVAL HPI: Patient seen and examined at bedside.     ROS:  CONSTITUTIONAL: No weakness, fevers or chills  EYES/ENT: No visual changes;  No vertigo or throat pain   NECK: No pain or stiffness  RESPIRATORY: No cough, wheezing, hemoptysis; No shortness of breath  CARDIOVASCULAR: No chest pain or palpitations  GASTROINTESTINAL: No abdominal or epigastric pain. No nausea, vomiting, or hematemesis; No diarrhea or constipation. No melena or hematochezia.  GENITOURINARY: No dysuria, frequency or hematuria  NEUROLOGICAL: No numbness or weakness  SKIN: No itching, burning, rashes, or lesions     VITAL SIGNS:  Vital Signs Last 24 Hrs  T(C): 36.5 (22 Sep 2023 07:21), Max: 36.6 (22 Sep 2023 04:45)  T(F): 97.7 (22 Sep 2023 07:21), Max: 97.9 (22 Sep 2023 04:45)  HR: 70 (22 Sep 2023 07:21) (52 - 70)  BP: 102/79 (22 Sep 2023 07:21) (102/79 - 141/64)  BP(mean): --  RR: 19 (22 Sep 2023 07:21) (18 - 19)  SpO2: 97% (22 Sep 2023 07:21) (96% - 100%)    Parameters below as of 22 Sep 2023 07:21  Patient On (Oxygen Delivery Method): room air        PHYSICAL EXAM:    General: WDWN  HEENT: NC/AT; PERRL, anicteric sclera; MMM  Neck: supple  Cardiovascular: +S1/S2; RRR  Respiratory: CTA B/L; no W/R/R  Gastrointestinal: soft, NT/ND; +BSx4  Extremities: WWP; no edema, clubbing or cyanosis  Vascular: 2+ radial, DP/PT pulses B/L  Skin: Warm, dry, good turgor, no rashes, or ecchymoses  Neurological: AAOx3; no focal deficits    MEDICATIONS:  MEDICATIONS  (STANDING):  aspirin enteric coated 81 milliGRAM(s) Oral daily  atorvastatin 20 milliGRAM(s) Oral at bedtime  bisacodyl Suppository 10 milliGRAM(s) Rectal once  clopidogrel Tablet 75 milliGRAM(s) Oral daily  dexAMETHasone     Tablet 6 milliGRAM(s) Oral daily  enoxaparin Injectable 30 milliGRAM(s) SubCutaneous every 24 hours  guaiFENesin  milliGRAM(s) Oral every 12 hours  influenza  Vaccine (HIGH DOSE) 0.7 milliLiter(s) IntraMuscular once  insulin lispro (ADMELOG) corrective regimen sliding scale   SubCutaneous Before meals and at bedtime  losartan 100 milliGRAM(s) Oral daily  metoprolol tartrate 12.5 milliGRAM(s) Oral every 12 hours  polyethylene glycol 3350 17 Gram(s) Oral two times a day  remdesivir  IVPB   IV Intermittent   remdesivir  IVPB 100 milliGRAM(s) IV Intermittent every 24 hours  senna 2 Tablet(s) Oral at bedtime    MEDICATIONS  (PRN):  acetaminophen     Tablet .. 650 milliGRAM(s) Oral every 6 hours PRN Temp greater or equal to 38C (100.4F), Mild Pain (1 - 3)  benzocaine/menthol Lozenge 1 Lozenge Oral every 3 hours PRN Sore Throat  benzonatate 100 milliGRAM(s) Oral three times a day PRN Cough  melatonin 3 milliGRAM(s) Oral at bedtime PRN Insomnia  ondansetron Injectable 4 milliGRAM(s) IV Push every 8 hours PRN Nausea and/or Vomiting      ALLERGIES:  Allergies    penicillin (Rash)    Intolerances        LABS:                        11.4   4.79  )-----------( 173      ( 21 Sep 2023 06:50 )             35.7     09-    140  |  105  |  43<H>  ----------------------------<  88  4.2   |  28  |  1.01    Ca    8.6      21 Sep 2023 06:50  Phos  2.5     09-  Mg     2.2     09-    TPro  6.5  /  Alb  2.6<L>  /  TBili  0.5  /  DBili  x   /  AST  65<H>  /  ALT  63<H>  /  AlkPhos  60  -      Urinalysis Basic - ( 21 Sep 2023 10:16 )    Color: Yellow / Appearance: Clear / S.017 / pH: x  Gluc: x / Ketone: Negative mg/dL  / Bili: Negative / Urobili: 0.2 mg/dL   Blood: x / Protein: Negative mg/dL / Nitrite: Negative   Leuk Esterase: Large / RBC: 17 /HPF / WBC 45 /HPF   Sq Epi: x / Non Sq Epi: x / Bacteria: Few /HPF      CAPILLARY BLOOD GLUCOSE      POCT Blood Glucose.: 129 mg/dL (22 Sep 2023 07:55)      RADIOLOGY & ADDITIONAL TESTS: Reviewed. PGY-1 Progress Note discussed with attending    PAGER #: [[phone]] TILL 5:00 PM  PLEASE CONTACT ON CALL TEAM:  - On Call Team (Please refer to Saskia) FROM 5:00 PM - 8:30PM  - Nightfloat Team FROM 8:30 -7:30 AM    CC: Patient is a 89y old  Male who presents with a chief complaint of Hypoxia, SOB (19 Sep 2023 22:45)      OVERNIGHT EVENTS:    SUBJECTIVE / INTERVAL HPI: Patient seen and examined at bedside. Still having hoarse voice and occasional cough. Upset he did not get more cough drops and mucinex yesterday. Says now he has not had a bowel movement in ~1 week. Denies abdominal pain or bloating.     ROS:  CONSTITUTIONAL: No weakness, fevers or chills  EYES/ENT: No visual changes;  No vertigo or throat pain, +hoarseness  NECK: No pain or stiffness  RESPIRATORY: +cough, wheezing, hemoptysis; No shortness of breath  CARDIOVASCULAR: No chest pain or palpitations  GASTROINTESTINAL: No abdominal or epigastric pain. No nausea, vomiting, or hematemesis; No diarrhea or constipation. No melena or hematochezia.  GENITOURINARY: No dysuria, frequency or hematuria  NEUROLOGICAL: No numbness or weakness  SKIN: No itching, burning, rashes, or lesions     VITAL SIGNS:  Vital Signs Last 24 Hrs  T(C): 36.5 (22 Sep 2023 07:21), Max: 36.6 (22 Sep 2023 04:45)  T(F): 97.7 (22 Sep 2023 07:21), Max: 97.9 (22 Sep 2023 04:45)  HR: 70 (22 Sep 2023 07:21) (52 - 70)  BP: 102/79 (22 Sep 2023 07:21) (102/79 - 141/64)  BP(mean): --  RR: 19 (22 Sep 2023 07:21) (18 - 19)  SpO2: 97% (22 Sep 2023 07:21) (96% - 100%)    Parameters below as of 22 Sep 2023 07:21  Patient On (Oxygen Delivery Method): room air        PHYSICAL EXAM:    General: underweight, elderly, NAD  HEENT: hard of hearing, NC/AT; PERRL, anicteric sclera; MMM  Neck: supple  Cardiovascular: +S1/S2; loud systolic murmur at LUSB radiating to R carotid  Respiratory: CTA B/L; no W/R/R  Gastrointestinal: soft, NT/ND; +BSx4  Extremities: WWP; no edema, clubbing or cyanosis  Vascular: 2+ radial, DP/PT pulses B/L  Skin: Warm, dry, reduced turgor, no rashes, or ecchymoses  Neurological: AAOx3; no focal deficits    MEDICATIONS:  MEDICATIONS  (STANDING):  aspirin enteric coated 81 milliGRAM(s) Oral daily  atorvastatin 20 milliGRAM(s) Oral at bedtime  bisacodyl Suppository 10 milliGRAM(s) Rectal once  clopidogrel Tablet 75 milliGRAM(s) Oral daily  dexAMETHasone     Tablet 6 milliGRAM(s) Oral daily  enoxaparin Injectable 30 milliGRAM(s) SubCutaneous every 24 hours  guaiFENesin  milliGRAM(s) Oral every 12 hours  influenza  Vaccine (HIGH DOSE) 0.7 milliLiter(s) IntraMuscular once  insulin lispro (ADMELOG) corrective regimen sliding scale   SubCutaneous Before meals and at bedtime  losartan 100 milliGRAM(s) Oral daily  metoprolol tartrate 12.5 milliGRAM(s) Oral every 12 hours  polyethylene glycol 3350 17 Gram(s) Oral two times a day  remdesivir  IVPB   IV Intermittent   remdesivir  IVPB 100 milliGRAM(s) IV Intermittent every 24 hours  senna 2 Tablet(s) Oral at bedtime    MEDICATIONS  (PRN):  acetaminophen     Tablet .. 650 milliGRAM(s) Oral every 6 hours PRN Temp greater or equal to 38C (100.4F), Mild Pain (1 - 3)  benzocaine/menthol Lozenge 1 Lozenge Oral every 3 hours PRN Sore Throat  benzonatate 100 milliGRAM(s) Oral three times a day PRN Cough  melatonin 3 milliGRAM(s) Oral at bedtime PRN Insomnia  ondansetron Injectable 4 milliGRAM(s) IV Push every 8 hours PRN Nausea and/or Vomiting      ALLERGIES:  Allergies    penicillin (Rash)    Intolerances        LABS:                        11.4   4.79  )-----------( 173      ( 21 Sep 2023 06:50 )             35.7     09-21    140  |  105  |  43<H>  ----------------------------<  88  4.2   |  28  |  1.01    Ca    8.6      21 Sep 2023 06:50  Phos  2.5       Mg     2.2         TPro  6.5  /  Alb  2.6<L>  /  TBili  0.5  /  DBili  x   /  AST  65<H>  /  ALT  63<H>  /  AlkPhos  60        Urinalysis Basic - ( 21 Sep 2023 10:16 )    Color: Yellow / Appearance: Clear / S.017 / pH: x  Gluc: x / Ketone: Negative mg/dL  / Bili: Negative / Urobili: 0.2 mg/dL   Blood: x / Protein: Negative mg/dL / Nitrite: Negative   Leuk Esterase: Large / RBC: 17 /HPF / WBC 45 /HPF   Sq Epi: x / Non Sq Epi: x / Bacteria: Few /HPF      CAPILLARY BLOOD GLUCOSE      POCT Blood Glucose.: 129 mg/dL (22 Sep 2023 07:55)      RADIOLOGY & ADDITIONAL TESTS: Reviewed.

## 2023-09-22 NOTE — DISCHARGE NOTE PROVIDER - CARE PROVIDER_API CALL
Kurt Huerta  Pulmonary Disease  [address], [address]  [place] [address]  Phone: [phone]  Fax: [phone]  Established Patient  Follow Up Time: 1 week

## 2023-09-22 NOTE — PHYSICAL THERAPY INITIAL EVALUATION ADULT - ADDITIONAL COMMENTS
-- pt reports 3 fall in past 6 months  -- pt reports increasing difficulty washing up and opening his food

## 2023-09-22 NOTE — DISCHARGE NOTE PROVIDER - NSDCMRMEDTOKEN_GEN_ALL_CORE_FT
Actos 15 mg oral tablet: 1 tab(s) orally once a day  Aspir 81 oral delayed release tablet: 1 tab(s) orally once a day  Januvia 100 mg oral tablet: 1 tab(s) orally once a day  Lipitor 20 mg oral tablet: 1 tab(s) orally once a day  Metoprolol Succinate ER 50 mg oral tablet, extended release: 1 tab(s) orally once a day  Micardis 80 mg oral tablet: 1 tab(s) orally once a day  Plavix 75 mg oral tablet: 1 tab(s) orally once a day   Actos 15 mg oral tablet: 1 tab(s) orally once a day  Aspir 81 oral delayed release tablet: 1 tab(s) orally once a day  benzonatate 100 mg oral capsule: 1 cap(s) orally 3 times a day As needed Cough  Chloraseptic 6 mg-10 mg mucous membrane lozenge: 1 lozenge mucous membrane every 4 hours as needed for  cough  Januvia 100 mg oral tablet: 1 tab(s) orally once a day  Lipitor 20 mg oral tablet: 1 tab(s) orally once a day  metoprolol succinate 25 mg oral tablet, extended release: 1 tab(s) orally once a day  Micardis 80 mg oral tablet: 1 tab(s) orally once a day  Plavix 75 mg oral tablet: 1 tab(s) orally once a day  polyethylene glycol 3350 oral powder for reconstitution: 17 gram(s) orally 2 times a day  senna leaf extract oral tablet: 2 tab(s) orally once a day (at bedtime)

## 2023-09-22 NOTE — DISCHARGE NOTE PROVIDER - NSDCCPCAREPLAN_GEN_ALL_CORE_FT
PRINCIPAL DISCHARGE DIAGNOSIS  Diagnosis: Acute hypoxemic respiratory failure due to COVID-19  Assessment and Plan of Treatment:       SECONDARY DISCHARGE DIAGNOSES  Diagnosis: THOMAS (acute kidney injury)  Assessment and Plan of Treatment:     Diagnosis: CAD (coronary artery disease)  Assessment and Plan of Treatment:     Diagnosis: HTN (hypertension)  Assessment and Plan of Treatment:     Diagnosis: Diabetes mellitus  Assessment and Plan of Treatment:      PRINCIPAL DISCHARGE DIAGNOSIS  Diagnosis: Acute hypoxemic respiratory failure due to COVID-19  Assessment and Plan of Treatment: You were diagnosed with Acute hypoxemic respiratory failure due to COVID-19 infection. COVID19 PCR was positive. Chest X-ray was normal. You were admitted to the hospital as you were requiring oxygen supplementation to maintain oxygen level above 92%. You were treated with the steroid Decadron and anti-viral Remdesivir IV. You were able to be taken off supplemental oxygen quickly and your symptoms improved with treatment. You were provided with supportive care as needed to control your cough and hoarseness including the cough suppresant BENZONETATE and BENZOCAINE LOZENGES (Brand: Chloraseptic). You were evaluated by physical therapy who recommended rehab for strength and balance training. Please continue to take your medications as PRESCRIBED and follow up with your primary doctor in a week from discharge to adjust medications as needed.        SECONDARY DISCHARGE DIAGNOSES  Diagnosis: THOMAS (acute kidney injury)  Assessment and Plan of Treatment: You were diagnosed with acute kidney. THOMAS (acute kidney injury) is a condition in which the kidneys suddenly can't filter waste from the blood. Acute renal failure develops rapidly over a few hours or daysl. It's most common in those who are critically ill and already hospitalized. Symptoms include decreased urinary output, swelling due to fluid retention, nausea, fatigue, and shortness of breath. Sometimes symptoms may be subtle or may not appear at all. In addition to addressing the underlying cause, treatments include fluids, medication, and dialysis. A marker of your kidney function is your serum creatinine which elevates when the kidney gets injured. Your Creatinine was elevated and then improved down to baseline. You were treated with IV fluids until it normalized. Your THOMAS was likely due to dehydration. Ensure you remain well hydrated and please follow up with your primary doctor in a week from discharge to adjust medications as needed    Diagnosis: CAD (coronary artery disease)  Assessment and Plan of Treatment: You have history of coronary artery disease which is a narrowing of the arteries of your heart caused by a buildup of plaque made of cholesterol. The narrowing decreased the amount of blood flow to your heart causing chest pain, shortness of breath, sweating.   You are taking aspirin, Metoprolol, Plavix, and statin as home medications. Your metoprolol dose was lowered due to low heart rate and your blood pressure was well controlled. Please continue to take your medications as prescribed. Please follow up with your primary doctor in a week from discharge to adjust medications as needed      Diagnosis: HTN (hypertension)  Assessment and Plan of Treatment: You have a history of Hypertension.   On this admission, your Blood Pressure was adequately controlled with your home medications. Your METOPROLOL DOSE WAS LOWERED TO 12.5MG TWICE A DAY due to low heart rate and your blood pressure remained stable.  Your blood pressure target is 120-140/80-90. To care for your blood pressure at home maintain a healthy lifestyle, eat a low salt diet, avoid fatty food, try to lose weight, and exercise regularly or stay active as tolerated 30 mins X 3 times per week.  Notify your doctor if you have any of the following symptoms:   (Dizziness, Lightheadedness, Blurry vision, Headache, Chest pain, Shortness of breath.)  Please continue taking your home medications and follow-up with your PCP in 1 week from discharge to adjust medications as needed.      Diagnosis: Diabetes mellitus  Assessment and Plan of Treatment: You have a history of diabetes.   Your HbA1c was 5.8 during this admission.  You need to continue monitoring your blood sugar levels closely and maintain healthy lifestyle by eating healthy diabetic regimen, weight loss and exercise regularly as tolerated.  Please continue to take your home medications as prescribed.   Please follow up with your PCP/Endocrinologist within a week of discharge.

## 2023-09-22 NOTE — PROGRESS NOTE ADULT - PROBLEM SELECTOR PLAN 1
P/w cough, and weakness.   Likely 2/2 to COVID.  Hypoxic to 90% on room air.   Weaned off O2 supplementation.   c/w decadron.   initially holding remdesevir due to THOMAS which resolved.  started on remdesivir   Tessalon perle.   Tylenol.   Supportive measures.   Incentive spirometer.
P/w cough, and weakness.   Likely 2/2 to COVID.  Hypoxic to 90% on room air.   O2 supplementation. Titrate off as tolerated.   Continue decadron.   THOMAS improved, can start remdesivir  Tessalon perles.   Tylenol.   Supportive measure.   Incentive spirometer.

## 2023-09-22 NOTE — PROGRESS NOTE ADULT - PROBLEM SELECTOR PLAN 4
See above. RESOLVED  P/w Cr of 1.38 (baseline 1.0)  Likely prerenal in setting of decreased oral intake.   Avoid nephrotoxic agents.   Trend srCr 1.38>1.23>1.18

## 2023-09-22 NOTE — PHYSICAL THERAPY INITIAL EVALUATION ADULT - GENERAL OBSERVATIONS, REHAB EVAL
Consult received, chart reviewed. Patient received seated EOB, +tele NAD. Patient agreed to EVALUATION from Physical Therapist.

## 2023-09-22 NOTE — DIETITIAN INITIAL EVALUATION ADULT - PERTINENT LABORATORY DATA
09-21    140  |  105  |  43<H>  ----------------------------<  88  4.2   |  28  |  1.01    Ca    8.6      21 Sep 2023 06:50  Phos  2.5     09-21  Mg     2.2     09-21    TPro  6.5  /  Alb  2.6<L>  /  TBili  0.5  /  DBili  x   /  AST  65<H>  /  ALT  63<H>  /  AlkPhos  60  09-21  POCT Blood Glucose.: 231 mg/dL (09-22-23 @ 11:11)  A1C with Estimated Average Glucose Result: 5.8 % (09-19-23 @ 05:10)

## 2023-09-22 NOTE — PHYSICAL THERAPY INITIAL EVALUATION ADULT - PHYSICAL ASSIST/NONPHYSICAL ASSIST: SIT/STAND, REHAB EVAL
PRINCIPAL DISCHARGE DIAGNOSIS  Diagnosis: Urinary tract infection  Assessment and Plan of Treatment:       SECONDARY DISCHARGE DIAGNOSES  Diagnosis: Acute kidney injury superimposed on CKD  Assessment and Plan of Treatment:     Diagnosis: Ureteral stone with hydronephrosis  Assessment and Plan of Treatment:     Diagnosis: Type 2 diabetes mellitus with hyperglycemia, with long-term current use of insulin  Assessment and Plan of Treatment: Type 2 diabetes mellitus with hyperglycemia, with long-term current use of insulin     PRINCIPAL DISCHARGE DIAGNOSIS  Diagnosis: Urinary tract infection  Assessment and Plan of Treatment: You were diagnosed with a fungal urinary tract infection for which you are being treated with fluconazole. Please complete your course of antifungal medication fluconazole as directed and follow up with your PCP within approximately 1 week of discharge.      SECONDARY DISCHARGE DIAGNOSES  Diagnosis: Acute kidney injury superimposed on CKD  Assessment and Plan of Treatment: You were found to have newly worsened kidney function beyond your baseline decreased kidney function. Your kidney function improved with IV hydration, IV antibiotics, and placement of a right ureter stent for obstructive ureteral stones. Please follow up with your PCP within approximately 1 week of discharge for reassessment and continued management of your kidney disease.    Diagnosis: Ureteral stone with hydronephrosis  Assessment and Plan of Treatment: CT scan of your abdomen showed obstructive stones in your right ureter for which you underwent successful placement of a right ureter stent on 12/24/20 by Dr. Brian Enriquez. Please follow up with the urology clinic for reassessment. You have a urology appointment scheduled with Dr. Brian Enriquez on 1/14/21 at 11AM at [address] [place] [address].    Diagnosis: Hypoxemia  Assessment and Plan of Treatment: You were found to have new intermittent decrease in your oxygen levels to the mid to upper 80s with exertion. You were evaluated by the pulmonology team and evaluated as likely having multiple causes including a component of fluid in your lungs (which can sometimes build up from kidney disease) and having parts of your lungs that are not being fully used due to prolonged immobility in the hospital along with obesity. You were treated with removal of fluid temporary diuretic medications and with breathing exercises to help open up and recruit more of your lung tissue. Continue using your incentive spirometer 10 times an hour as well as deep breathing exercises as demonstrated in the hospital. Your oxygen levels increased to the mid to upper 90s with the aforementioned breathing measures alone. Please continue to participate in your physical therapy program as directed. You may temporarily require supplemental oxygen during your stay at rehab. Please follow up with your PCP within 1 week of discharge for reassessment.    Diagnosis: Type 2 diabetes mellitus with hyperglycemia, with long-term current use of insulin  Assessment and Plan of Treatment: You were evaluated by the endocrinology team for uncontrolled diabetes. You were recommended a medication regimen of ---- for discharge. Please follow up with your PCP within 1 week of discharge for reassessment and continued management of your diabetes. You may call [phone] to establish care with the [facility] endocrinology office in [place].     PRINCIPAL DISCHARGE DIAGNOSIS  Diagnosis: Urinary tract infection  Assessment and Plan of Treatment: You were diagnosed with a fungal urinary tract infection for which you are being treated with fluconazole. Please complete your course of antifungal medication fluconazole as directed and follow up with your PCP within approximately 1 week of discharge.      SECONDARY DISCHARGE DIAGNOSES  Diagnosis: Acute kidney injury superimposed on CKD  Assessment and Plan of Treatment: You were found to have newly worsened kidney function beyond your baseline decreased kidney function. Your kidney function improved with IV hydration, IV antibiotics, and placement of a right ureter stent for obstructive ureteral stones. Please follow up with your PCP within approximately 1 week of discharge for reassessment and continued management of your kidney disease.    Diagnosis: Ureteral stone with hydronephrosis  Assessment and Plan of Treatment: CT scan of your abdomen showed obstructive stones in your right ureter for which you underwent successful placement of a right ureter stent on 12/24/20 by Dr. Brian Enriquez. Please follow up with the urology clinic for reassessment. You have a urology appointment scheduled with Dr. Brian Enriquez on 1/14/21 at 11AM at [address] [place] [address].    Diagnosis: Hypoxemia  Assessment and Plan of Treatment: You were found to have new intermittent decrease in your oxygen levels to the mid to upper 80s with exertion. You were evaluated by the pulmonology team and evaluated as likely having multiple causes including a component of fluid in your lungs (which can sometimes build up from kidney disease) and having parts of your lungs that are not being fully used due to prolonged immobility in the hospital along with obesity. You were treated with removal of fluid temporary diuretic medications and with breathing exercises to help open up and recruit more of your lung tissue. Continue using your incentive spirometer 10 times an hour as well as deep breathing exercises as demonstrated in the hospital. Your oxygen levels increased to the mid to upper 90s with the aforementioned breathing measures alone. Please continue to participate in your physical therapy program as directed. You may temporarily require supplemental oxygen during your stay at rehab. Please follow up with your PCP within 1 week of discharge for reassessment.    Diagnosis: Type 2 diabetes mellitus with hyperglycemia, with long-term current use of insulin  Assessment and Plan of Treatment: You were evaluated by the endocrinology team for uncontrolled diabetes. Please continue taking your insulin as directed. Please follow up with your PCP within 1 week of discharge for reassessment and continued management of your diabetes. If your blood glucose remains inadequately controlled on insulin, you may benefit from a trial of adding linagliptin (trajenta) 5mg daily to your diabetes medication regimen which you can discuss with your primary care physicison. You may call [phone] to establish care with the [facility] endocrinology office in [place] for continued endocrinology follow up.     1 person assist

## 2023-09-22 NOTE — PHYSICAL THERAPY INITIAL EVALUATION ADULT - DIAGNOSIS, PT EVAL
(ICF Model) Pt. present w/deficits in Body Structures/Function (Impairments), incl: Strength, Balance, leading to deficits in performing the below noted Activities (Limitations).

## 2023-09-22 NOTE — PROGRESS NOTE ADULT - PROBLEM SELECTOR PLAN 2
found to have HR as low as 46bpm  -f/u EKG  -loud systolic murmur on exam radiating to carotids concerning for AS  -f/u TTE  -monitor HR  -will consider adjusting metoprolol and telemetry no BMs since admission with patient reporting no BMs in ~1 week  -c/w miralax and senna  -refused dulcolax suppository  -give PO dulcolax  -will consider enema vs lactulose

## 2023-09-22 NOTE — PROGRESS NOTE ADULT - ASSESSMENT
Patient is a 89 y/o M who lives alone and with a significant PMHx of CAD with stents, DM and a significant PSHx of appendectomy who presented with 3 day history of severe cough and weakness. Patient was found to have COVID-19. [facility] 
Patient is a 89 y/o M who lives alone and with a significant PMHx of CAD with stents, DM and a significant PSHx of appendectomy who presented with 3 day history of severe cough and weakness. Patient was found to have COVID-19. [facility] 
Patient is a 89 y/o M who lives alone and with a significant PMHx of CAD with stents, DM and a significant PSHx of appendectomy who presented with 3 day history of severe cough and weakness. Patient was found to have COVID-19. [facility] 
 89 yo M who lives alone and with of CAD with stents, DM2  who presented with 3 day history of severe cough and weakness. Patient was found to have COVID-19. Patient is being admitted for AHRF 2/2 to COVID.

## 2023-09-22 NOTE — PHYSICAL THERAPY INITIAL EVALUATION ADULT - GAIT DISTANCE, PT EVAL
15ft in room (in isolation) with enocuragement to do more, but pt becoming frustrated and went ot sit

## 2023-09-22 NOTE — PROGRESS NOTE ADULT - PROBLEM SELECTOR PLAN 6
on telmisartan 80mg qd  -continue as losartan 100mg qd  - continue home metoprolol ER 50mg qd C/w home plavix, aspirin, metoprolol and atorvastatin.

## 2023-09-22 NOTE — DIETITIAN INITIAL EVALUATION ADULT - NS FNS DIET ORDER
Diet, DASH/TLC:   Sodium & Cholesterol Restricted  Consistent Carbohydrate {No Snacks}  Kosher (09-21-23 @ 19:26)

## 2023-09-22 NOTE — PROGRESS NOTE ADULT - ATTENDING COMMENTS
89 yo M who lives alone and with of CAD with stents, DM2  who presented with 3 day history of severe cough and weakness. Patient was found to have COVID-19. Patient is being admitted for AHRF 2/2 to [facility].    Patient was seen and examined, still w/ some sob.     lab holiday     PE as above     A/P:  #Acute Hypoxic respiratory failure - resolved   #Covid pneumonia   #Sinus bradycardia   #CAD w/ stents   #Elevated Troponin- suspect demand ischemia   #VANDANA   #DM  #THOMAS  #dvt ppx     Plan;  -C/w decadron-D4, remdesevir- D3 , monitor O2 sats   -c/w asa, Plavix   -Pt noted w/ elevated troponin, no cp or sob. Likely type 2 MI- demand ischemia from hypoxia   -ECHO as out-patient for VANDANA   -Scr improved   -HR stable off metoprolol.   -PT- [facility], pending choices   -Medically stable for DC, pending [facility] acceptance. CM to follow 89 yo M who lives alone and with of CAD with stents, DM2  who presented with 3 day history of severe cough and weakness. Patient was found to have COVID-19. Patient is being admitted for AHRF 2/2 to [facility].    Patient was seen and examined, feels well.     lab holiday     PE as above     A/P:  #Acute Hypoxic respiratory failure - resolved   #Covid pneumonia   #Sinus bradycardia   #CAD w/ stents   #Elevated Troponin- suspect demand ischemia   #VANDANA   #DM  #THOMAS  #dvt ppx     Plan;  -C/w decadron-D4, remdesevir- D3 , monitor O2 sats   -c/w asa, Plavix   -Pt noted w/ elevated troponin, no cp or sob. Likely type 2 MI- demand ischemia from hypoxia   -ECHO as out-patient for VANDANA   -Scr improved   -HR stable off metoprolol.   -PT- [facility], pending choices   -Medically stable for DC, pending [facility] acceptance. CM to follow

## 2023-09-22 NOTE — DIETITIAN INITIAL EVALUATION ADULT - PERTINENT MEDS FT
MEDICATIONS  (STANDING):  aspirin enteric coated 81 milliGRAM(s) Oral daily  atorvastatin 20 milliGRAM(s) Oral at bedtime  clopidogrel Tablet 75 milliGRAM(s) Oral daily  dexAMETHasone     Tablet 6 milliGRAM(s) Oral daily  enoxaparin Injectable 30 milliGRAM(s) SubCutaneous every 24 hours  guaiFENesin  milliGRAM(s) Oral every 12 hours  influenza  Vaccine (HIGH DOSE) 0.7 milliLiter(s) IntraMuscular once  insulin lispro (ADMELOG) corrective regimen sliding scale   SubCutaneous Before meals and at bedtime  losartan 100 milliGRAM(s) Oral daily  metoprolol tartrate 12.5 milliGRAM(s) Oral every 12 hours  polyethylene glycol 3350 17 Gram(s) Oral two times a day  remdesivir  IVPB   IV Intermittent   remdesivir  IVPB 100 milliGRAM(s) IV Intermittent every 24 hours  senna 2 Tablet(s) Oral at bedtime    MEDICATIONS  (PRN):  acetaminophen     Tablet .. 650 milliGRAM(s) Oral every 6 hours PRN Temp greater or equal to 38C (100.4F), Mild Pain (1 - 3)  benzocaine/menthol Lozenge 1 Lozenge Oral every 3 hours PRN Sore Throat  benzonatate 100 milliGRAM(s) Oral three times a day PRN Cough  bisacodyl 5 milliGRAM(s) Oral every 12 hours PRN Constipation  melatonin 3 milliGRAM(s) Oral at bedtime PRN Insomnia  ondansetron Injectable 4 milliGRAM(s) IV Push every 8 hours PRN Nausea and/or Vomiting

## 2023-09-22 NOTE — DISCHARGE NOTE PROVIDER - NSDCCAREPROVSEEN_GEN_ALL_CORE_FT
Caesar, Jp Mckeon, Radha Soriano, Tamiko Ortiz, Pascual Anguiano, Kenroy Shah, Alvarado Eduardo, Pawel Manriquez

## 2023-09-22 NOTE — DISCHARGE NOTE NURSING/CASE MANAGEMENT/SOCIAL WORK - PATIENT PORTAL LINK FT
You can access the FollowMyHealth Patient Portal offered by [facility] by registering at the following website: [email or web address]. By joining [employer]’s FollowMyHealth portal, you will also be able to view your health information using other applications (apps) compatible with our system.

## 2023-09-23 VITALS
TEMPERATURE: 97 F | DIASTOLIC BLOOD PRESSURE: 72 MMHG | OXYGEN SATURATION: 97 % | SYSTOLIC BLOOD PRESSURE: 107 MMHG | HEART RATE: 67 BPM | RESPIRATION RATE: 18 BRPM

## 2023-09-23 LAB
GLUCOSE BLDC GLUCOMTR-MCNC: 114 MG/DL — HIGH (ref 70–99)
GLUCOSE BLDC GLUCOMTR-MCNC: 186 MG/DL — HIGH (ref 70–99)

## 2023-09-23 PROCEDURE — 87040 BLOOD CULTURE FOR BACTERIA: CPT

## 2023-09-23 PROCEDURE — 82962 GLUCOSE BLOOD TEST: CPT

## 2023-09-23 PROCEDURE — 87086 URINE CULTURE/COLONY COUNT: CPT

## 2023-09-23 PROCEDURE — 97162 PT EVAL MOD COMPLEX 30 MIN: CPT

## 2023-09-23 PROCEDURE — 83036 HEMOGLOBIN GLYCOSYLATED A1C: CPT

## 2023-09-23 PROCEDURE — 87637 SARSCOV2&INF A&B&RSV AMP PRB: CPT

## 2023-09-23 PROCEDURE — 99239 HOSP IP/OBS DSCHRG MGMT >30: CPT

## 2023-09-23 PROCEDURE — 83605 ASSAY OF LACTIC ACID: CPT

## 2023-09-23 PROCEDURE — 84443 ASSAY THYROID STIM HORMONE: CPT

## 2023-09-23 PROCEDURE — 84100 ASSAY OF PHOSPHORUS: CPT

## 2023-09-23 PROCEDURE — 84484 ASSAY OF TROPONIN QUANT: CPT

## 2023-09-23 PROCEDURE — 85379 FIBRIN DEGRADATION QUANT: CPT

## 2023-09-23 PROCEDURE — 83880 ASSAY OF NATRIURETIC PEPTIDE: CPT

## 2023-09-23 PROCEDURE — 80048 BASIC METABOLIC PNL TOTAL CA: CPT

## 2023-09-23 PROCEDURE — 99285 EMERGENCY DEPT VISIT HI MDM: CPT

## 2023-09-23 PROCEDURE — 71045 X-RAY EXAM CHEST 1 VIEW: CPT

## 2023-09-23 PROCEDURE — 36415 COLL VENOUS BLD VENIPUNCTURE: CPT

## 2023-09-23 PROCEDURE — 83690 ASSAY OF LIPASE: CPT

## 2023-09-23 PROCEDURE — 80053 COMPREHEN METABOLIC PANEL: CPT

## 2023-09-23 PROCEDURE — 85027 COMPLETE CBC AUTOMATED: CPT

## 2023-09-23 PROCEDURE — 83735 ASSAY OF MAGNESIUM: CPT

## 2023-09-23 PROCEDURE — 93005 ELECTROCARDIOGRAM TRACING: CPT

## 2023-09-23 PROCEDURE — 85025 COMPLETE CBC W/AUTO DIFF WBC: CPT

## 2023-09-23 PROCEDURE — 81001 URINALYSIS AUTO W/SCOPE: CPT

## 2023-09-23 RX ORDER — BENZOCAINE AND MENTHOL 5; 1 G/100ML; G/100ML
1 LIQUID ORAL
Qty: 1 | Refills: 0
Start: 2023-09-23 | End: 2023-09-29

## 2023-09-23 RX ORDER — METOPROLOL TARTRATE 50 MG
1 TABLET ORAL
Qty: 0 | Refills: 0 | DISCHARGE

## 2023-09-23 RX ORDER — METOPROLOL TARTRATE 50 MG
1 TABLET ORAL
Qty: 30 | Refills: 0
Start: 2023-09-23 | End: 2023-10-22

## 2023-09-23 RX ORDER — SENNA PLUS 8.6 MG/1
2 TABLET ORAL
Qty: 0 | Refills: 0 | DISCHARGE
Start: 2023-09-23

## 2023-09-23 RX ORDER — POLYETHYLENE GLYCOL 3350 17 G/17G
17 POWDER, FOR SOLUTION ORAL
Qty: 0 | Refills: 0 | DISCHARGE
Start: 2023-09-23

## 2023-09-23 RX ADMIN — LOSARTAN POTASSIUM 100 MILLIGRAM(S): 100 TABLET, FILM COATED ORAL at 05:23

## 2023-09-23 RX ADMIN — Medication 6 MILLIGRAM(S): at 05:23

## 2023-09-23 RX ADMIN — Medication 600 MILLIGRAM(S): at 05:23

## 2023-09-23 RX ADMIN — Medication 100 MILLIGRAM(S): at 11:57

## 2023-09-23 RX ADMIN — BENZOCAINE AND MENTHOL 1 LOZENGE: 5; 1 LIQUID ORAL at 05:29

## 2023-09-23 RX ADMIN — Medication 1: at 12:01

## 2023-09-23 RX ADMIN — Medication 81 MILLIGRAM(S): at 11:57

## 2023-09-23 RX ADMIN — REMDESIVIR 200 MILLIGRAM(S): 5 INJECTION INTRAVENOUS at 03:53

## 2023-09-23 RX ADMIN — POLYETHYLENE GLYCOL 3350 17 GRAM(S): 17 POWDER, FOR SOLUTION ORAL at 05:23

## 2023-09-23 RX ADMIN — CLOPIDOGREL BISULFATE 75 MILLIGRAM(S): 75 TABLET, FILM COATED ORAL at 11:57

## 2023-09-24 LAB
CULTURE RESULTS: SIGNIFICANT CHANGE UP
CULTURE RESULTS: SIGNIFICANT CHANGE UP
SPECIMEN SOURCE: SIGNIFICANT CHANGE UP
SPECIMEN SOURCE: SIGNIFICANT CHANGE UP

## 2023-10-02 ENCOUNTER — INPATIENT (INPATIENT)
Facility: HOSPITAL | Age: 88
LOS: 1 days | Discharge: HOME | DRG: 683 | End: 2023-10-04
Attending: INTERNAL MEDICINE | Admitting: INTERNAL MEDICINE
Payer: MEDICARE

## 2023-10-02 VITALS
HEART RATE: 76 BPM | DIASTOLIC BLOOD PRESSURE: 59 MMHG | SYSTOLIC BLOOD PRESSURE: 94 MMHG | RESPIRATION RATE: 18 BRPM | TEMPERATURE: 98 F | OXYGEN SATURATION: 95 % | HEIGHT: 60 IN

## 2023-10-02 DIAGNOSIS — R45.1 RESTLESSNESS AND AGITATION: ICD-10-CM

## 2023-10-02 DIAGNOSIS — Z90.49 ACQUIRED ABSENCE OF OTHER SPECIFIED PARTS OF DIGESTIVE TRACT: Chronic | ICD-10-CM

## 2023-10-02 LAB
ALBUMIN SERPL ELPH-MCNC: 3 G/DL — LOW (ref 3.5–5)
ALP SERPL-CCNC: 65 U/L — SIGNIFICANT CHANGE UP (ref 40–120)
ALT FLD-CCNC: 24 U/L DA — SIGNIFICANT CHANGE UP (ref 10–60)
ANION GAP SERPL CALC-SCNC: 4 MMOL/L — LOW (ref 5–17)
APAP SERPL-MCNC: <2 UG/ML — LOW (ref 10–30)
APTT BLD: 34.2 SEC — SIGNIFICANT CHANGE UP (ref 24.5–35.6)
AST SERPL-CCNC: 17 U/L — SIGNIFICANT CHANGE UP (ref 10–40)
BASOPHILS # BLD AUTO: 0 K/UL — SIGNIFICANT CHANGE UP (ref 0–0.2)
BASOPHILS NFR BLD AUTO: 0 % — SIGNIFICANT CHANGE UP (ref 0–2)
BILIRUB SERPL-MCNC: 0.8 MG/DL — SIGNIFICANT CHANGE UP (ref 0.2–1.2)
BUN SERPL-MCNC: 56 MG/DL — HIGH (ref 7–18)
CALCIUM SERPL-MCNC: 8.6 MG/DL — SIGNIFICANT CHANGE UP (ref 8.4–10.5)
CHLORIDE SERPL-SCNC: 103 MMOL/L — SIGNIFICANT CHANGE UP (ref 96–108)
CO2 SERPL-SCNC: 26 MMOL/L — SIGNIFICANT CHANGE UP (ref 22–31)
CREAT SERPL-MCNC: 1.33 MG/DL — HIGH (ref 0.5–1.3)
EGFR: 49 ML/MIN/1.73M2 — LOW
EOSINOPHIL # BLD AUTO: 0.05 K/UL — SIGNIFICANT CHANGE UP (ref 0–0.5)
EOSINOPHIL NFR BLD AUTO: 0.6 % — SIGNIFICANT CHANGE UP (ref 0–6)
ETHANOL SERPL-MCNC: <3 MG/DL — SIGNIFICANT CHANGE UP (ref 0–10)
GLUCOSE SERPL-MCNC: 112 MG/DL — HIGH (ref 70–99)
HCT VFR BLD CALC: 33 % — LOW (ref 39–50)
HGB BLD-MCNC: 10.6 G/DL — LOW (ref 13–17)
HIV 1 & 2 AB SERPL IA.RAPID: SIGNIFICANT CHANGE UP
IMM GRANULOCYTES NFR BLD AUTO: 0.5 % — SIGNIFICANT CHANGE UP (ref 0–0.9)
INR BLD: 0.94 RATIO — SIGNIFICANT CHANGE UP (ref 0.85–1.18)
LYMPHOCYTES # BLD AUTO: 1.26 K/UL — SIGNIFICANT CHANGE UP (ref 1–3.3)
LYMPHOCYTES # BLD AUTO: 14.2 % — SIGNIFICANT CHANGE UP (ref 13–44)
MCHC RBC-ENTMCNC: 28.9 PG — SIGNIFICANT CHANGE UP (ref 27–34)
MCHC RBC-ENTMCNC: 32.1 GM/DL — SIGNIFICANT CHANGE UP (ref 32–36)
MCV RBC AUTO: 89.9 FL — SIGNIFICANT CHANGE UP (ref 80–100)
MONOCYTES # BLD AUTO: 0.91 K/UL — HIGH (ref 0–0.9)
MONOCYTES NFR BLD AUTO: 10.3 % — SIGNIFICANT CHANGE UP (ref 2–14)
NEUTROPHILS # BLD AUTO: 6.59 K/UL — SIGNIFICANT CHANGE UP (ref 1.8–7.4)
NEUTROPHILS NFR BLD AUTO: 74.4 % — SIGNIFICANT CHANGE UP (ref 43–77)
NRBC # BLD: 0 /100 WBCS — SIGNIFICANT CHANGE UP (ref 0–0)
PLATELET # BLD AUTO: 121 K/UL — LOW (ref 150–400)
POTASSIUM SERPL-MCNC: 5 MMOL/L — SIGNIFICANT CHANGE UP (ref 3.5–5.3)
POTASSIUM SERPL-SCNC: 5 MMOL/L — SIGNIFICANT CHANGE UP (ref 3.5–5.3)
PROT SERPL-MCNC: 6.6 G/DL — SIGNIFICANT CHANGE UP (ref 6–8.3)
PROTHROM AB SERPL-ACNC: 10.7 SEC — SIGNIFICANT CHANGE UP (ref 9.5–13)
RBC # BLD: 3.67 M/UL — LOW (ref 4.2–5.8)
RBC # FLD: 16.4 % — HIGH (ref 10.3–14.5)
SALICYLATES SERPL-MCNC: <1.7 MG/DL — LOW (ref 2.8–20)
SODIUM SERPL-SCNC: 133 MMOL/L — LOW (ref 135–145)
TSH SERPL-MCNC: 3.67 UU/ML — SIGNIFICANT CHANGE UP (ref 0.34–4.82)
WBC # BLD: 8.85 K/UL — SIGNIFICANT CHANGE UP (ref 3.8–10.5)
WBC # FLD AUTO: 8.85 K/UL — SIGNIFICANT CHANGE UP (ref 3.8–10.5)

## 2023-10-02 PROCEDURE — 93010 ELECTROCARDIOGRAM REPORT: CPT

## 2023-10-02 PROCEDURE — 99285 EMERGENCY DEPT VISIT HI MDM: CPT

## 2023-10-02 NOTE — ED ADULT NURSE NOTE - NSFALLHARMRISKINTERV_ED_ALL_ED

## 2023-10-02 NOTE — H&P ADULT - PROBLEM SELECTOR PLAN 2
BUN 56/SCr 1.33  baseline on last admission approx 43/1.1  making urine    -likely ISO PO intake  -NS at 75 cc/hr x 12 hrs

## 2023-10-02 NOTE — ED PROVIDER NOTE - OBJECTIVE STATEMENT
89 y/o man, h/o HTN, CAD, sent from [facility] due to Pt becoming agitated and then stating suicidal ideation; 89 y/o man, h/o HTN, CAD, sent from [facility] due to Pt becoming agitated and then stating suicidal ideation; he was reportedly a safety risk and refusing treatment, unable to be verbally de-escalated.  In the ED, Pt clearly denies suicidal ideation.  He denies fever/CP/SOB, denies any other symptoms.  He says that he was initially admitted to the rehab facility while recovering from Covid infection.

## 2023-10-02 NOTE — ED ADULT NURSE NOTE - ED STAT RN HANDOFF DETAILS 2
Report given to unit RN Kadine. Pt in NAD, A&Ox4. Pt refused skin assessment sample, continuous fluids. Pt remains agitated. Hep lock remains intact and patent. All belongings in patient's possession.

## 2023-10-02 NOTE — ED PROVIDER NOTE - PROGRESS NOTE DETAILS
Dr. Palmer informed and agrees to admit. Dr. Palmer informed and agrees to admit.  MAR informed that CT head still pending at the time of admission, but low clinical suspicion for ICH or acute intracranial process.

## 2023-10-02 NOTE — ED ADULT NURSE NOTE - OBJECTIVE STATEMENT
Patient presents to ED  from NH for agitation and suicidal ideation. Patient denies SI/HI at this time.

## 2023-10-02 NOTE — ED PROVIDER NOTE - PSYCHIATRIC, MLM
Alert and oriented to person, place, time/situation. normal mood and affect. no apparent risk to self or others.  Pt adamantly denies any suicidal ideation/thoughts.

## 2023-10-02 NOTE — ED ADULT TRIAGE NOTE - CHIEF COMPLAINT QUOTE
BIBA from NH for agitation and suicidal ideation. Pt is AXOX3, states, "I put on an act, so I can get the hell out of there. All I do there is lay there, and they do nothing for me." denies suicidal or homicidal ideation

## 2023-10-02 NOTE — ED PROVIDER NOTE - CLINICAL SUMMARY MEDICAL DECISION MAKING FREE TEXT BOX
89 y/o man, h/o HTN, CAD, sent from [facility] due to Pt becoming agitated and then stating suicidal ideation; he was reportedly a safety risk and refusing treatment, unable to be verbally de-escalated.  In the ED, Pt clearly denies suicidal ideation--labs, urine, CT head, discussed with Dr. Palmer for admission since he is not deemed a safe discharge.

## 2023-10-02 NOTE — H&P ADULT - NSHPPHYSICALEXAM_GEN_ALL_CORE
T(C): 36.4 (10-03-23 @ 00:05), Max: 36.6 (10-02-23 @ 15:53)  HR: 66 (10-03-23 @ 00:05) (66 - 76)  BP: 135/58 (10-03-23 @ 00:05) (94/59 - 135/58)  RR: 18 (10-03-23 @ 00:05) (18 - 18)  SpO2: 96% (10-03-23 @ 00:05) (95% - 96%)    CONSTITUTIONAL: acceptably groomed, no apparent distress    HEENT: normotramuatic, acephalic, PERRL and symmetric, EOMI, No conjunctival or scleral injection, non-icteric. Oral mucosa with moist membranes. No external nasal lesions; nasal mucosa not inflamed; no pharyngeal injection or exudates.               Neck: supple, symmetric and without tracheal deviation    RESPIRATORY: No respiratory distress, no use of accessory muscles; CTA b/l, no wheezes, rales or rhonchi    CARDIOVASCULAR: RRRR, +S1S2, +syst murmurs, no rubs, no gallops; no JVD; no peripheral edema  	Vascular: carotid pulse palpable, radial pulse palpable    GASTROINTESTINAL: +BS, Soft, non tender, non distended, no rebound, no guarding; No palpable masses    MUSCULOSKELETAL: No digital clubbing or cyanosis; examination of the (head/neck, spine/ribs/pelvis, RUE, LUE, RLE, LLE) without misalignment, no spinal tenderness    SKIN: No rashes noted; no subcutaneous nodules or induration palpable, +lentigo and scabs on b/l legs    NEUROLOGIC: sensation intact in upper and lower extremities b/l to light touch; strength appropriate    PSYCHIATRIC:  A+O x 3, recent/remote memory intact, irritable    LYMPHATIC: No cervical LAD or tenderness    GENITOURINARY: No suprapubic tenderness, no CVA tenderness

## 2023-10-02 NOTE — H&P ADULT - PROBLEM SELECTOR PLAN 1
-Emphatically denies that he wants to harm himself or has thoughts that he is better of dead  -He reportedly told ED staff he just said those things out of anger, and that he thinks the rehab sent him to the hospital to "get back at him"  -currently not on any behavioral meds, not on 1:1, he has been well behaved in the ED    -CTH done, f/u official read

## 2023-10-02 NOTE — H&P ADULT - HISTORY OF PRESENT ILLNESS
Pt is a 89 yo M from [facility], ambulates w cane at baseline, w CAD s/p stents, HTN, recently admitted to [facility] 9/18-9/22 for AHRF 2/2 Covid19 infection, discharged to [facility], now sent in after becoming agitated and then reportedly stating suicidal ideation to rehab staff. On interview, pt emphatically denied any complaints says "I'm all right," "there's nothing wrong with me, I don't need to be here," and that "I had an argument " with the staff at the rehab, and "they sent me here to get back at me."    He denied any headache, n/v/d, dyspnea, chest pain, dysuria. He emphatically denied any intention to hurt himself or suicidal ideation.

## 2023-10-02 NOTE — ED ADULT NURSE NOTE - ED STAT RN HANDOFF DETAILS
Unable to collect urine. Patient is incontinent of urine, refused straight cath/condom catheter/urinal. MD made aware. Patient refused skin assessment. No distress noted. Endorsed to LOAN Lopez.

## 2023-10-03 DIAGNOSIS — I10 ESSENTIAL (PRIMARY) HYPERTENSION: ICD-10-CM

## 2023-10-03 DIAGNOSIS — E11.9 TYPE 2 DIABETES MELLITUS WITHOUT COMPLICATIONS: ICD-10-CM

## 2023-10-03 DIAGNOSIS — N17.9 ACUTE KIDNEY FAILURE, UNSPECIFIED: ICD-10-CM

## 2023-10-03 DIAGNOSIS — Z86.59 PERSONAL HISTORY OF OTHER MENTAL AND BEHAVIORAL DISORDERS: ICD-10-CM

## 2023-10-03 DIAGNOSIS — E87.8 OTHER DISORDERS OF ELECTROLYTE AND FLUID BALANCE, NOT ELSEWHERE CLASSIFIED: ICD-10-CM

## 2023-10-03 DIAGNOSIS — I25.10 ATHEROSCLEROTIC HEART DISEASE OF NATIVE CORONARY ARTERY WITHOUT ANGINA PECTORIS: ICD-10-CM

## 2023-10-03 DIAGNOSIS — R79.89 OTHER SPECIFIED ABNORMAL FINDINGS OF BLOOD CHEMISTRY: ICD-10-CM

## 2023-10-03 DIAGNOSIS — Z29.9 ENCOUNTER FOR PROPHYLACTIC MEASURES, UNSPECIFIED: ICD-10-CM

## 2023-10-03 LAB
ANION GAP SERPL CALC-SCNC: 4 MMOL/L — LOW (ref 5–17)
BUN SERPL-MCNC: 46 MG/DL — HIGH (ref 7–18)
CALCIUM SERPL-MCNC: 8.2 MG/DL — LOW (ref 8.4–10.5)
CHLORIDE SERPL-SCNC: 106 MMOL/L — SIGNIFICANT CHANGE UP (ref 96–108)
CO2 SERPL-SCNC: 27 MMOL/L — SIGNIFICANT CHANGE UP (ref 22–31)
CREAT SERPL-MCNC: 1.22 MG/DL — SIGNIFICANT CHANGE UP (ref 0.5–1.3)
EGFR: 55 ML/MIN/1.73M2 — LOW
GLUCOSE BLDC GLUCOMTR-MCNC: 107 MG/DL — HIGH (ref 70–99)
GLUCOSE BLDC GLUCOMTR-MCNC: 107 MG/DL — HIGH (ref 70–99)
GLUCOSE BLDC GLUCOMTR-MCNC: 128 MG/DL — HIGH (ref 70–99)
GLUCOSE BLDC GLUCOMTR-MCNC: 97 MG/DL — SIGNIFICANT CHANGE UP (ref 70–99)
GLUCOSE SERPL-MCNC: 93 MG/DL — SIGNIFICANT CHANGE UP (ref 70–99)
HCT VFR BLD CALC: 28 % — LOW (ref 39–50)
HGB BLD-MCNC: 9 G/DL — LOW (ref 13–17)
MAGNESIUM SERPL-MCNC: 2.2 MG/DL — SIGNIFICANT CHANGE UP (ref 1.6–2.6)
MCHC RBC-ENTMCNC: 29.2 PG — SIGNIFICANT CHANGE UP (ref 27–34)
MCHC RBC-ENTMCNC: 32.1 GM/DL — SIGNIFICANT CHANGE UP (ref 32–36)
MCV RBC AUTO: 90.9 FL — SIGNIFICANT CHANGE UP (ref 80–100)
NRBC # BLD: 0 /100 WBCS — SIGNIFICANT CHANGE UP (ref 0–0)
PHOSPHATE SERPL-MCNC: 3.8 MG/DL — SIGNIFICANT CHANGE UP (ref 2.5–4.5)
PLATELET # BLD AUTO: 95 K/UL — LOW (ref 150–400)
POTASSIUM SERPL-MCNC: 4.4 MMOL/L — SIGNIFICANT CHANGE UP (ref 3.5–5.3)
POTASSIUM SERPL-SCNC: 4.4 MMOL/L — SIGNIFICANT CHANGE UP (ref 3.5–5.3)
RBC # BLD: 3.08 M/UL — LOW (ref 4.2–5.8)
RBC # FLD: 16.4 % — HIGH (ref 10.3–14.5)
SODIUM SERPL-SCNC: 137 MMOL/L — SIGNIFICANT CHANGE UP (ref 135–145)
WBC # BLD: 5.1 K/UL — SIGNIFICANT CHANGE UP (ref 3.8–10.5)
WBC # FLD AUTO: 5.1 K/UL — SIGNIFICANT CHANGE UP (ref 3.8–10.5)

## 2023-10-03 PROCEDURE — 70450 CT HEAD/BRAIN W/O DYE: CPT | Mod: 26

## 2023-10-03 RX ORDER — METOPROLOL TARTRATE 50 MG
25 TABLET ORAL DAILY
Refills: 0 | Status: DISCONTINUED | OUTPATIENT
Start: 2023-10-03 | End: 2023-10-04

## 2023-10-03 RX ORDER — POLYETHYLENE GLYCOL 3350 17 G/17G
17 POWDER, FOR SOLUTION ORAL
Refills: 0 | Status: DISCONTINUED | OUTPATIENT
Start: 2023-10-03 | End: 2023-10-04

## 2023-10-03 RX ORDER — INSULIN LISPRO 100/ML
VIAL (ML) SUBCUTANEOUS AT BEDTIME
Refills: 0 | Status: DISCONTINUED | OUTPATIENT
Start: 2023-10-03 | End: 2023-10-04

## 2023-10-03 RX ORDER — ATORVASTATIN CALCIUM 80 MG/1
20 TABLET, FILM COATED ORAL AT BEDTIME
Refills: 0 | Status: DISCONTINUED | OUTPATIENT
Start: 2023-10-03 | End: 2023-10-04

## 2023-10-03 RX ORDER — INSULIN LISPRO 100/ML
VIAL (ML) SUBCUTANEOUS
Refills: 0 | Status: DISCONTINUED | OUTPATIENT
Start: 2023-10-03 | End: 2023-10-04

## 2023-10-03 RX ORDER — SODIUM CHLORIDE 9 MG/ML
1000 INJECTION INTRAMUSCULAR; INTRAVENOUS; SUBCUTANEOUS
Refills: 0 | Status: COMPLETED | OUTPATIENT
Start: 2023-10-03 | End: 2023-10-03

## 2023-10-03 RX ORDER — ASPIRIN/CALCIUM CARB/MAGNESIUM 324 MG
81 TABLET ORAL DAILY
Refills: 0 | Status: DISCONTINUED | OUTPATIENT
Start: 2023-10-03 | End: 2023-10-04

## 2023-10-03 RX ORDER — INFLUENZA VIRUS VACCINE 15; 15; 15; 15 UG/.5ML; UG/.5ML; UG/.5ML; UG/.5ML
0.7 SUSPENSION INTRAMUSCULAR ONCE
Refills: 0 | Status: COMPLETED | OUTPATIENT
Start: 2023-10-03 | End: 2023-10-04

## 2023-10-03 RX ORDER — LOSARTAN POTASSIUM 100 MG/1
100 TABLET, FILM COATED ORAL DAILY
Refills: 0 | Status: DISCONTINUED | OUTPATIENT
Start: 2023-10-03 | End: 2023-10-04

## 2023-10-03 RX ORDER — CLOPIDOGREL BISULFATE 75 MG/1
75 TABLET, FILM COATED ORAL DAILY
Refills: 0 | Status: DISCONTINUED | OUTPATIENT
Start: 2023-10-03 | End: 2023-10-04

## 2023-10-03 RX ORDER — SENNA PLUS 8.6 MG/1
2 TABLET ORAL AT BEDTIME
Refills: 0 | Status: DISCONTINUED | OUTPATIENT
Start: 2023-10-03 | End: 2023-10-04

## 2023-10-03 RX ORDER — ACETAMINOPHEN 500 MG
650 TABLET ORAL EVERY 6 HOURS
Refills: 0 | Status: DISCONTINUED | OUTPATIENT
Start: 2023-10-03 | End: 2023-10-04

## 2023-10-03 RX ORDER — ONDANSETRON 8 MG/1
4 TABLET, FILM COATED ORAL EVERY 8 HOURS
Refills: 0 | Status: DISCONTINUED | OUTPATIENT
Start: 2023-10-03 | End: 2023-10-04

## 2023-10-03 RX ADMIN — Medication 25 MILLIGRAM(S): at 05:37

## 2023-10-03 RX ADMIN — ATORVASTATIN CALCIUM 20 MILLIGRAM(S): 80 TABLET, FILM COATED ORAL at 22:14

## 2023-10-03 RX ADMIN — SENNA PLUS 2 TABLET(S): 8.6 TABLET ORAL at 22:14

## 2023-10-03 RX ADMIN — Medication 81 MILLIGRAM(S): at 12:41

## 2023-10-03 RX ADMIN — POLYETHYLENE GLYCOL 3350 17 GRAM(S): 17 POWDER, FOR SOLUTION ORAL at 05:40

## 2023-10-03 RX ADMIN — LOSARTAN POTASSIUM 100 MILLIGRAM(S): 100 TABLET, FILM COATED ORAL at 05:37

## 2023-10-03 RX ADMIN — SODIUM CHLORIDE 75 MILLILITER(S): 9 INJECTION INTRAMUSCULAR; INTRAVENOUS; SUBCUTANEOUS at 01:38

## 2023-10-03 RX ADMIN — SODIUM CHLORIDE 75 MILLILITER(S): 9 INJECTION INTRAMUSCULAR; INTRAVENOUS; SUBCUTANEOUS at 14:09

## 2023-10-03 RX ADMIN — CLOPIDOGREL BISULFATE 75 MILLIGRAM(S): 75 TABLET, FILM COATED ORAL at 12:41

## 2023-10-03 NOTE — ED ADULT NURSE REASSESSMENT NOTE - NSFALLHARMRISKINTERV_ED_ALL_ED

## 2023-10-03 NOTE — PATIENT PROFILE ADULT - FALL HARM RISK - HARM RISK INTERVENTIONS

## 2023-10-03 NOTE — ED ADULT NURSE REASSESSMENT NOTE - NS ED NURSE REASSESS COMMENT FT1
Report received from night shift RN. Pt in NAD. Agitated. Refused skin assessment and to give urine. Pt denies Suicidal ideation or homicidal ideation. Pt is no threat to himself at this time. Safety maintained. awaiting room assignment.

## 2023-10-04 ENCOUNTER — TRANSCRIPTION ENCOUNTER (OUTPATIENT)
Age: 88
End: 2023-10-04

## 2023-10-04 VITALS
HEART RATE: 60 BPM | OXYGEN SATURATION: 97 % | RESPIRATION RATE: 18 BRPM | TEMPERATURE: 98 F | DIASTOLIC BLOOD PRESSURE: 68 MMHG | SYSTOLIC BLOOD PRESSURE: 134 MMHG

## 2023-10-04 LAB
ANION GAP SERPL CALC-SCNC: 2 MMOL/L — LOW (ref 5–17)
APPEARANCE UR: ABNORMAL
BILIRUB UR-MCNC: NEGATIVE — SIGNIFICANT CHANGE UP
BUN SERPL-MCNC: 33 MG/DL — HIGH (ref 7–18)
CALCIUM SERPL-MCNC: 8.6 MG/DL — SIGNIFICANT CHANGE UP (ref 8.4–10.5)
CHLORIDE SERPL-SCNC: 106 MMOL/L — SIGNIFICANT CHANGE UP (ref 96–108)
CO2 SERPL-SCNC: 30 MMOL/L — SIGNIFICANT CHANGE UP (ref 22–31)
COLOR SPEC: YELLOW — SIGNIFICANT CHANGE UP
CREAT SERPL-MCNC: 0.94 MG/DL — SIGNIFICANT CHANGE UP (ref 0.5–1.3)
DIFF PNL FLD: ABNORMAL
EGFR: 75 ML/MIN/1.73M2 — SIGNIFICANT CHANGE UP
GLUCOSE BLDC GLUCOMTR-MCNC: 102 MG/DL — HIGH (ref 70–99)
GLUCOSE BLDC GLUCOMTR-MCNC: 88 MG/DL — SIGNIFICANT CHANGE UP (ref 70–99)
GLUCOSE SERPL-MCNC: 88 MG/DL — SIGNIFICANT CHANGE UP (ref 70–99)
GLUCOSE UR QL: NEGATIVE MG/DL — SIGNIFICANT CHANGE UP
HCT VFR BLD CALC: 30.4 % — LOW (ref 39–50)
HGB BLD-MCNC: 9.7 G/DL — LOW (ref 13–17)
KETONES UR-MCNC: NEGATIVE MG/DL — SIGNIFICANT CHANGE UP
LEUKOCYTE ESTERASE UR-ACNC: ABNORMAL
MCHC RBC-ENTMCNC: 28.9 PG — SIGNIFICANT CHANGE UP (ref 27–34)
MCHC RBC-ENTMCNC: 31.9 GM/DL — LOW (ref 32–36)
MCV RBC AUTO: 90.5 FL — SIGNIFICANT CHANGE UP (ref 80–100)
NITRITE UR-MCNC: POSITIVE
NRBC # BLD: 0 /100 WBCS — SIGNIFICANT CHANGE UP (ref 0–0)
PH UR: 5 — SIGNIFICANT CHANGE UP (ref 5–8)
PLATELET # BLD AUTO: 104 K/UL — LOW (ref 150–400)
POTASSIUM SERPL-MCNC: 4.3 MMOL/L — SIGNIFICANT CHANGE UP (ref 3.5–5.3)
POTASSIUM SERPL-SCNC: 4.3 MMOL/L — SIGNIFICANT CHANGE UP (ref 3.5–5.3)
PROT UR-MCNC: NEGATIVE MG/DL — SIGNIFICANT CHANGE UP
RBC # BLD: 3.36 M/UL — LOW (ref 4.2–5.8)
RBC # FLD: 16.3 % — HIGH (ref 10.3–14.5)
SODIUM SERPL-SCNC: 138 MMOL/L — SIGNIFICANT CHANGE UP (ref 135–145)
SP GR SPEC: 1.01 — SIGNIFICANT CHANGE UP (ref 1–1.03)
UROBILINOGEN FLD QL: 1 MG/DL — SIGNIFICANT CHANGE UP (ref 0.2–1)
WBC # BLD: 5.2 K/UL — SIGNIFICANT CHANGE UP (ref 3.8–10.5)
WBC # FLD AUTO: 5.2 K/UL — SIGNIFICANT CHANGE UP (ref 3.8–10.5)

## 2023-10-04 PROCEDURE — 84100 ASSAY OF PHOSPHORUS: CPT

## 2023-10-04 PROCEDURE — 93005 ELECTROCARDIOGRAM TRACING: CPT

## 2023-10-04 PROCEDURE — 80053 COMPREHEN METABOLIC PANEL: CPT

## 2023-10-04 PROCEDURE — 84443 ASSAY THYROID STIM HORMONE: CPT

## 2023-10-04 PROCEDURE — 36415 COLL VENOUS BLD VENIPUNCTURE: CPT

## 2023-10-04 PROCEDURE — 87086 URINE CULTURE/COLONY COUNT: CPT

## 2023-10-04 PROCEDURE — 99285 EMERGENCY DEPT VISIT HI MDM: CPT

## 2023-10-04 PROCEDURE — 85025 COMPLETE CBC W/AUTO DIFF WBC: CPT

## 2023-10-04 PROCEDURE — 81001 URINALYSIS AUTO W/SCOPE: CPT

## 2023-10-04 PROCEDURE — 85610 PROTHROMBIN TIME: CPT

## 2023-10-04 PROCEDURE — 85027 COMPLETE CBC AUTOMATED: CPT

## 2023-10-04 PROCEDURE — 85730 THROMBOPLASTIN TIME PARTIAL: CPT

## 2023-10-04 PROCEDURE — 82962 GLUCOSE BLOOD TEST: CPT

## 2023-10-04 PROCEDURE — 90662 IIV NO PRSV INCREASED AG IM: CPT

## 2023-10-04 PROCEDURE — 70450 CT HEAD/BRAIN W/O DYE: CPT | Mod: MA

## 2023-10-04 PROCEDURE — 80048 BASIC METABOLIC PNL TOTAL CA: CPT

## 2023-10-04 PROCEDURE — 80307 DRUG TEST PRSMV CHEM ANLYZR: CPT

## 2023-10-04 PROCEDURE — 86703 HIV-1/HIV-2 1 RESULT ANTBDY: CPT

## 2023-10-04 PROCEDURE — 83735 ASSAY OF MAGNESIUM: CPT

## 2023-10-04 RX ORDER — ACETAMINOPHEN 500 MG
2 TABLET ORAL
Qty: 0 | Refills: 0 | DISCHARGE
Start: 2023-10-04

## 2023-10-04 RX ADMIN — LOSARTAN POTASSIUM 100 MILLIGRAM(S): 100 TABLET, FILM COATED ORAL at 05:09

## 2023-10-04 RX ADMIN — Medication 25 MILLIGRAM(S): at 05:09

## 2023-10-04 RX ADMIN — INFLUENZA VIRUS VACCINE 0.7 MILLILITER(S): 15; 15; 15; 15 SUSPENSION INTRAMUSCULAR at 13:21

## 2023-10-04 RX ADMIN — CLOPIDOGREL BISULFATE 75 MILLIGRAM(S): 75 TABLET, FILM COATED ORAL at 12:06

## 2023-10-04 RX ADMIN — POLYETHYLENE GLYCOL 3350 17 GRAM(S): 17 POWDER, FOR SOLUTION ORAL at 05:10

## 2023-10-04 RX ADMIN — Medication 81 MILLIGRAM(S): at 12:06

## 2023-10-04 NOTE — PHYSICAL THERAPY INITIAL EVALUATION ADULT - FOLLOWS COMMANDS/ANSWERS QUESTIONS, REHAB EVAL
pt. resistant and non compliant with commands relatied to performing exercises, and ambulation with rolling walker and calling for RN when wanted to get out of bed or using the bathroom/25% of the time 75% of the time

## 2023-10-04 NOTE — DISCHARGE NOTE PROVIDER - NSDCCPCAREPLAN_GEN_ALL_CORE_FT
PRINCIPAL DISCHARGE DIAGNOSIS  Diagnosis: THOMAS (acute kidney injury)  Assessment and Plan of Treatment: You presented with behavioral issues per nursing home staff - however, denies any suicidal ideation nor agitated throughout the hospital stay.   However, you were found to have Acute kidney injury which  is resolved with VI fluids   TO prevent further damage to your kidenys   - Avoid taking (NSAIDs) - (ex: Ibuprofen, Advil, Celebrex, Naprosyn)  -Avoid taking any nephrotoxic agents (can harm kidneys)   - Intravenous contrast for diagnostic testing, combination cold medications.  -Have all medications adjusted for your renal function by your Health Care Provider.  -Blood pressure control is important.  Take all medication as prescribed.        SECONDARY DISCHARGE DIAGNOSES  Diagnosis: Dementia, Alzheimer's, with behavior disturbance  Assessment and Plan of Treatment:      PRINCIPAL DISCHARGE DIAGNOSIS  Diagnosis: THOMAS (acute kidney injury)  Assessment and Plan of Treatment: You presented with behavioral issues per nursing home staff - however, denies any suicidal ideation nor agitated throughout the hospital stay.   However, you were found to have Acute kidney injury which  is resolved with VI fluids   TO prevent further damage to your kidenys   - Avoid taking (NSAIDs) - (ex: Ibuprofen, Advil, Celebrex, Naprosyn)  -Avoid taking any nephrotoxic agents (can harm kidneys)   - Intravenous contrast for diagnostic testing, combination cold medications.  -Have all medications adjusted for your renal function by your Health Care Provider.  -Blood pressure control is important.  Take all medication as prescribed.

## 2023-10-04 NOTE — PHYSICAL THERAPY INITIAL EVALUATION ADULT - CRITERIA FOR SKILLED THERAPEUTIC INTERVENTIONS
MMT, gait, balance/impairments found MMT, gait, balance/impairments found/functional limitations in following categories/risk reduction/prevention/rehab potential

## 2023-10-04 NOTE — PHYSICAL THERAPY INITIAL EVALUATION ADULT - PERTINENT HX OF CURRENT PROBLEM, REHAB EVAL
Pt. has PMHx of "CAD (coronary artery disease) ,DM (diabetes mellitus), Stented coronary artery." and PSHx of appendectomy. Patient was brought to ED from NH with restlessness and agitation

## 2023-10-04 NOTE — PHYSICAL THERAPY INITIAL EVALUATION ADULT - WEIGHT-BEARING RESTRICTIONS: BED/CHAIR, REHAB EVAL
full weight-bearing Attempted with SCandelariacanwendie; patient was unsteady and was given RW to safely ambulate/full weight-bearing

## 2023-10-04 NOTE — DISCHARGE NOTE NURSING/CASE MANAGEMENT/SOCIAL WORK - PATIENT PORTAL LINK FT
You can access the FollowMyHealth Patient Portal offered by [facility] by registering at the following website: [email or web address]. By joining [employer]’s FollowMyHealth portal, you will also be able to view your health information using other applications (apps) compatible with our system.

## 2023-10-04 NOTE — PHYSICAL THERAPY INITIAL EVALUATION ADULT - ADDITIONAL COMMENTS
standard Cane; pt. currently unable to use cane to ambulate due to increased instability, LE weakness bilaterally, pt. able to perform sit to stand with rolling walker and ambulate with assistance at time of evaluation

## 2023-10-04 NOTE — DISCHARGE NOTE PROVIDER - NSDCMRMEDTOKEN_GEN_ALL_CORE_FT
Actos 15 mg oral tablet: 1 tab(s) orally once a day  Aspir 81 oral delayed release tablet: 1 tab(s) orally once a day  benzonatate 100 mg oral capsule: 1 cap(s) orally 3 times a day As needed Cough  Chloraseptic 6 mg-10 mg mucous membrane lozenge: 1 lozenge mucous membrane every 4 hours as needed for  cough  Januvia 100 mg oral tablet: 1 tab(s) orally once a day  Lipitor 20 mg oral tablet: 1 tab(s) orally once a day  metoprolol succinate 25 mg oral tablet, extended release: 1 tab(s) orally once a day  Micardis 80 mg oral tablet: 1 tab(s) orally once a day  Plavix 75 mg oral tablet: 1 tab(s) orally once a day  polyethylene glycol 3350 oral powder for reconstitution: 17 gram(s) orally 2 times a day  senna leaf extract oral tablet: 2 tab(s) orally once a day (at bedtime)   acetaminophen 325 mg oral tablet: 2 tab(s) orally every 6 hours As needed Temp greater or equal to 38C (100.4F), Mild Pain (1 - 3)  Actos 15 mg oral tablet: 1 tab(s) orally once a day  Aspir 81 oral delayed release tablet: 1 tab(s) orally once a day  benzonatate 100 mg oral capsule: 1 cap(s) orally 3 times a day As needed Cough  Chloraseptic 6 mg-10 mg mucous membrane lozenge: 1 lozenge mucous membrane every 4 hours as needed for  cough  Januvia 100 mg oral tablet: 1 tab(s) orally once a day  Lipitor 20 mg oral tablet: 1 tab(s) orally once a day  metoprolol succinate 25 mg oral tablet, extended release: 1 tab(s) orally once a day  Micardis 80 mg oral tablet: 1 tab(s) orally once a day  Plavix 75 mg oral tablet: 1 tab(s) orally once a day  polyethylene glycol 3350 oral powder for reconstitution: 17 gram(s) orally 2 times a day  senna leaf extract oral tablet: 2 tab(s) orally once a day (at bedtime)

## 2023-10-04 NOTE — DISCHARGE NOTE PROVIDER - HOSPITAL COURSE
Pt is a 89 yo M from [facility], ambulates w cane at baseline, w CAD s/p stents, HTN, recently admitted to [facility] 9/18-9/22 for AHRF 2/2 Covid19 infection, discharged to [facility], sent in due to agitation with suicidal ideation.  However, pt denies suicidal ideation on admission and no behavioral disturbance noted in ED   Pt was admitted to medicine for CLEO   Cleo resolved with IVF hydration. Pt remains calm during this hospital stay, medically optimized discharge to [facility]    Incomplete 10/4 Pt is a 89 yo M from [facility], ambulates w cane at baseline, w CAD s/p stents, HTN, recently admitted to [facility] 9/18-9/22 for AHRF 2/2 Covid19 infection, discharged to [facility], sent in due to agitation with suicidal ideation.  However, pt denies suicidal ideation on admission and no behavioral disturbance noted in ED   Pt was admitted to medicine for THOMAS and hyponatremia.    Hyponatremia and THOMAS resolved with IVF.    PT recommends [facility].  Patient refuses adamant about returning home.    Case discussed with attending.  Given patient's improved clinical status and current hemodynamic stability, the decision was made for discharge.    This is a summary of the patients hospitalization.  For full hospital course, please see medical record.

## 2023-10-04 NOTE — PHYSICAL THERAPY INITIAL EVALUATION ADULT - FUNCTIONAL LIMITATIONS, PT EVAL
poor standing balance, (B)LE weakness, ambulation restrictions/self-care/home management/community/leisure self-care/home management/community/leisure

## 2023-10-04 NOTE — PHYSICAL THERAPY INITIAL EVALUATION ADULT - PERSONAL SAFETY AND JUDGMENT, REHAB EVAL
pt. denies need for RN assistance to get out of bed although presentign with ambulatory difficulties and (B)LE weakness/at risk behaviors demonstrated

## 2023-10-04 NOTE — DISCHARGE NOTE NURSING/CASE MANAGEMENT/SOCIAL WORK - NSDCVIVACCINE_GEN_ALL_CORE_FT
influenza, high-dose, quadrivalent; 04-Oct-2023 13:21; Mariah Hernandez (RN); [employer]; [id number] (Exp. Date: 30-Jun-2024); IntraMuscular; Deltoid Left.; 0.7 milliLiter(s); VIS (VIS Published: 06-Aug-2021, VIS Presented: 04-Oct-2023);

## 2023-10-04 NOTE — PHYSICAL THERAPY INITIAL EVALUATION ADULT - IMPAIRMENTS FOUND, PT EVAL
aerobic capacity/endurance/circulation/gait, locomotion, and balance/muscle strength/poor safety awareness

## 2023-10-04 NOTE — PHYSICAL THERAPY INITIAL EVALUATION ADULT - DIAGNOSIS, PT EVAL
balance deficits, weakness, ambulation difficulties Patient presented with balance deficits, weakness and was unsteady with ambulation

## 2023-10-04 NOTE — PHYSICAL THERAPY INITIAL EVALUATION ADULT - GENERAL OBSERVATIONS, REHAB EVAL
Pt. sleeping at time arrival, A&O x 4, displaying signs of mild annoyance and agitation when instructed to get out of bed and use rolling walker Patient was seen in supine on bed sleeping(awoke on arousal)

## 2023-10-06 LAB
CULTURE RESULTS: SIGNIFICANT CHANGE UP
SPECIMEN SOURCE: SIGNIFICANT CHANGE UP

## 2023-11-13 NOTE — PROGRESS NOTE ADULT - SUBJECTIVE AND OBJECTIVE BOX
Clinic Care Coordination Contact  Follow Up Progress Note      Assessment: The pt was recently in the ED, I called to check up on the pt and help the pt setup a ED follow up. The pt was at [facility] for a cough, and shortness of breath. I called the pt, but got her vm, so I left a vm for the pt to give me a call back.     Care Gaps:    Health Maintenance Due   Topic Date Due    COPD ACTION PLAN  Never done    ADVANCE CARE PLANNING  11/15/2017    LUNG CANCER SCREENING  10/02/2018    ZOSTER IMMUNIZATION (3 of 3) 11/30/2018    MAMMO SCREENING  09/10/2021    MICROALBUMIN  09/17/2022    MEDICARE ANNUAL WELLNESS VISIT  11/02/2022    COLORECTAL CANCER SCREENING  11/22/2022    INFLUENZA VACCINE (1) 09/01/2023    COVID-19 Vaccine (3 - 2023-24 season) 09/01/2023           Care Plans      Intervention/Education provided during outreach:               Plan:     Care Coordinator will follow up in   
HPI:  89 y/o M with a significant PMHx of CAD with stents, DM and a significant PSHx of appendectomy presents to the ED after diagnosis of COVID [facility]. Pt reports cough for 2 weeks but denies fever, SOB or chest pain. In ED, pt was saturating 93-94% on RA and 88-89% on ambulation. Pt denies any N/V/D, abdominal pain, urinary symptoms or any other acute complaints.    In ED, /68, HR 98, SaO2 97% on 2L NC (11 Mar 2021 18:10)      Patient is a 89y old  Male who presents with a chief complaint of COUGH x 2 weeks (14 Mar 2021 17:39)      INTERVAL HPI/OVERNIGHT EVENTS:  T(C): 36.7 (03-15-21 @ 05:33), Max: 36.7 (21 @ 21:16)  HR: 64 (03-15-21 @ 12:20) (54 - 64)  BP: 162/72 (03-15-21 @ 05:33) (151/62 - 174/60)  RR: 18 (03-15-21 @ 05:33) (18 - 18)  SpO2: 93% (03-15-21 @ 12:20) (93% - 99%)  Wt(kg): --  I&O's Summary    14 Mar 2021 07:01  -  15 Mar 2021 07:00  --------------------------------------------------------  IN: 0 mL / OUT: 200 mL / NET: -200 mL        REVIEW OF SYSTEMS: denies fever, chills, SOB, palpitations, chest pain, abdominal pain, nausea, vomitting, diarrhea, constipation, dizziness    MEDICATIONS  (STANDING):  aspirin enteric coated 81 milliGRAM(s) Oral daily  atorvastatin 20 milliGRAM(s) Oral at bedtime  clopidogrel Tablet 75 milliGRAM(s) Oral daily  dexAMETHasone  Injectable 6 milliGRAM(s) IV Push daily  enoxaparin Injectable 40 milliGRAM(s) SubCutaneous daily  insulin lispro (ADMELOG) corrective regimen sliding scale   SubCutaneous three times a day before meals  losartan 100 milliGRAM(s) Oral daily  metoprolol succinate ER 50 milliGRAM(s) Oral daily  pantoprazole    Tablet 40 milliGRAM(s) Oral before breakfast    MEDICATIONS  (PRN):      PHYSICAL EXAM:  GENERAL: NAD, well-groomed, well-developed  HEAD:  Atraumatic, Normocephalic  EYES: EOMI, PERRLA, conjunctiva and sclera clear  ENMT: No tonsillar erythema, exudates, or enlargement; Moist mucous membranes, Good dentition, No lesions  NECK: Supple, No JVD, Normal thyroid  NERVOUS SYSTEM:  Alert & Oriented X3, Good concentration; Motor Strength 5/5 B/L upper and lower extremities; DTRs 2+ intact and symmetric  CHEST/LUNG: Clear to percussion bilaterally; No rales, rhonchi, wheezing, or rubs  HEART: Regular rate and rhythm; No murmurs, rubs, or gallops  ABDOMEN: Soft, Nontender, Nondistended; Bowel sounds present  EXTREMITIES:  2+ Peripheral Pulses, No clubbing, cyanosis, or edema  LYMPH: No lymphadenopathy noted  SKIN: No rashes or lesions  LABS:                        11.5   2.91  )-----------( 107      ( 15 Mar 2021 06:17 )             34.9     03-15    140  |  105  |  34<H>  ----------------------------<  76  3.9   |  28  |  1.00    Ca    8.7      15 Mar 2021 06:17        Urinalysis Basic - ( 14 Mar 2021 16:48 )    Color: Yellow / Appearance: Clear / S.015 / pH: x  Gluc: x / Ketone: Negative  / Bili: Negative / Urobili: Negative   Blood: x / Protein: Negative / Nitrite: Negative   Leuk Esterase: Negative / RBC: 10-25 /HPF / WBC 3-5 /HPF   Sq Epi: x / Non Sq Epi: Few /HPF / Bacteria: Trace /HPF      CAPILLARY BLOOD GLUCOSE      POCT Blood Glucose.: 184 mg/dL (15 Mar 2021 12:52)  POCT Blood Glucose.: 216 mg/dL (15 Mar 2021 11:14)  POCT Blood Glucose.: 110 mg/dL (15 Mar 2021 07:56)  POCT Blood Glucose.: 104 mg/dL (14 Mar 2021 17:01)        Urinalysis Basic - ( 14 Mar 2021 16:48 )    Color: Yellow / Appearance: Clear / S.015 / pH: x  Gluc: x / Ketone: Negative  / Bili: Negative / Urobili: Negative   Blood: x / Protein: Negative / Nitrite: Negative   Leuk Esterase: Negative / RBC: 10-25 /HPF / WBC 3-5 /HPF   Sq Epi: x / Non Sq Epi: Few /HPF / Bacteria: Trace /HPF    
HPI:  89 y/o M with a significant PMHx of CAD with stents, DM and a significant PSHx of appendectomy presents to the ED after diagnosis of COVID at [facility] today. Pt reports cough for 2 weeks but denies fever, SOB or chest pain. In ED, pt was saturating 93-94% on RA and 88-89% on ambulation. Pt denies any N/V/D, abdominal pain, urinary symptoms or any other acute complaints.    In ED, /68, HR 98, SaO2 97% on 2L NC (11 Mar 2021 18:10)      Patient is a 89y old  Male who presents with a chief complaint of     INTERVAL HPI/OVERNIGHT EVENTS:  T(C): 37.1 (03-12-21 @ 14:25), Max: 37.2 (03-11-21 @ 15:21)  HR: 56 (03-12-21 @ 14:25) (50 - 98)  BP: 128/47 (03-12-21 @ 14:25) (112/68 - 182/77)  RR: 17 (03-12-21 @ 14:25) (17 - 18)  SpO2: 99% (03-12-21 @ 14:25) (95% - 100%)  Wt(kg): --  I&O's Summary      REVIEW OF SYSTEMS: denies fever, chills, SOB, palpitations, chest pain, abdominal pain, nausea, vomitting, diarrhea, constipation, dizziness    MEDICATIONS  (STANDING):  aspirin enteric coated 81 milliGRAM(s) Oral daily  atorvastatin 20 milliGRAM(s) Oral at bedtime  clopidogrel Tablet 75 milliGRAM(s) Oral daily  dexAMETHasone  Injectable 6 milliGRAM(s) IV Push daily  enoxaparin Injectable 40 milliGRAM(s) SubCutaneous daily  insulin lispro (ADMELOG) corrective regimen sliding scale   SubCutaneous three times a day before meals  losartan 100 milliGRAM(s) Oral daily  metoprolol succinate ER 50 milliGRAM(s) Oral daily    MEDICATIONS  (PRN):      PHYSICAL EXAM:  GENERAL: NAD, well-groomed, well-developed  HEAD:  Atraumatic, Normocephalic  EYES: EOMI, PERRLA, conjunctiva and sclera clear  ENMT: No tonsillar erythema, exudates, or enlargement; Moist mucous membranes, Good dentition, No lesions  NECK: Supple, No JVD, Normal thyroid  NERVOUS SYSTEM:  Alert & Oriented X3, Good concentration; Motor Strength 5/5 B/L upper and lower extremities; DTRs 2+ intact and symmetric  CHEST/LUNG: Clear to percussion bilaterally; No rales, rhonchi, wheezing, or rubs  HEART: Regular rate and rhythm; No murmurs, rubs, or gallops  ABDOMEN: Soft, Nontender, Nondistended; Bowel sounds present  EXTREMITIES:  2+ Peripheral Pulses, No clubbing, cyanosis, or edema  LYMPH: No lymphadenopathy noted  SKIN: No rashes or lesions  LABS:                        11.1   2.97  )-----------( 109      ( 12 Mar 2021 07:07 )             34.1     03-12    139  |  107  |  32<H>  ----------------------------<  111<H>  4.1   |  25  |  0.85    Ca    8.5      12 Mar 2021 07:07  Phos  4.9     03-12  Mg     2.1     03-12    TPro  6.5  /  Alb  3.2<L>  /  TBili  0.6  /  DBili  x   /  AST  22  /  ALT  28  /  AlkPhos  85  03-12    PT/INR - ( 11 Mar 2021 16:44 )   PT: 12.4 sec;   INR: 1.04 ratio         PTT - ( 11 Mar 2021 16:44 )  PTT:36.5 sec    CAPILLARY BLOOD GLUCOSE      POCT Blood Glucose.: 270 mg/dL (12 Mar 2021 11:22)  POCT Blood Glucose.: 111 mg/dL (12 Mar 2021 08:19)        
HPI:  89 y/o M with a significant PMHx of CAD with stents, DM and a significant PSHx of appendectomy presents to the ED after diagnosis of COVID [facility] today. Pt reports cough for 2 weeks but denies fever, SOB or chest pain. In ED, pt was saturating 93-94% on RA and 88-89% on ambulation. Pt denies any N/V/D, abdominal pain, urinary symptoms or any other acute complaints.    In ED, /68, HR 98, SaO2 97% on 2L NC (11 Mar 2021 18:10)      Patient is a 89y old  Male who presents with a chief complaint of covid-19 infection (12 Mar 2021 18:37)      INTERVAL HPI/OVERNIGHT EVENTS:  T(C): 36.9 (03-13-21 @ 14:10), Max: 36.9 (03-13-21 @ 14:10)  HR: 97 (03-13-21 @ 14:10) (53 - 104)  BP: 135/58 (03-13-21 @ 14:10) (114/52 - 135/58)  RR: 18 (03-13-21 @ 14:10) (18 - 18)  SpO2: 95% (03-13-21 @ 14:10) (95% - 98%)  Wt(kg): --  I&O's Summary      REVIEW OF SYSTEMS: denies fever, chills, SOB, palpitations, chest pain, abdominal pain, nausea, vomitting, diarrhea, constipation, dizziness    MEDICATIONS  (STANDING):  aspirin enteric coated 81 milliGRAM(s) Oral daily  atorvastatin 20 milliGRAM(s) Oral at bedtime  clopidogrel Tablet 75 milliGRAM(s) Oral daily  dexAMETHasone  Injectable 6 milliGRAM(s) IV Push daily  enoxaparin Injectable 40 milliGRAM(s) SubCutaneous daily  insulin lispro (ADMELOG) corrective regimen sliding scale   SubCutaneous three times a day before meals  losartan 100 milliGRAM(s) Oral daily  metoprolol succinate ER 50 milliGRAM(s) Oral daily    MEDICATIONS  (PRN):      PHYSICAL EXAM:  GENERAL: NAD, well-groomed, well-developed  HEAD:  Atraumatic, Normocephalic  EYES: EOMI, PERRLA, conjunctiva and sclera clear  ENMT: No tonsillar erythema, exudates, or enlargement; Moist mucous membranes, Good dentition, No lesions  NECK: Supple, No JVD, Normal thyroid  NERVOUS SYSTEM:  Alert & Oriented X3, Good concentration; Motor Strength 5/5 B/L upper and lower extremities; DTRs 2+ intact and symmetric  CHEST/LUNG: Clear to percussion bilaterally; No rales, rhonchi, wheezing, or rubs  HEART: Regular rate and rhythm; No murmurs, rubs, or gallops  ABDOMEN: Soft, Nontender, Nondistended; Bowel sounds present  EXTREMITIES:  2+ Peripheral Pulses, No clubbing, cyanosis, or edema  LYMPH: No lymphadenopathy noted  SKIN: No rashes or lesions  LABS:                        10.5   3.58  )-----------( 100      ( 13 Mar 2021 06:18 )             32.5     03-13    141  |  108  |  40<H>  ----------------------------<  84  4.2   |  27  |  1.09    Ca    8.4      13 Mar 2021 06:18  Phos  4.9     03-12  Mg     2.1     03-12    TPro  6.5  /  Alb  3.2<L>  /  TBili  0.6  /  DBili  x   /  AST  22  /  ALT  28  /  AlkPhos  85  03-12        CAPILLARY BLOOD GLUCOSE      POCT Blood Glucose.: 127 mg/dL (13 Mar 2021 16:48)  POCT Blood Glucose.: 177 mg/dL (13 Mar 2021 11:40)  POCT Blood Glucose.: 121 mg/dL (13 Mar 2021 08:32)  POCT Blood Glucose.: 122 mg/dL (12 Mar 2021 22:34)        
NP Note discussed with Primary Attending.      Patient is a 89y old  Male who presents with a chief complaint of shortness of breath.    INTERVAL HPI/OVERNIGHT EVENTS: no new complaints    MEDICATIONS  (STANDING):  aspirin enteric coated 81 milliGRAM(s) Oral daily  atorvastatin 20 milliGRAM(s) Oral at bedtime  clopidogrel Tablet 75 milliGRAM(s) Oral daily  dexAMETHasone  Injectable 6 milliGRAM(s) IV Push daily  enoxaparin Injectable 40 milliGRAM(s) SubCutaneous daily  insulin lispro (ADMELOG) corrective regimen sliding scale   SubCutaneous three times a day before meals  losartan 100 milliGRAM(s) Oral daily  metoprolol succinate ER 50 milliGRAM(s) Oral daily    MEDICATIONS  (PRN):      __________________________________________________  REVIEW OF SYSTEMS:    CONSTITUTIONAL: No fever,   EYES: no acute visual disturbances  NECK: No pain or stiffness  RESPIRATORY: No cough; No shortness of breath  CARDIOVASCULAR: No chest pain, no palpitations  GASTROINTESTINAL: No pain. No nausea or vomiting; No diarrhea   NEUROLOGICAL: No headache or numbness, no tremors  MUSCULOSKELETAL: No joint pain, no muscle pain  GENITOURINARY: no dysuria, no frequency, no hesitancy  PSYCHIATRY: no depression , no anxiety  ALL OTHER  ROS negative        Vital Signs Last 24 Hrs  T(C): 37 (12 Mar 2021 06:07), Max: 37.2 (11 Mar 2021 15:21)  T(F): 98.6 (12 Mar 2021 06:07), Max: 99 (11 Mar 2021 15:21)  HR: 50 (12 Mar 2021 06:07) (50 - 98)  BP: 144/60 (12 Mar 2021 06:07) (112/68 - 182/77)  BP(mean): --  RR: 18 (12 Mar 2021 06:07) (18 - 18)  SpO2: 100% (12 Mar 2021 06:07) (95% - 100%)    ________________________________________________  PHYSICAL EXAM:  GENERAL: NAD  HEENT: Normocephalic;  conjunctivae and sclerae clear; moist mucous membranes;   NECK : supple  CHEST/LUNG: Clear to auscultation bilaterally with good air entry   HEART: S1 S2  regular; no murmurs, gallops or rubs  ABDOMEN: Soft, Nontender, Nondistended; Bowel sounds present  EXTREMITIES: no cyanosis; no edema; no calf tenderness  SKIN: warm and dry; no rash  NERVOUS SYSTEM:  Awake and alert; Oriented  to place, person and time ; no new deficits    _________________________________________________  LABS:                        11.1   2.97  )-----------( 109      ( 12 Mar 2021 07:07 )             34.1     03-12    139  |  107  |  32<H>  ----------------------------<  111<H>  4.1   |  25  |  0.85    Ca    8.5      12 Mar 2021 07:07  Phos  4.9     03-12  Mg     2.1     03-12    TPro  6.5  /  Alb  3.2<L>  /  TBili  0.6  /  DBili  x   /  AST  22  /  ALT  28  /  AlkPhos  85  03-12    PT/INR - ( 11 Mar 2021 16:44 )   PT: 12.4 sec;   INR: 1.04 ratio         PTT - ( 11 Mar 2021 16:44 )  PTT:36.5 sec    CAPILLARY BLOOD GLUCOSE      POCT Blood Glucose.: 270 mg/dL (12 Mar 2021 11:22)  POCT Blood Glucose.: 111 mg/dL (12 Mar 2021 08:19)        RADIOLOGY & ADDITIONAL TESTS:    EXAM:  XR CHEST PORTABLE URGENT 1V                            PROCEDURE DATE:  03/11/2021          INTERPRETATION:  Portable chest x-ray    Indication: cough    Comparison: 4/3/2017    Portable chest x-ray is acquired for evaluation.    Impression: No evidence for acute pulmonary infiltrate, pleural effusion, or pneumothorax.    Stable cardiac silhouette.    No pulmonary vascular congestion.    Stable mild elevation of the right hemidiaphragm.    Degenerative joint disease of both shoulders.      Imaging  Reviewed:  YES/NO    Consultant(s) Notes Reviewed:   YES/ No      Plan of care was discussed with patient and /or primary care giver; all questions and concerns were addressed

## 2024-04-01 NOTE — ED PROVIDER NOTE - CPE EDP SKIN NORM
Quality 226: Preventive Care And Screening: Tobacco Use: Screening And Cessation Intervention: Patient screened for tobacco use, is a smoker AND did not receive Cessation Counseling during measurement period or in the six months prior to the measurement period
Detail Level: Zone
Quality 130: Documentation Of Current Medications In The Medical Record: Current Medications Documented
Quality 431: Preventive Care And Screening: Unhealthy Alcohol Use - Screening: Patient not identified as an unhealthy alcohol user when screened for unhealthy alcohol use using a systematic screening method
normal...

## 2024-05-21 ENCOUNTER — EMERGENCY (EMERGENCY)
Facility: HOSPITAL | Age: 89
LOS: 1 days | Discharge: HOME | End: 2024-05-21
Attending: STUDENT IN AN ORGANIZED HEALTH CARE EDUCATION/TRAINING PROGRAM
Payer: MEDICARE

## 2024-05-21 VITALS
RESPIRATION RATE: 18 BRPM | TEMPERATURE: 98 F | HEART RATE: 57 BPM | WEIGHT: 126.99 LBS | SYSTOLIC BLOOD PRESSURE: 163 MMHG | DIASTOLIC BLOOD PRESSURE: 73 MMHG | OXYGEN SATURATION: 97 %

## 2024-05-21 VITALS
RESPIRATION RATE: 18 BRPM | HEART RATE: 98 BPM | TEMPERATURE: 98 F | DIASTOLIC BLOOD PRESSURE: 73 MMHG | SYSTOLIC BLOOD PRESSURE: 150 MMHG | OXYGEN SATURATION: 98 %

## 2024-05-21 DIAGNOSIS — Z90.49 ACQUIRED ABSENCE OF OTHER SPECIFIED PARTS OF DIGESTIVE TRACT: Chronic | ICD-10-CM

## 2024-05-21 LAB
ALBUMIN SERPL ELPH-MCNC: 3.4 G/DL — LOW (ref 3.5–5)
ALP SERPL-CCNC: 93 U/L — SIGNIFICANT CHANGE UP (ref 40–120)
ALT FLD-CCNC: 30 U/L DA — SIGNIFICANT CHANGE UP (ref 10–60)
ANION GAP SERPL CALC-SCNC: 4 MMOL/L — LOW (ref 5–17)
APPEARANCE UR: ABNORMAL
APTT BLD: 36.5 SEC — HIGH (ref 24.5–35.6)
AST SERPL-CCNC: 24 U/L — SIGNIFICANT CHANGE UP (ref 10–40)
BASE EXCESS BLDV CALC-SCNC: 3.7 MMOL/L — SIGNIFICANT CHANGE UP
BASOPHILS # BLD AUTO: 0.02 K/UL — SIGNIFICANT CHANGE UP (ref 0–0.2)
BASOPHILS NFR BLD AUTO: 0.4 % — SIGNIFICANT CHANGE UP (ref 0–2)
BILIRUB DIRECT SERPL-MCNC: 0.3 MG/DL — SIGNIFICANT CHANGE UP (ref 0–0.3)
BILIRUB INDIRECT FLD-MCNC: 0.4 MG/DL — SIGNIFICANT CHANGE UP (ref 0.2–1)
BILIRUB SERPL-MCNC: 0.7 MG/DL — SIGNIFICANT CHANGE UP (ref 0.2–1.2)
BILIRUB UR-MCNC: NEGATIVE — SIGNIFICANT CHANGE UP
BLD GP AB SCN SERPL QL: SIGNIFICANT CHANGE UP
BUN SERPL-MCNC: 40 MG/DL — HIGH (ref 7–18)
CALCIUM SERPL-MCNC: 9 MG/DL — SIGNIFICANT CHANGE UP (ref 8.4–10.5)
CHLORIDE SERPL-SCNC: 107 MMOL/L — SIGNIFICANT CHANGE UP (ref 96–108)
CO2 SERPL-SCNC: 28 MMOL/L — SIGNIFICANT CHANGE UP (ref 22–31)
COLOR SPEC: ABNORMAL
CREAT SERPL-MCNC: 1.11 MG/DL — SIGNIFICANT CHANGE UP (ref 0.5–1.3)
DIFF PNL FLD: ABNORMAL
EGFR: 61 ML/MIN/1.73M2 — SIGNIFICANT CHANGE UP
EOSINOPHIL # BLD AUTO: 0.1 K/UL — SIGNIFICANT CHANGE UP (ref 0–0.5)
EOSINOPHIL NFR BLD AUTO: 2.1 % — SIGNIFICANT CHANGE UP (ref 0–6)
GLUCOSE SERPL-MCNC: 98 MG/DL — SIGNIFICANT CHANGE UP (ref 70–99)
GLUCOSE UR QL: NEGATIVE MG/DL — SIGNIFICANT CHANGE UP
HCO3 BLDV-SCNC: 30 MMOL/L — HIGH (ref 22–29)
HCT VFR BLD CALC: 40.3 % — SIGNIFICANT CHANGE UP (ref 39–50)
HGB BLD-MCNC: 13 G/DL — SIGNIFICANT CHANGE UP (ref 13–17)
IMM GRANULOCYTES NFR BLD AUTO: 0.4 % — SIGNIFICANT CHANGE UP (ref 0–0.9)
INR BLD: 1.05 RATIO — SIGNIFICANT CHANGE UP (ref 0.85–1.18)
KETONES UR-MCNC: ABNORMAL MG/DL
LEUKOCYTE ESTERASE UR-ACNC: ABNORMAL
LIDOCAIN IGE QN: 79 U/L — HIGH (ref 13–75)
LYMPHOCYTES # BLD AUTO: 1.08 K/UL — SIGNIFICANT CHANGE UP (ref 1–3.3)
LYMPHOCYTES # BLD AUTO: 23 % — SIGNIFICANT CHANGE UP (ref 13–44)
MCHC RBC-ENTMCNC: 29.2 PG — SIGNIFICANT CHANGE UP (ref 27–34)
MCHC RBC-ENTMCNC: 32.3 GM/DL — SIGNIFICANT CHANGE UP (ref 32–36)
MCV RBC AUTO: 90.6 FL — SIGNIFICANT CHANGE UP (ref 80–100)
MONOCYTES # BLD AUTO: 0.55 K/UL — SIGNIFICANT CHANGE UP (ref 0–0.9)
MONOCYTES NFR BLD AUTO: 11.7 % — SIGNIFICANT CHANGE UP (ref 2–14)
NEUTROPHILS # BLD AUTO: 2.92 K/UL — SIGNIFICANT CHANGE UP (ref 1.8–7.4)
NEUTROPHILS NFR BLD AUTO: 62.4 % — SIGNIFICANT CHANGE UP (ref 43–77)
NITRITE UR-MCNC: NEGATIVE — SIGNIFICANT CHANGE UP
NRBC # BLD: 0 /100 WBCS — SIGNIFICANT CHANGE UP (ref 0–0)
PCO2 BLDV: 49 MMHG — SIGNIFICANT CHANGE UP (ref 42–55)
PH BLDV: 7.39 — SIGNIFICANT CHANGE UP (ref 7.32–7.43)
PH UR: 6.5 — SIGNIFICANT CHANGE UP (ref 5–8)
PLATELET # BLD AUTO: 153 K/UL — SIGNIFICANT CHANGE UP (ref 150–400)
PO2 BLDV: 37 MMHG — SIGNIFICANT CHANGE UP
POTASSIUM SERPL-MCNC: 4.6 MMOL/L — SIGNIFICANT CHANGE UP (ref 3.5–5.3)
POTASSIUM SERPL-SCNC: 4.6 MMOL/L — SIGNIFICANT CHANGE UP (ref 3.5–5.3)
PROT SERPL-MCNC: 7.5 G/DL — SIGNIFICANT CHANGE UP (ref 6–8.3)
PROT UR-MCNC: 300 MG/DL
PROTHROM AB SERPL-ACNC: 11.9 SEC — SIGNIFICANT CHANGE UP (ref 9.5–13)
RBC # BLD: 4.45 M/UL — SIGNIFICANT CHANGE UP (ref 4.2–5.8)
RBC # FLD: 14 % — SIGNIFICANT CHANGE UP (ref 10.3–14.5)
RBC CASTS # UR COMP ASSIST: ABNORMAL /HPF
SAO2 % BLDV: 64.5 % — SIGNIFICANT CHANGE UP
SODIUM SERPL-SCNC: 139 MMOL/L — SIGNIFICANT CHANGE UP (ref 135–145)
SP GR SPEC: 1.01 — SIGNIFICANT CHANGE UP (ref 1–1.03)
UROBILINOGEN FLD QL: 1 MG/DL — SIGNIFICANT CHANGE UP (ref 0.2–1)
WBC # BLD: 4.69 K/UL — SIGNIFICANT CHANGE UP (ref 3.8–10.5)
WBC # FLD AUTO: 4.69 K/UL — SIGNIFICANT CHANGE UP (ref 3.8–10.5)
WBC UR QL: 15 /HPF — HIGH (ref 0–5)

## 2024-05-21 PROCEDURE — 87086 URINE CULTURE/COLONY COUNT: CPT

## 2024-05-21 PROCEDURE — 80076 HEPATIC FUNCTION PANEL: CPT

## 2024-05-21 PROCEDURE — 74176 CT ABD & PELVIS W/O CONTRAST: CPT | Mod: MC

## 2024-05-21 PROCEDURE — 85730 THROMBOPLASTIN TIME PARTIAL: CPT

## 2024-05-21 PROCEDURE — 86850 RBC ANTIBODY SCREEN: CPT

## 2024-05-21 PROCEDURE — 86900 BLOOD TYPING SEROLOGIC ABO: CPT

## 2024-05-21 PROCEDURE — 87040 BLOOD CULTURE FOR BACTERIA: CPT

## 2024-05-21 PROCEDURE — 81001 URINALYSIS AUTO W/SCOPE: CPT

## 2024-05-21 PROCEDURE — 85610 PROTHROMBIN TIME: CPT

## 2024-05-21 PROCEDURE — 82803 BLOOD GASES ANY COMBINATION: CPT

## 2024-05-21 PROCEDURE — 36415 COLL VENOUS BLD VENIPUNCTURE: CPT

## 2024-05-21 PROCEDURE — 74176 CT ABD & PELVIS W/O CONTRAST: CPT | Mod: 26,MC

## 2024-05-21 PROCEDURE — 99285 EMERGENCY DEPT VISIT HI MDM: CPT

## 2024-05-21 PROCEDURE — 83690 ASSAY OF LIPASE: CPT

## 2024-05-21 PROCEDURE — 86901 BLOOD TYPING SEROLOGIC RH(D): CPT

## 2024-05-21 PROCEDURE — 99284 EMERGENCY DEPT VISIT MOD MDM: CPT | Mod: 25

## 2024-05-21 PROCEDURE — 85025 COMPLETE CBC W/AUTO DIFF WBC: CPT

## 2024-05-21 PROCEDURE — 80048 BASIC METABOLIC PNL TOTAL CA: CPT

## 2024-05-21 RX ORDER — SODIUM CHLORIDE 9 MG/ML
1000 INJECTION INTRAMUSCULAR; INTRAVENOUS; SUBCUTANEOUS ONCE
Refills: 0 | Status: COMPLETED | OUTPATIENT
Start: 2024-05-21 | End: 2024-05-21

## 2024-05-21 RX ADMIN — SODIUM CHLORIDE 1000 MILLILITER(S): 9 INJECTION INTRAMUSCULAR; INTRAVENOUS; SUBCUTANEOUS at 09:25

## 2024-05-21 NOTE — ED PROVIDER NOTE - CLINICAL SUMMARY MEDICAL DECISION MAKING FREE TEXT BOX
After introducing myself to the patient and/or family I have performed a medical history, review of systems, and physical examination. I have formulated a differential diagnosis and plan of care for this visit and discussed this with the patient and/or family. I have also addressed the risks and benefits of diagnostic and treatment modalities planned for this visit.  Vital Signs: Reviewed the patient's vital signs  Nursing Notes: Reviewed and utilized the nursing notes  Old Medical Records: The patient's available past medical records and past encounters were reviewed  Laboratory Studies: Ordered and independently interpreted laboratory test, see above  Imaging Studies: Imaging studies ordered. Radiologist interpretation reviewed. I have independently reviewed imaging.    Patient presenting with symptoms consistent with hematuria. No evidence for UTI, pyelonephritis, nephrolithiasis, traumatic injury, other significant pathology.     Unclear source, patient refusing Chavez. Instructed he will need cystoscopy. Labs/imaging in ED unremarkable.   Patient provided with urgent urology referral, and encouraged to return if he worsens or changes decision.

## 2024-05-21 NOTE — ED ADULT NURSE REASSESSMENT NOTE - NS ED NURSE REASSESS COMMENT FT1
LOAN Armando attempted to do Chavez, but pt also refused with LOAN Armando MD made aware of this matter.

## 2024-05-21 NOTE — ED ADULT NURSE NOTE - NSFALLUNIVINTERV_ED_ALL_ED
Bed/Stretcher in lowest position, wheels locked, appropriate side rails in place/Call bell, personal items and telephone in reach/Instruct patient to call for assistance before getting out of bed/chair/stretcher/Non-slip footwear applied when patient is off stretcher/Pax to call system/Physically safe environment - no spills, clutter or unnecessary equipment/Purposeful proactive rounding/Room/bathroom lighting operational, light cord in reach

## 2024-05-21 NOTE — ED PROVIDER NOTE - NSFOLLOWUPINSTRUCTIONS_ED_ALL_ED_FT
Blood in the urine, or hematuria, may make the urine look red, brown, or pink. There may be blood every time you urinate or just from time to time. You cannot always see blood in the urine, but it will show up in a urine test.    Blood in the urine may be serious. It should always be checked by a doctor. Your doctor may recommend more tests, including an X-ray, a CT scan, or a cystoscopy (which lets a doctor look inside the urethra and bladder).    Blood in the urine can be a sign of another problem. Common causes are bladder infections and kidney stones. An injury to your groin or your genital area can also cause bleeding in the urinary tract. Very hard exercise—such as running a marathon—can cause blood in the urine. Blood in the urine can also be a sign of kidney disease or cancer in the bladder or kidney. Many cases of blood in the urine are caused by a harmless condition that runs in families. This is called benign familial hematuria. It does not need any treatment.    Sometimes your urine may look red or brown even though it does not contain blood. For example, not getting enough fluids (dehydration), taking certain medicines, or having a liver problem can change the colour of your urine. Eating foods such as beets, rhubarb, or blackberries or foods with red food colouring can make your urine look red or pink.

## 2024-05-21 NOTE — ED ADULT NURSE NOTE - OBJECTIVE STATEMENT
pt is here for hematuria.  pt stated that hematuria x since yesterday, denied pain or burning sensation, taking anticoagulants, buries to b/l arm, denied chest pain or sob, difficulty to hear,

## 2024-05-21 NOTE — ED ADULT TRIAGE NOTE - CHIEF COMPLAINT QUOTE
hematuria x yesterday, denies any pain/ discomfort, no abdominal distention/ tenderness, pt on Plavix

## 2024-05-21 NOTE — ED PROVIDER NOTE - OBJECTIVE STATEMENT
89 F presents to ED reporting blood in urine. 89-year-old male presents to the ED reporting hematuria.  Patient reports he felt well and went to sleep last night.  Reports waking up with blood coming from his penis that he states has been persistent throughout the night.  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  Patient has no complaints other than seeing the blood and being concerned.    GENERAL APPEARANCE:  AAOx4, generally well-appearing, no acute distress.  HEENT:  NCAT. Moist mucous membranes. EOMI, clear conjunctiva, oropharynx clear.  NECK:  Supple without lymphadenopathy. No JVD.  No neck stiffness or restricted ROM.  HEART:  Normal heart rate and regular rhythm. No murmur.   LUNGS:  CTAB, moving air well. No crackles or wheezes are heard.  ABDOMEN:  Soft, nontender, non-distended. Negative Corrales. Negative McBurney. No rebound or guarding.  CHEST/BACK: Chest wall non-tender. No CVAT, or midline cervical/thoracic/lumbar tenderness to palpation. No obvious deformity of chest wall or back.  EXTREMITIES:  Without cyanosis, clubbing or edema. Pulse intact x 4. FROM x4. Compartments soft in all extremities.  GENITOURINARY: Blood at meatus.   NEUROLOGICAL:  Grossly non-focal. Alert and oriented, moving all 4 extremities. CN II-XII grossly intact. Observed to ambulate with normal gait.  SKIN:  Warm and dry without any rash.  PSYCH: Calm, cooperative. Demonstrates proper insight and judgement.

## 2024-05-21 NOTE — ED PROVIDER NOTE - PATIENT PORTAL LINK FT
You can access the FollowMyHealth Patient Portal offered by [facility] by registering at the following website: [email or web address]. By joining [facility]’s FollowMyHealth portal, you will also be able to view your health information using other applications (apps) compatible with our system.

## 2024-05-21 NOTE — ED PROVIDER NOTE - PROGRESS NOTE DETAILS
Discussed case with urology who recommended Chavez catheter placement to prevent urinary obstruction.  Patient is refusing Chavez catheter.  Requesting for his sheets and clothing to be changed but adamantly refusing Chavez catheter placement.  Explained to patient this may cause an obstruction and inability urinate states he does not want a Chavez. Patient's refusing Chavez continuously.  Called family to try and encourage them to speak with patient regarding placement of Chavez catheter due to persistent hematuria.  Family spoke with patient patient still adamant that he does not want a Chavez catheter.  Patient is alert and oriented without any concern about insight or judgment however still refusing Chavez catheter.  Will request prompt urology referral and instructed the patient to come back if symptoms worsen or he develops inability urinate.

## 2024-05-21 NOTE — ED PROVIDER NOTE - NSICDXPASTMEDICALHX_GEN_ALL_CORE_FT
PAST MEDICAL HISTORY:  CAD (coronary artery disease)     DM (diabetes mellitus)     Stented coronary artery      20

## 2024-05-23 LAB
CULTURE RESULTS: SIGNIFICANT CHANGE UP
SPECIMEN SOURCE: SIGNIFICANT CHANGE UP

## 2024-06-28 PROCEDURE — L9981: CPT

## 2024-06-29 ENCOUNTER — TRANSCRIPTION ENCOUNTER (OUTPATIENT)
Age: 89
End: 2024-06-29

## 2024-06-29 PROCEDURE — 83036 HEMOGLOBIN GLYCOSYLATED A1C: CPT

## 2024-06-29 PROCEDURE — 74018 RADEX ABDOMEN 1 VIEW: CPT

## 2024-06-29 PROCEDURE — 82962 GLUCOSE BLOOD TEST: CPT

## 2024-06-29 PROCEDURE — 83735 ASSAY OF MAGNESIUM: CPT

## 2024-06-29 PROCEDURE — 80048 BASIC METABOLIC PNL TOTAL CA: CPT

## 2024-06-29 PROCEDURE — 85025 COMPLETE CBC W/AUTO DIFF WBC: CPT

## 2024-06-29 PROCEDURE — 85027 COMPLETE CBC AUTOMATED: CPT

## 2024-06-29 PROCEDURE — 36415 COLL VENOUS BLD VENIPUNCTURE: CPT

## 2024-06-29 PROCEDURE — 99283 EMERGENCY DEPT VISIT LOW MDM: CPT | Mod: 25

## 2024-06-29 PROCEDURE — 80053 COMPREHEN METABOLIC PANEL: CPT

## 2024-06-29 PROCEDURE — 84100 ASSAY OF PHOSPHORUS: CPT

## 2024-06-29 RX ORDER — ASPIRIN/CALCIUM CARB/MAGNESIUM 324 MG
1 TABLET ORAL
Qty: 0 | Refills: 0 | DISCHARGE

## 2024-06-29 RX ORDER — ATORVASTATIN CALCIUM 80 MG/1
1 TABLET, FILM COATED ORAL
Qty: 0 | Refills: 0 | DISCHARGE

## 2024-06-29 RX ORDER — SITAGLIPTIN 50 MG/1
1 TABLET, FILM COATED ORAL
Qty: 0 | Refills: 0 | DISCHARGE

## 2024-06-29 RX ORDER — PIOGLITAZONE HYDROCHLORIDE 15 MG/1
1 TABLET ORAL
Qty: 0 | Refills: 0 | DISCHARGE

## 2024-06-29 RX ORDER — TELMISARTAN 20 MG/1
1 TABLET ORAL
Qty: 0 | Refills: 0 | DISCHARGE

## 2024-06-29 RX ORDER — CLOPIDOGREL BISULFATE 75 MG/1
1 TABLET, FILM COATED ORAL
Qty: 0 | Refills: 0 | DISCHARGE

## 2024-07-02 ENCOUNTER — EMERGENCY (EMERGENCY)
Facility: HOSPITAL | Age: 89
LOS: 1 days | Discharge: HOME | End: 2024-07-02
Admitting: EMERGENCY MEDICINE
Payer: MEDICARE

## 2024-07-08 ENCOUNTER — INPATIENT (INPATIENT)
Facility: HOSPITAL | Age: 89
LOS: 6 days | Discharge: NURSING, REHAB OR LONG-TERM CARE | DRG: 699 | End: 2024-07-15
Attending: STUDENT IN AN ORGANIZED HEALTH CARE EDUCATION/TRAINING PROGRAM | Admitting: STUDENT IN AN ORGANIZED HEALTH CARE EDUCATION/TRAINING PROGRAM
Payer: MEDICARE

## 2024-07-08 VITALS
RESPIRATION RATE: 18 BRPM | HEART RATE: 61 BPM | TEMPERATURE: 98 F | OXYGEN SATURATION: 97 % | DIASTOLIC BLOOD PRESSURE: 73 MMHG | SYSTOLIC BLOOD PRESSURE: 142 MMHG

## 2024-07-08 DIAGNOSIS — N17.9 ACUTE KIDNEY FAILURE, UNSPECIFIED: ICD-10-CM

## 2024-07-08 DIAGNOSIS — Z90.49 ACQUIRED ABSENCE OF OTHER SPECIFIED PARTS OF DIGESTIVE TRACT: Chronic | ICD-10-CM

## 2024-07-08 DIAGNOSIS — R31.9 HEMATURIA, UNSPECIFIED: ICD-10-CM

## 2024-07-08 DIAGNOSIS — Z29.9 ENCOUNTER FOR PROPHYLACTIC MEASURES, UNSPECIFIED: ICD-10-CM

## 2024-07-08 DIAGNOSIS — F03.90 UNSPECIFIED DEMENTIA, UNSPECIFIED SEVERITY, WITHOUT BEHAVIORAL DISTURBANCE, PSYCHOTIC DISTURBANCE, MOOD DISTURBANCE, AND ANXIETY: ICD-10-CM

## 2024-07-08 LAB
ALBUMIN SERPL ELPH-MCNC: 3.2 G/DL — LOW (ref 3.5–5)
ALP SERPL-CCNC: 72 U/L — SIGNIFICANT CHANGE UP (ref 40–120)
ALT FLD-CCNC: 32 U/L DA — SIGNIFICANT CHANGE UP (ref 10–60)
ANION GAP SERPL CALC-SCNC: 6 MMOL/L — SIGNIFICANT CHANGE UP (ref 5–17)
APTT BLD: 33.3 SEC — SIGNIFICANT CHANGE UP (ref 24.5–35.6)
AST SERPL-CCNC: 26 U/L — SIGNIFICANT CHANGE UP (ref 10–40)
BASOPHILS # BLD AUTO: 0.02 K/UL — SIGNIFICANT CHANGE UP (ref 0–0.2)
BASOPHILS NFR BLD AUTO: 0.3 % — SIGNIFICANT CHANGE UP (ref 0–2)
BILIRUB SERPL-MCNC: 0.6 MG/DL — SIGNIFICANT CHANGE UP (ref 0.2–1.2)
BUN SERPL-MCNC: 41 MG/DL — HIGH (ref 7–18)
CALCIUM SERPL-MCNC: 8.5 MG/DL — SIGNIFICANT CHANGE UP (ref 8.4–10.5)
CHLORIDE SERPL-SCNC: 109 MMOL/L — HIGH (ref 96–108)
CO2 SERPL-SCNC: 27 MMOL/L — SIGNIFICANT CHANGE UP (ref 22–31)
CREAT SERPL-MCNC: 1.44 MG/DL — HIGH (ref 0.5–1.3)
EGFR: 44 ML/MIN/1.73M2 — LOW
EOSINOPHIL # BLD AUTO: 0.04 K/UL — SIGNIFICANT CHANGE UP (ref 0–0.5)
EOSINOPHIL NFR BLD AUTO: 0.5 % — SIGNIFICANT CHANGE UP (ref 0–6)
GLUCOSE SERPL-MCNC: 141 MG/DL — HIGH (ref 70–99)
HCT VFR BLD CALC: 27.3 % — LOW (ref 39–50)
HCT VFR BLD CALC: 30 % — LOW (ref 39–50)
HCT VFR BLD CALC: 35.6 % — LOW (ref 39–50)
HGB BLD-MCNC: 11.1 G/DL — LOW (ref 13–17)
HGB BLD-MCNC: 8.7 G/DL — LOW (ref 13–17)
HGB BLD-MCNC: 9.6 G/DL — LOW (ref 13–17)
IMM GRANULOCYTES NFR BLD AUTO: 0.4 % — SIGNIFICANT CHANGE UP (ref 0–0.9)
INR BLD: 1.03 RATIO — SIGNIFICANT CHANGE UP (ref 0.85–1.18)
LIDOCAIN IGE QN: 159 U/L — HIGH (ref 13–75)
LYMPHOCYTES # BLD AUTO: 0.8 K/UL — LOW (ref 1–3.3)
LYMPHOCYTES # BLD AUTO: 10.3 % — LOW (ref 13–44)
MCHC RBC-ENTMCNC: 27.8 PG — SIGNIFICANT CHANGE UP (ref 27–34)
MCHC RBC-ENTMCNC: 28.4 PG — SIGNIFICANT CHANGE UP (ref 27–34)
MCHC RBC-ENTMCNC: 29.1 PG — SIGNIFICANT CHANGE UP (ref 27–34)
MCHC RBC-ENTMCNC: 31.2 GM/DL — LOW (ref 32–36)
MCHC RBC-ENTMCNC: 31.9 GM/DL — LOW (ref 32–36)
MCHC RBC-ENTMCNC: 32 GM/DL — SIGNIFICANT CHANGE UP (ref 32–36)
MCV RBC AUTO: 88.8 FL — SIGNIFICANT CHANGE UP (ref 80–100)
MCV RBC AUTO: 89.2 FL — SIGNIFICANT CHANGE UP (ref 80–100)
MCV RBC AUTO: 91.3 FL — SIGNIFICANT CHANGE UP (ref 80–100)
MONOCYTES # BLD AUTO: 0.64 K/UL — SIGNIFICANT CHANGE UP (ref 0–0.9)
MONOCYTES NFR BLD AUTO: 8.2 % — SIGNIFICANT CHANGE UP (ref 2–14)
NEUTROPHILS # BLD AUTO: 6.26 K/UL — SIGNIFICANT CHANGE UP (ref 1.8–7.4)
NEUTROPHILS NFR BLD AUTO: 80.3 % — HIGH (ref 43–77)
NRBC # BLD: 0 /100 WBCS — SIGNIFICANT CHANGE UP (ref 0–0)
PLATELET # BLD AUTO: 148 K/UL — LOW (ref 150–400)
PLATELET # BLD AUTO: 174 K/UL — SIGNIFICANT CHANGE UP (ref 150–400)
PLATELET # BLD AUTO: 202 K/UL — SIGNIFICANT CHANGE UP (ref 150–400)
POTASSIUM SERPL-MCNC: 4.5 MMOL/L — SIGNIFICANT CHANGE UP (ref 3.5–5.3)
POTASSIUM SERPL-SCNC: 4.5 MMOL/L — SIGNIFICANT CHANGE UP (ref 3.5–5.3)
PROT SERPL-MCNC: 7 G/DL — SIGNIFICANT CHANGE UP (ref 6–8.3)
PROTHROM AB SERPL-ACNC: 11.7 SEC — SIGNIFICANT CHANGE UP (ref 9.5–13)
RBC # BLD: 2.99 M/UL — LOW (ref 4.2–5.8)
RBC # BLD: 3.38 M/UL — LOW (ref 4.2–5.8)
RBC # BLD: 3.99 M/UL — LOW (ref 4.2–5.8)
RBC # FLD: 14 % — SIGNIFICANT CHANGE UP (ref 10.3–14.5)
RBC # FLD: 14.2 % — SIGNIFICANT CHANGE UP (ref 10.3–14.5)
RBC # FLD: 14.3 % — SIGNIFICANT CHANGE UP (ref 10.3–14.5)
SODIUM SERPL-SCNC: 142 MMOL/L — SIGNIFICANT CHANGE UP (ref 135–145)
WBC # BLD: 5.38 K/UL — SIGNIFICANT CHANGE UP (ref 3.8–10.5)
WBC # BLD: 7.21 K/UL — SIGNIFICANT CHANGE UP (ref 3.8–10.5)
WBC # BLD: 7.79 K/UL — SIGNIFICANT CHANGE UP (ref 3.8–10.5)
WBC # FLD AUTO: 5.38 K/UL — SIGNIFICANT CHANGE UP (ref 3.8–10.5)
WBC # FLD AUTO: 7.21 K/UL — SIGNIFICANT CHANGE UP (ref 3.8–10.5)
WBC # FLD AUTO: 7.79 K/UL — SIGNIFICANT CHANGE UP (ref 3.8–10.5)

## 2024-07-08 PROCEDURE — 71045 X-RAY EXAM CHEST 1 VIEW: CPT | Mod: 26

## 2024-07-08 PROCEDURE — 99223 1ST HOSP IP/OBS HIGH 75: CPT

## 2024-07-08 PROCEDURE — 99223 1ST HOSP IP/OBS HIGH 75: CPT | Mod: GC

## 2024-07-08 PROCEDURE — 99285 EMERGENCY DEPT VISIT HI MDM: CPT

## 2024-07-08 PROCEDURE — 93010 ELECTROCARDIOGRAM REPORT: CPT

## 2024-07-08 RX ORDER — ATORVASTATIN CALCIUM 20 MG/1
10 TABLET, FILM COATED ORAL AT BEDTIME
Refills: 0 | Status: DISCONTINUED | OUTPATIENT
Start: 2024-07-08 | End: 2024-07-08

## 2024-07-08 RX ORDER — ACETAMINOPHEN 325 MG
650 TABLET ORAL EVERY 6 HOURS
Refills: 0 | Status: DISCONTINUED | OUTPATIENT
Start: 2024-07-08 | End: 2024-07-15

## 2024-07-08 RX ORDER — LORAZEPAM 0.5 MG
0.5 TABLET ORAL ONCE
Refills: 0 | Status: DISCONTINUED | OUTPATIENT
Start: 2024-07-08 | End: 2024-07-08

## 2024-07-08 RX ORDER — DEXTROSE MONOHYDRATE AND SODIUM CHLORIDE 5; .3 G/100ML; G/100ML
1000 INJECTION, SOLUTION INTRAVENOUS
Refills: 0 | Status: DISCONTINUED | OUTPATIENT
Start: 2024-07-08 | End: 2024-07-08

## 2024-07-08 RX ORDER — HALOPERIDOL DECANOATE 100 MG/ML
3 VIAL (ML) INTRAMUSCULAR ONCE
Refills: 0 | Status: COMPLETED | OUTPATIENT
Start: 2024-07-08 | End: 2024-07-08

## 2024-07-08 RX ORDER — HEPARIN SODIUM 50 [USP'U]/ML
5000 INJECTION, SOLUTION INTRAVENOUS EVERY 12 HOURS
Refills: 0 | Status: DISCONTINUED | OUTPATIENT
Start: 2024-07-08 | End: 2024-07-08

## 2024-07-08 RX ORDER — DEXTROSE MONOHYDRATE AND SODIUM CHLORIDE 5; .3 G/100ML; G/100ML
1000 INJECTION, SOLUTION INTRAVENOUS
Refills: 0 | Status: DISCONTINUED | OUTPATIENT
Start: 2024-07-08 | End: 2024-07-13

## 2024-07-08 RX ORDER — ATORVASTATIN CALCIUM 20 MG/1
10 TABLET, FILM COATED ORAL AT BEDTIME
Refills: 0 | Status: DISCONTINUED | OUTPATIENT
Start: 2024-07-08 | End: 2024-07-15

## 2024-07-08 RX ADMIN — Medication 3 MILLIGRAM(S): at 05:21

## 2024-07-08 RX ADMIN — Medication 0.5 MILLIGRAM(S): at 05:20

## 2024-07-08 RX ADMIN — DEXTROSE MONOHYDRATE AND SODIUM CHLORIDE 75 MILLILITER(S): 5; .3 INJECTION, SOLUTION INTRAVENOUS at 12:49

## 2024-07-08 NOTE — H&P ADULT - PROBLEM SELECTOR PLAN 1
p/w hematuria  hemodynamically stable  PE: AAOx 0-1, no penile lesion  Bladder scan 1665 cc of urine   no leukocytosis   PE: AAOx0-1  - f/u UA  - f/u Ucx   - Urology team consulted > recommended becerril placement p/w hematuria  hemodynamically stable  PE: AAOx 0-1, no penile lesion  Bladder scan 1665 cc of urine   no leukocytosis   PE: AAOx1-2  - f/u UA  - f/u Ucx   - Urology team consulted > recommended becerril placement p/w hematuria  hemodynamically stable  PE: AAOx 0-1, no penile lesion  Bladder scan 120cc of urine   no leukocytosis   PE: AAOx1-2, blood noted around the urinary meatus.   - f/u UA  - f/u Ucx   - Urology team consulted > recommended becerril placement p/w hematuria  hemodynamically stable  PE: AAOx 0-1, no penile lesion  Bladder scan 120cc of urine   no leukocytosis   PE: AAOx1-2, blood noted around the urinary meatus.   - f/u UA  - f/u Ucx   - CBC Q4hrs   - Urology team consulted > recommended becerril placement p/w hematuria  hemodynamically stable  PE: AAOx 0-1, no penile lesion  Bladder scan 120cc of urine   no leukocytosis   PE: AAOx1-2, blood noted around the urinary meatus.   - f/u UA  - f/u Ucx   - CBC Q2hrs   - Urology team consulted > recommended becerril placement

## 2024-07-08 NOTE — CONSULT NOTE ADULT - SUBJECTIVE AND OBJECTIVE BOX
Patient is a 89y old  Male who presents with a chief complaint of     HPI:  Called see and eval 89y.o. Male w/PMH as below for bleeding from urethral meatus. Pt BIBA from home for bleeding for 1 day. Pt was in [facility] in May also with hematuria. Refused becerril catheter, but hematuria resolved spontaneously. CT on last admission showed poss clot in bladder. Pt never followed up with urology. Pt poor historian, agitated about becerril catheter placement. Pt again currently refusing becerril catheter.     PAST MEDICAL & SURGICAL HISTORY:  DM (diabetes mellitus)      CAD (coronary artery disease)      Stented coronary artery      S/P appendectomy    MEDICATIONS  (STANDING):    MEDICATIONS  (PRN):      Allergies    penicillin (Rash)    Intolerances    Vital Signs Last 24 Hrs  T(C): 36.4 (08 Jul 2024 01:38), Max: 36.4 (08 Jul 2024 01:38)  T(F): 97.6 (08 Jul 2024 01:38), Max: 97.6 (08 Jul 2024 01:38)  HR: 64 (08 Jul 2024 06:33) (61 - 64)  BP: 135/74 (08 Jul 2024 06:33) (135/74 - 142/73)  BP(mean): --  RR: 18 (08 Jul 2024 06:33) (18 - 18)  SpO2: 95% (08 Jul 2024 06:33) (95% - 97%)    Parameters below as of 08 Jul 2024 06:33  Patient On (Oxygen Delivery Method): room air    Physical:  Gen: A&Ox2. NAD  Abd: Soft ND, mild suprapubic tenderness  Pelvis: Circumcised penis without lesions. Scrotum soft, testicles nontender b/l    LABS:                        11.1   7.79  )-----------( 202      ( 08 Jul 2024 02:30 )             35.6              07-08    142  |  109<H>  |  41<H>  ----------------------------<  141<H>  4.5   |  27  |  1.44<H>    Ca    8.5      08 Jul 2024 02:30    TPro  7.0  /  Alb  3.2<L>  /  TBili  0.6  /  DBili  x   /  AST  26  /  ALT  32  /  AlkPhos  72  07-08            PT/INR - ( 08 Jul 2024 02:30 )   PT: 11.7 sec;   INR: 1.03 ratio         PTT - ( 08 Jul 2024 02:30 )  PTT:33.3 sec  Urinalysis Basic - ( 08 Jul 2024 02:30 )    Color: x / Appearance: x / SG: x / pH: x  Gluc: 141 mg/dL / Ketone: x  / Bili: x / Urobili: x   Blood: x / Protein: x / Nitrite: x   Leuk Esterase: x / RBC: x / WBC x   Sq Epi: x / Non Sq Epi: x / Bacteria: x    RADIOLOGY & ADDITIONAL STUDIES:  < from: CT Abdomen and Pelvis No Cont (05.21.24 @ 11:22) >  KIDNEYS/URETERS: Mild bilateral hydronephrosis and hydroureterto the   level of the bladder without urinary tract calculi.    BLADDER: Diffuse bladder wall thickening and trabeculation. Layering   hyperdensity in the lumen, likely blood products.  REPRODUCTIVE ORGANS: Enlarged prostate.    < end of copied text >

## 2024-07-08 NOTE — CONSULT NOTE ADULT - ASSESSMENT
89y.o. Male with bleeding from urethral meatus    -Recommend becerril catheter  -Pt currently refusing  -Please call urology when pt more compliant  -Trend H/H

## 2024-07-08 NOTE — H&P ADULT - ASSESSMENT
5 yo M with unknown PMH  present with hematuria, As per ED note, patient noticed blood from urinary meatus since yesterday afternoon.  Patient states had similar episode in May and did not follow-up with urologist. In the ED, /73, HR 61, temp 97.6, Sat 97%. On physical examination AAOx0-1, no penile lesion. Labs shows no leukocytosis, Hgb 11, Cr 1.44 ( baseline 1.13 on 6/29/24), Lipase 159. CXR negative for infiltrates. Patient received  Haldol 3mg and Ativan 0.5mg in the ED. Patient admitted to medicine for Hematuria.  7 yo M with unknown PMH  present with hematuria, As per ED note, patient noticed blood from urinary meatus since yesterday afternoon.  Patient states had similar episode in May and did not follow-up with urologist. In the ED, /73, HR 61, temp 97.6, Sat 97%. On physical examination AAOx1-2, no penile lesion. Labs shows no leukocytosis, Hgb 11, Cr 1.44 ( baseline 1.13 on 6/29/24), Lipase 159. CXR negative for infiltrates. Patient received  Haldol 3mg and Ativan 0.5mg in the ED. Patient admitted to medicine for Hematuria.  7 yo M with unknown PMH  present with hematuria, As per ED note, patient noticed blood from urinary meatus since yesterday afternoon.  Patient states had similar episode in May and did not follow-up with urologist. In the ED, /73, HR 61, temp 97.6, Sat 97%. On physical examination AAOx1-2, no penile lesion. Labs shows no leukocytosis, Hgb 11, Cr 1.44 ( baseline 1.13 on 6/29/24), Lipase 159. CXR negative for infiltrates. Patient received  Haldol 3mg and Ativan 0.5mg in the ED. Patient admitted to medicine for Hematuria.     Addendum   5 yo M with PMH of CAD s/p stent   present with hematuria, As per ED note, patient noticed blood from urinary meatus since yesterday afternoon.  Patient states had similar episode in May and did not follow-up with urologist. In the ED, /73, HR 61, temp 97.6, Sat 97%. On physical examination AAOx1-2,  Blood noted around the urinary meatus, no penile lesion. Labs shows no leukocytosis, Hgb 11, Cr 1.44 ( baseline 1.13 on 6/29/24), Lipase 159. CXR negative for infiltrates. Patient received  Haldol 3mg and Ativan 0.5mg in the ED. Patient admitted to medicine for Hematuria.

## 2024-07-08 NOTE — ED ADULT NURSE NOTE - NSFALLUNIVINTERV_ED_ALL_ED
Bed/Stretcher in lowest position, wheels locked, appropriate side rails in place/Call bell, personal items and telephone in reach/Instruct patient to call for assistance before getting out of bed/chair/stretcher/Non-slip footwear applied when patient is off stretcher/[place] to call system/Physically safe environment - no spills, clutter or unnecessary equipment/Purposeful proactive rounding/Room/bathroom lighting operational, light cord in reach

## 2024-07-08 NOTE — H&P ADULT - HISTORY OF PRESENT ILLNESS
89 yo M with unknown PMH  present with hematuria, As per ED note, patient noticed blood from urinary meatus since yesterday afternoon.  Patient states had similar episode in May and did not follow-up with urologist.  No reported fever, no vomiting. patient with bladder scan demonstrating 166 5 cc of urine. Urology team was consulted and  recommended Chavez catheter placement. Patient refused and stated   he does not want anything inserted into his penis. As per the ED note, patient was  uncooperative, agitated and screaming in ED at approx 5am.  Patient  was given  Haldol and Ativan to relieve agitation. Upon evaluation this morning, patient is AAOx 0-1 and unable to provide further information.    Multiple attempts were made to obtain collateral information from emergency contact Stefanie Carrasquillo ([phone]) but no response.    89 yo M with unknown PMH  present with hematuria, As per ED note, patient noticed blood from urinary meatus since yesterday afternoon.  Patient states had similar episode in May and did not follow-up with urologist.  No reported fever, no vomiting. patient with bladder scan demonstrating 166 5 cc of urine. Urology team was consulted and  recommended Chavez catheter placement. Patient refused and stated   he does not want anything inserted into his penis. As per the ED note, patient was  uncooperative, agitated and screaming in ED at approx 5am.  Patient  was given  Haldol and Ativan to relieve agitation. Upon evaluation this morning, patient is AAOx 1-2 and unable to provide further information.    Multiple attempts were made to obtain collateral information from emergency contact Stefanie Carrasquillo ([phone]) but no response.    89 yo M with unknown PMH  present with hematuria, As per ED note, patient noticed blood from urinary meatus since yesterday afternoon.  Patient states had similar episode in May and did not follow-up with urologist.  No reported fever, no vomiting. patient with bladder scan demonstrating 166 5 cc of urine. Urology team was consulted and  recommended Chavez catheter placement. Patient refused and stated   he does not want anything inserted into his penis. As per the ED note, patient was  uncooperative, agitated and screaming in ED at approx 5am.  Patient  was given  Haldol and Ativan to relieve agitation. Upon evaluation this morning, patient is AAOx 1-2 and unable to provide further information.    Collateral information from emergency contact Stefanie Carrasquillo ([phone]) 89 yo M with unknown PMH  present with hematuria, As per ED note, patient noticed blood from urinary meatus since yesterday afternoon.  Patient states had similar episode in May and did not follow-up with urologist.   Urology team was consulted and  recommended Chavez catheter placement. Patient refused and stated   he does not want anything inserted into his penis. As per the ED note, at approx. 5am, patient was  uncooperative, agitated and screaming in ED.  Patient  was given  Haldol and Ativan to relieve agitation. Upon evaluation this morning, patient is AAOx 1-2 and unable to provide further information.    Collateral information from emergency contact Stefanie Carrasquillo ([phone]) was obtained. As per Stefanie she is unaware of the patient medical history and current medication    89 yo M PMH of CAD s/p stent   present with hematuria, As per ED note, patient noticed blood from urinary meatus since yesterday afternoon.  Patient states had similar episode in May and did not follow-up with urologist.   Urology team was consulted and  recommended Chavez catheter placement. Patient refused and stated   he does not want anything inserted into his penis. As per the ED note, at approx. 5am, patient was  uncooperative, agitated and screaming in ED.  Patient  was given  Haldol and Ativan to relieve agitation. Upon evaluation this morning, patient is AAOx 1-2 and unable to provide further information.    Collateral information from emergency contact Stefanie Carrasquillo ([phone]) was obtained. As per Stefanie she is unaware of the patient medical history and current medication     Addendum ]  At 16:30pm  Notified by RN  At 16:30   89 yo M PMH of CAD s/p stent   present with hematuria, As per ED note, patient noticed blood from urinary meatus since yesterday afternoon.  Patient states had similar episode in May and did not follow-up with urologist.   Urology team was consulted and  recommended Chavez catheter placement. Patient refused and stated   he does not want anything inserted into his penis. As per the ED note, at approx. 5am, patient was  uncooperative, agitated and screaming in ED.  Patient  was given  Haldol and Ativan to relieve agitation. Upon evaluation this morning, patient is AAOx 1-2 and unable to provide further information.    Collateral information from emergency contact Stefanie Carrasquillo ([phone]) was obtained. As per Stefanie she is unaware of the patient medical history and current medication     [Addendum ]  At 16:30pm

## 2024-07-08 NOTE — H&P ADULT - ATTENDING COMMENTS
89 y.o m with h.o htn, cad s.p stents . dm recent admission from 6/28-6/29 for constipation , h.o hematuria, hld p/w hematuria x 1 day, in the ED patient was seen by Urology and recommended Becerril catheter but refused, he was given ativan and haldol due to agitation and was initially confused but around 11 am was waking up and more coherent and was able to tell why he is here and does not want becerril as he was not having hematuria anymore, patient was started on ivf and bladder scan done showing < 170 cc of urine and 120 cc at 1:40 pm per RN documentation, Patient was counseled regarding becerril but adamantly refused. Patient was re-evaluated by resident at 4 pm and had mild hematuria- again refusing becerril, will repeat labs and check cbc, bmp mg phos, hold lovenox, c/w bb, hold home diabetes meds as patient Hba1C 2 weeks ago was 5.6,  Urology consult appreciated    a/p# Hematuria # THOMAS # CAD # DM    -check cbc, bmp mg phos today and repeat same labs in the am, will t/fuse if hb drops by 2 points from baseline, Becerril - if patient agrees,   -c/w stating and metoprolol, hold diabetes meds,   -renally does all medications  -avoid nephrotoxic meds  -c/w rest of supportive care.  SCD for dvt ppx

## 2024-07-08 NOTE — H&P ADULT - NSHPREVIEWOFSYSTEMS_GEN_ALL_CORE
REVIEW OF SYSTEMS:  CONSTITUTIONAL: No fevers or chills  ENT: No nasal congestion, No sore throat, No ear pain,   RESPIRATORY: No cough, SOB,  wheezing  CARDIOVASCULAR: No chest pain or  palpitations  GASTROINTESTINAL: No abdominal pain. No nausea, vomiting, diarrhea or constipation.  GENITOURINARY: + hematuria, No dysuria, frequency   NEUROLOGICAL: No headache,  SKIN: No itching, rashes,   All other review of systems is negative unless indicated above.

## 2024-07-08 NOTE — CONSULT NOTE ADULT - NS ATTEND AMEND GEN_ALL_CORE FT
Pt seen and examined. Agree with note as written above. Per pt, he is voiding small amount but adequately. Continues to have some blood at urethral meatus.    - Plan as above  - Again stressed importance of catheter placement which would help stop any urethral bleeding and help with voiding but pt is adamant about no catheter at this time

## 2024-07-08 NOTE — H&P ADULT - PROBLEM SELECTOR PLAN 2
p/w  SCr 1.44 on admission  Baseline SCr - 1.13 on 6/9/24  likely pre renal vs obstruction   - f/u BMP  - Avoid Nephrotoxic agent. p/w  SCr 1.44 on admission  Baseline SCr - 1.13 on 6/9/24  likely pre renal vs obstruction   - D5% IVF 75cc [id number]   - f/u BMP  - Avoid Nephrotoxic agent. p/w  SCr 1.44 on admission  Baseline SCr - 1.13 on 6/9/24  likely pre renal vs obstruction   - D5% IVF 75cc x 24hrs   - f/u BMP  - Avoid Nephrotoxic agent.

## 2024-07-08 NOTE — H&P ADULT - NSHPPHYSICALEXAM_GEN_ALL_CORE
GENERAL: NAD,   HEAD:  Atraumatic, Normocephalic  EYES: clear conjunctiva and  sclera   ENT: Moist mucous membranes  NECK: Supple  LUNG: Clear to auscultation bilaterally. No rales, wheezing,   HEART: Regular rate and rhythm; No murmurs,   ABDOMEN: Bowel sounds present; Soft, Nontender, Nondistended,   EXTREMITIES:  2+ Peripheral Pulses, . No clubbing, cyanosis, or edema  NERVOUS SYSTEM:  AAOX0-1,   SKIN: No rashes or lesions GENERAL: NAD,   HEAD:  Atraumatic, Normocephalic  EYES: clear conjunctiva and  sclera   ENT: Moist mucous membranes  NECK: Supple  LUNG: Clear to auscultation bilaterally. No rales, wheezing,   HEART: Regular rate and rhythm; No murmurs,   ABDOMEN: Bowel sounds present; Soft, Nontender, Nondistended,   EXTREMITIES:  2+ Peripheral Pulses, . No clubbing, cyanosis, or edema  NERVOUS SYSTEM:  AAOX1-2  SKIN: No rashes or lesions GENERAL: NAD,   HEAD:  Atraumatic, Normocephalic  EYES: clear conjunctiva and  sclera   ENT: Moist mucous membranes  NECK: Supple  LUNG: Clear to auscultation bilaterally. No rales, wheezing,   HEART: Regular rate and rhythm; No murmurs,   ABDOMEN: Bowel sounds present; Soft, Nontender, Nondistended,   : Blood noted around the urinary meatus. No lesion. Chaperone ( PCA Ammon)  EXTREMITIES:  2+ Peripheral Pulses, . No clubbing, cyanosis, or edema  NERVOUS SYSTEM:  AAOX1-2  SKIN: No rashes or lesions GENERAL: NAD,   HEAD:  Atraumatic, Normocephalic  EYES: clear conjunctiva and  sclera   ENT: Moist mucous membranes  NECK: Supple  LUNG: Clear to auscultation bilaterally. No rales, wheezing,   HEART: Regular rate and rhythm; No murmurs,   ABDOMEN: Bowel sounds present; Soft, Nontender, Nondistended,   : Blood noted around the urinary meatus. No lesion. Chaperone ( PCA Ammon)  EXTREMITIES:  2+ Peripheral Pulses, . No clubbing, cyanosis, or edema  NERVOUS SYSTEM:  AAOX1-2  SKIN: multiple ecchymosis noted over patient arm

## 2024-07-08 NOTE — ED ADULT NURSE NOTE - NS ED NURSE LEVEL OF CONSCIOUSNESS ORIENTATION
[Vulvar Atrophy] : vulvar atrophy [Normal] : uterus [Labia Majora] : labia major [Atrophy] : atrophy [Erythema] : erythema [No Bleeding] : there was no active vaginal bleeding [Uterine Adnexae] : were not tender and not enlarged [de-identified] : irritation, dryness with scattered   small hyperkeratotic areas  Oriented - self; Oriented - place; Oriented - time

## 2024-07-08 NOTE — ED PROVIDER NOTE - CLINICAL SUMMARY MEDICAL DECISION MAKING FREE TEXT BOX
Patient is agitated and refusing Chavez catheter.  Patient requires low-dose Ativan/Haldol to relieve agitation.  Chavez catheter not placed due to patient potential to injure himself if self removed.  Case d/w hospitalist Dr. Miranda and MAR pt to be admitted for urology consult, serial H/H, monitoring.  I had a detailed discussion with the patient and/or guardian regarding the historical points, exam findings, and any diagnostic results supporting the admit diagnosis.

## 2024-07-08 NOTE — ED ADULT NURSE REASSESSMENT NOTE - NS ED NURSE REASSESS COMMENT FT1
Patient alert and verbally responsive, breathing in room air, food tray provided Patient tolerated well. assisted with urinal noted with gross hematuria MAR Brendan Steiner called and made aware stat CBC completed, safety and comfort maintained
Patient bladder scan 120. MAR at bedside and is aware.
Patient observed sleeping most of the time, easily arose by verbal stimuli, breathing unlabored, hematuria noted DR Perkins notified. blader can competed 156 cc urine noted.
Report given and bladder scan was done and had 20ml.
bladder scanned this  pt, result of 168 ML urine.
urology at bedside still refusing, explained the importance of catheter but still refusing.
Dr Geronimo at bedside explaining  that the pt needs a becerril catheter to stop the bleeding . pt screaming " NO CATHETER, NO!"

## 2024-07-08 NOTE — CHART NOTE - NSCHARTNOTEFT_GEN_A_CORE
Spoke to the emergency contact Stefanie Carrasquillo ([phone]) about patient refusal for Becerril placement after multiple times of trying to convince him. Patient was put on the phone with Stefanie and patient refused Becerril placement. As per patient, "I do not want anything going into my penis". Patient was informed about the benefit of the becerril and he refused. Notified by the RN that patient had blood coming out from the meatus.   Patient was seen at bedside ans examined  PE: Blood noted in the urinary meatus. No lesion (chaperone PCA Ammon)    Called the emergency contact Stefanie Carrasquillo ([phone])  about patient refusal for Chavez placement after multiple times of trying to convince him. Patient was put on the phone with Stefanie and patient refused Chavez placement. As per patient, "I do not want anything going into my penis". Patient was informed about the benefit of the Chavez and he refused. Notified by the RN that patient had blood coming out from his penis  Patient was seen at bedside ans examined  PE: Blood noted in the urinary meatus. No lesion (chaperone PCA Ammon)    Called the emergency contact Stefanie Carrasquillo ([phone])  about patient refusal for Chavez placement after multiple times of trying to convince him. Patient was put on the phone with Stefanie and patient refused Chavez placement. As per patient, "I do not want anything going into my penis". Patient was informed about the benefit of the Chavez and he refused. Notified by the RN that patient is currently bleeding from his penis   Patient was seen at bedside and examined  PE: Blood noted in the urinary meatus. No lesion (chaperone PCA Ammon)    Called the emergency contact Stefanie Carrasquillo ([phone])  about patient refusal for Chvaez placement after multiple times of trying to convince him. Patient was put on the phone with Stefanie and patient refused Chavez placement. As per patient, "I do not want anything going into my penis". Patient was informed about the benefit of the Chavez and he refused.

## 2024-07-09 DIAGNOSIS — Z75.8 OTHER PROBLEMS RELATED TO MEDICAL FACILITIES AND OTHER HEALTH CARE: ICD-10-CM

## 2024-07-09 DIAGNOSIS — I25.10 ATHEROSCLEROTIC HEART DISEASE OF NATIVE CORONARY ARTERY WITHOUT ANGINA PECTORIS: ICD-10-CM

## 2024-07-09 LAB
ALBUMIN SERPL ELPH-MCNC: 2.3 G/DL — LOW (ref 3.5–5)
ALP SERPL-CCNC: 50 U/L — SIGNIFICANT CHANGE UP (ref 40–120)
ALT FLD-CCNC: 21 U/L DA — SIGNIFICANT CHANGE UP (ref 10–60)
ANION GAP SERPL CALC-SCNC: 10 MMOL/L — SIGNIFICANT CHANGE UP (ref 5–17)
AST SERPL-CCNC: 22 U/L — SIGNIFICANT CHANGE UP (ref 10–40)
BILIRUB SERPL-MCNC: 0.6 MG/DL — SIGNIFICANT CHANGE UP (ref 0.2–1.2)
BUN SERPL-MCNC: 39 MG/DL — HIGH (ref 7–18)
CALCIUM SERPL-MCNC: 8.1 MG/DL — LOW (ref 8.4–10.5)
CHLORIDE SERPL-SCNC: 114 MMOL/L — HIGH (ref 96–108)
CO2 SERPL-SCNC: 23 MMOL/L — SIGNIFICANT CHANGE UP (ref 22–31)
CREAT SERPL-MCNC: 1.33 MG/DL — HIGH (ref 0.5–1.3)
EGFR: 49 ML/MIN/1.73M2 — LOW
GLUCOSE SERPL-MCNC: 140 MG/DL — HIGH (ref 70–99)
HCT VFR BLD CALC: 28.2 % — LOW (ref 39–50)
HGB BLD-MCNC: 9.1 G/DL — LOW (ref 13–17)
MAGNESIUM SERPL-MCNC: 2.4 MG/DL — SIGNIFICANT CHANGE UP (ref 1.6–2.6)
MCHC RBC-ENTMCNC: 29.2 PG — SIGNIFICANT CHANGE UP (ref 27–34)
MCHC RBC-ENTMCNC: 32.3 GM/DL — SIGNIFICANT CHANGE UP (ref 32–36)
MCV RBC AUTO: 90.4 FL — SIGNIFICANT CHANGE UP (ref 80–100)
NRBC # BLD: 0 /100 WBCS — SIGNIFICANT CHANGE UP (ref 0–0)
PHOSPHATE SERPL-MCNC: 3.8 MG/DL — SIGNIFICANT CHANGE UP (ref 2.5–4.5)
PLATELET # BLD AUTO: 129 K/UL — LOW (ref 150–400)
POTASSIUM SERPL-MCNC: 4 MMOL/L — SIGNIFICANT CHANGE UP (ref 3.5–5.3)
POTASSIUM SERPL-SCNC: 4 MMOL/L — SIGNIFICANT CHANGE UP (ref 3.5–5.3)
PROT SERPL-MCNC: 5.5 G/DL — LOW (ref 6–8.3)
RBC # BLD: 3.12 M/UL — LOW (ref 4.2–5.8)
RBC # FLD: 14.5 % — SIGNIFICANT CHANGE UP (ref 10.3–14.5)
SODIUM SERPL-SCNC: 147 MMOL/L — HIGH (ref 135–145)
WBC # BLD: 4.27 K/UL — SIGNIFICANT CHANGE UP (ref 3.8–10.5)
WBC # FLD AUTO: 4.27 K/UL — SIGNIFICANT CHANGE UP (ref 3.8–10.5)

## 2024-07-09 PROCEDURE — 99232 SBSQ HOSP IP/OBS MODERATE 35: CPT

## 2024-07-09 RX ORDER — DEXTROSE MONOHYDRATE AND SODIUM CHLORIDE 5; .3 G/100ML; G/100ML
1000 INJECTION, SOLUTION INTRAVENOUS
Refills: 0 | Status: DISCONTINUED | OUTPATIENT
Start: 2024-07-09 | End: 2024-07-13

## 2024-07-09 RX ADMIN — Medication 650 MILLIGRAM(S): at 21:45

## 2024-07-09 RX ADMIN — DEXTROSE MONOHYDRATE AND SODIUM CHLORIDE 75 MILLILITER(S): 5; .3 INJECTION, SOLUTION INTRAVENOUS at 01:30

## 2024-07-09 RX ADMIN — DEXTROSE MONOHYDRATE AND SODIUM CHLORIDE 75 MILLILITER(S): 5; .3 INJECTION, SOLUTION INTRAVENOUS at 17:12

## 2024-07-09 RX ADMIN — ATORVASTATIN CALCIUM 10 MILLIGRAM(S): 20 TABLET, FILM COATED ORAL at 21:46

## 2024-07-09 RX ADMIN — DEXTROSE MONOHYDRATE AND SODIUM CHLORIDE 75 MILLILITER(S): 5; .3 INJECTION, SOLUTION INTRAVENOUS at 13:08

## 2024-07-09 RX ADMIN — Medication 650 MILLIGRAM(S): at 22:45

## 2024-07-09 NOTE — PATIENT PROFILE ADULT - FALL HARM RISK - HARM RISK INTERVENTIONS

## 2024-07-09 NOTE — PATIENT PROFILE ADULT - NSPROPTRIGHTNOTIFY_GEN_A_NUR
Alert-The patient is alert, awake and responds to voice. The patient is oriented to time, place, and person. The triage nurse is able to obtain subjective information. information could not be obtained

## 2024-07-09 NOTE — PROGRESS NOTE ADULT - SUBJECTIVE AND OBJECTIVE BOX
NP Note discussed with  primary attending    Patient is a 89y old  Male who presents with a chief complaint of Hematuria (08 Jul 2024 09:46)      INTERVAL HPI/OVERNIGHT EVENTS: no new complaints    MEDICATIONS  (STANDING):  atorvastatin 10 milliGRAM(s) Oral at bedtime  dextrose 5% + lactated ringers. 1000 milliLiter(s) (75 mL/Hr) IV Continuous <Continuous>    MEDICATIONS  (PRN):  acetaminophen     Tablet .. 650 milliGRAM(s) Oral every 6 hours PRN Temp greater or equal to 38C (100.4F), Mild Pain (1 - 3)      __________________________________________________  REVIEW OF SYSTEMS:    CONSTITUTIONAL: No fever,   EYES: no acute visual disturbances  NECK: No pain or stiffness  RESPIRATORY: No cough; No shortness of breath  CARDIOVASCULAR: No chest pain, no palpitations  GASTROINTESTINAL: No pain. No nausea or vomiting; No diarrhea   NEUROLOGICAL: No headache or numbness, no tremors  MUSCULOSKELETAL: No joint pain, no muscle pain  GENITOURINARY: no dysuria, no frequency, no hesitancy  PSYCHIATRY: no depression , no anxiety  ALL OTHER  ROS negative        Vital Signs Last 24 Hrs  T(C): 36.6 (09 Jul 2024 05:06), Max: 36.7 (09 Jul 2024 01:15)  T(F): 97.9 (09 Jul 2024 05:06), Max: 98 (09 Jul 2024 01:15)  HR: 72 (09 Jul 2024 05:06) (62 - 72)  BP: 133/52 (09 Jul 2024 05:06) (119/55 - 133/52)  BP(mean): --  RR: 18 (09 Jul 2024 05:06) (18 - 18)  SpO2: 99% (09 Jul 2024 05:06) (97% - 99%)    Parameters below as of 09 Jul 2024 05:06  Patient On (Oxygen Delivery Method): room air        ________________________________________________  PHYSICAL EXAM:  GENERAL: NAD  HEENT: Normocephalic;  conjunctivae and sclerae clear; moist mucous membranes;   NECK : supple  CHEST/LUNG: Clear to ausculitation bilaterally with good air entry   HEART: S1 S2  regular; no murmurs, gallops or rubs  ABDOMEN: Soft, Nontender, Nondistended; Bowel sounds present  EXTREMITIES: no cyanosis; no edema; no calf tenderness  SKIN: warm and dry; no rash  NERVOUS SYSTEM:  Awake and alert; Oriented  to place, person and time ; no new deficits    _________________________________________________  LABS:                        9.1    4.27  )-----------( 129      ( 09 Jul 2024 06:33 )             28.2     07-09    147<H>  |  114<H>  |  39<H>  ----------------------------<  140<H>  4.0   |  23  |  1.33<H>    Ca    8.1<L>      09 Jul 2024 06:33  Phos  3.8     07-09  Mg     2.4     07-09    TPro  5.5<L>  /  Alb  2.3<L>  /  TBili  0.6  /  DBili  x   /  AST  22  /  ALT  21  /  AlkPhos  50  07-09    PT/INR - ( 08 Jul 2024 02:30 )   PT: 11.7 sec;   INR: 1.03 ratio         PTT - ( 08 Jul 2024 02:30 )  PTT:33.3 sec  Urinalysis Basic - ( 09 Jul 2024 06:33 )    Color: x / Appearance: x / SG: x / pH: x  Gluc: 140 mg/dL / Ketone: x  / Bili: x / Urobili: x   Blood: x / Protein: x / Nitrite: x   Leuk Esterase: x / RBC: x / WBC x   Sq Epi: x / Non Sq Epi: x / Bacteria: x      CAPILLARY BLOOD GLUCOSE            RADIOLOGY & ADDITIONAL TESTS:  < from: Xray Chest 1 View-PORTABLE IMMEDIATE (Xray Chest 1 View-PORTABLE IMMEDIATE .) (07.08.24 @ 06:06) >  IMPRESSION:    No acute infiltrate.    --- End of Report ---    < end of copied text >    Imaging Personally Reviewed:  YES    Consultant(s) Notes Reviewed:   YES  Care Discussed with Consultants :     Plan of care was discussed with patient and /or primary care giver; all questions and concerns were addressed and care was aligned with patient's wishes.

## 2024-07-09 NOTE — PROGRESS NOTE ADULT - ASSESSMENT
7 yo M with PMH of CAD s/p stent   present with hematuria, As per ED note, patient noticed blood from urinary meatus since yesterday afternoon.  Patient states had similar episode in May and did not follow-up with urologist. In the ED, /73, HR 61, temp 97.6, Sat 97%. On physical examination AAOx1-2,  Blood noted around the urinary meatus, no penile lesion. Labs shows no leukocytosis, Hgb 11, Cr 1.44 ( baseline 1.13 on 6/29/24), Lipase 159. CXR negative for infiltrates. Patient received  Haldol 3mg and Ativan 0.5mg in the ED. Patient admitted to medicine for Hematuria.

## 2024-07-10 DIAGNOSIS — D62 ACUTE POSTHEMORRHAGIC ANEMIA: ICD-10-CM

## 2024-07-10 LAB
ANION GAP SERPL CALC-SCNC: 4 MMOL/L — LOW (ref 5–17)
BUN SERPL-MCNC: 36 MG/DL — HIGH (ref 7–18)
CALCIUM SERPL-MCNC: 7.9 MG/DL — LOW (ref 8.4–10.5)
CHLORIDE SERPL-SCNC: 112 MMOL/L — HIGH (ref 96–108)
CO2 SERPL-SCNC: 26 MMOL/L — SIGNIFICANT CHANGE UP (ref 22–31)
CREAT SERPL-MCNC: 1.37 MG/DL — HIGH (ref 0.5–1.3)
EGFR: 47 ML/MIN/1.73M2 — LOW
GLUCOSE SERPL-MCNC: 110 MG/DL — HIGH (ref 70–99)
HCT VFR BLD CALC: 23.7 % — LOW (ref 39–50)
HCT VFR BLD CALC: 26.2 % — LOW (ref 39–50)
HCT VFR BLD CALC: 28.9 % — LOW (ref 39–50)
HGB BLD-MCNC: 7.7 G/DL — LOW (ref 13–17)
HGB BLD-MCNC: 8.8 G/DL — LOW (ref 13–17)
HGB BLD-MCNC: 9.4 G/DL — LOW (ref 13–17)
MCHC RBC-ENTMCNC: 28.6 PG — SIGNIFICANT CHANGE UP (ref 27–34)
MCHC RBC-ENTMCNC: 28.9 PG — SIGNIFICANT CHANGE UP (ref 27–34)
MCHC RBC-ENTMCNC: 29.1 PG — SIGNIFICANT CHANGE UP (ref 27–34)
MCHC RBC-ENTMCNC: 32.5 GM/DL — SIGNIFICANT CHANGE UP (ref 32–36)
MCHC RBC-ENTMCNC: 32.5 GM/DL — SIGNIFICANT CHANGE UP (ref 32–36)
MCHC RBC-ENTMCNC: 33.6 GM/DL — SIGNIFICANT CHANGE UP (ref 32–36)
MCV RBC AUTO: 86.2 FL — SIGNIFICANT CHANGE UP (ref 80–100)
MCV RBC AUTO: 88.1 FL — SIGNIFICANT CHANGE UP (ref 80–100)
MCV RBC AUTO: 89.5 FL — SIGNIFICANT CHANGE UP (ref 80–100)
NRBC # BLD: 0 /100 WBCS — SIGNIFICANT CHANGE UP (ref 0–0)
PLATELET # BLD AUTO: 114 K/UL — LOW (ref 150–400)
PLATELET # BLD AUTO: 128 K/UL — LOW (ref 150–400)
PLATELET # BLD AUTO: 132 K/UL — LOW (ref 150–400)
POTASSIUM SERPL-MCNC: 4.3 MMOL/L — SIGNIFICANT CHANGE UP (ref 3.5–5.3)
POTASSIUM SERPL-SCNC: 4.3 MMOL/L — SIGNIFICANT CHANGE UP (ref 3.5–5.3)
RBC # BLD: 2.69 M/UL — LOW (ref 4.2–5.8)
RBC # BLD: 3.04 M/UL — LOW (ref 4.2–5.8)
RBC # BLD: 3.23 M/UL — LOW (ref 4.2–5.8)
RBC # FLD: 14.4 % — SIGNIFICANT CHANGE UP (ref 10.3–14.5)
RBC # FLD: 14.9 % — HIGH (ref 10.3–14.5)
RBC # FLD: 15.4 % — HIGH (ref 10.3–14.5)
SODIUM SERPL-SCNC: 142 MMOL/L — SIGNIFICANT CHANGE UP (ref 135–145)
WBC # BLD: 4.75 K/UL — SIGNIFICANT CHANGE UP (ref 3.8–10.5)
WBC # BLD: 5.84 K/UL — SIGNIFICANT CHANGE UP (ref 3.8–10.5)
WBC # BLD: 6.14 K/UL — SIGNIFICANT CHANGE UP (ref 3.8–10.5)
WBC # FLD AUTO: 4.75 K/UL — SIGNIFICANT CHANGE UP (ref 3.8–10.5)
WBC # FLD AUTO: 5.84 K/UL — SIGNIFICANT CHANGE UP (ref 3.8–10.5)
WBC # FLD AUTO: 6.14 K/UL — SIGNIFICANT CHANGE UP (ref 3.8–10.5)

## 2024-07-10 PROCEDURE — 99232 SBSQ HOSP IP/OBS MODERATE 35: CPT

## 2024-07-10 PROCEDURE — 99232 SBSQ HOSP IP/OBS MODERATE 35: CPT | Mod: FS

## 2024-07-10 RX ADMIN — ATORVASTATIN CALCIUM 10 MILLIGRAM(S): 20 TABLET, FILM COATED ORAL at 21:47

## 2024-07-10 NOTE — PROGRESS NOTE ADULT - SUBJECTIVE AND OBJECTIVE BOX
INTERVAL HPI/OVERNIGHT EVENTS:  Pt reports the becerril catheter was leaking around the urethral meatus overnight. Repots abdominal discomfort upon irrigation of becerril catheter.     MEDICATIONS  (STANDING):  atorvastatin 10 milliGRAM(s) Oral at bedtime  dextrose 5% + lactated ringers. 1000 milliLiter(s) (75 mL/Hr) IV Continuous <Continuous>  dextrose 5% + lactated ringers. 1000 milliLiter(s) (75 mL/Hr) IV Continuous <Continuous>    MEDICATIONS  (PRN):  acetaminophen     Tablet .. 650 milliGRAM(s) Oral every 6 hours PRN Temp greater or equal to 38C (100.4F), Mild Pain (1 - 3)      Vital Signs Last 24 Hrs  T(C): 36.6 (10 Jul 2024 10:53), Max: 37 (09 Jul 2024 12:40)  T(F): 97.8 (10 Jul 2024 10:53), Max: 98.6 (09 Jul 2024 12:40)  HR: 62 (10 Jul 2024 10:53) (62 - 74)  BP: 120/55 (10 Jul 2024 10:53) (120/55 - 145/61)  BP(mean): --  RR: 17 (10 Jul 2024 10:53) (16 - 20)  SpO2: 95% (10 Jul 2024 10:53) (95% - 98%)    Parameters below as of 10 Jul 2024 10:53  Patient On (Oxygen Delivery Method): room air    Physical:  General: A&Ox3. NAD.  Abdomen: Soft nondistended, nontender  Becerril: dark merlot colored urine output in becerril catheter; irrigated with 1L sterile water with 16F becerril in place, small amount of tiny clots removed    I&O's Detail  09 Jul 2024 07:01  -  10 Jul 2024 07:00  --------------------------------------------------------  IN:  Total IN: 0 mL    OUT:    Voided (mL): 150 mL  Total OUT: 150 mL  Total NET: -150 mL      LABS:                      7.7    4.75  )-----------( 128      ( 10 Jul 2024 06:40 )             23.7             07-10    142  |  112<H>  |  36<H>  ----------------------------<  110<H>  4.3   |  26  |  1.37<H>    Ca    7.9<L>      10 Jul 2024 06:40  Phos  3.8     07-09  Mg     2.4     07-09    TPro  5.5<L>  /  Alb  2.3<L>  /  TBili  0.6  /  DBili  x   /  AST  22  /  ALT  21  /  AlkPhos  50  07-09

## 2024-07-10 NOTE — PROGRESS NOTE ADULT - ASSESSMENT
5 yo M with PMH of CAD s/p stent   present with hematuria, As per ED note, patient noticed blood from urinary meatus since yesterday afternoon.  Patient states had similar episode in May and did not follow-up with urologist. In the ED, /73, HR 61, temp 97.6, Sat 97%. On physical examination AAOx1-2,  Blood noted around the urinary meatus, no penile lesion. Labs shows no leukocytosis, Hgb 11, Cr 1.44 ( baseline 1.13 on 6/29/24), Lipase 159. CXR negative for infiltrates. Patient received  Haldol 3mg and Ativan 0.5mg in the ED. Patient admitted to medicine for Hematuria.

## 2024-07-10 NOTE — PROGRESS NOTE ADULT - NS ATTEND AMEND GEN_ALL_CORE FT
Pt seen and examined. Agree with note as written above. Chavez currently draining yellow urine and pt denies any blood around the meatus at this time.    - Continue indwelling catheter  - Plan as above

## 2024-07-10 NOTE — PROGRESS NOTE ADULT - NS ATTEND AMEND GEN_ALL_CORE FT
#Hematuria 2/2 blood clot   #THOMAS  #CAD s/p PCI   #T2DM  #Gout  #BPH  #HTN  #HLD  #DVT ppx     89yM with PMH of type 2 DM, HTN, gout, BPH, HTN, HLD, who was seen in the ED on 05/21 for hematuria, CT abdomen/pelvis at that time showed a layering blood clot within the urinary bladder lumen. Urology recommended becerril catheter then, however, patient declined. He was recently admitted to [facility] from 06/28-06/29 for constipation s/p fecal disimpaction in the ED, no hematuria evident at that time. He now returns with hematuria, was initially declining becerril catheter again, agitated, and required Haldol and Ativan. Patient seen and examined at bedside. Overnight agreed to becerril placement. Notes pain at site of becerril insertion, upset but calm.     -Hgb decreased to 7.7, transfuse 1u pRBC and check post-transfusion CBC  -Becerril draining dark red blood    -ACHS FS, ISS; last checked a1c was 5.6%  -Continue home meds for chronic conditions   -Urology following; irrigation today, patient declined becerril exchange, will be reevaluated   -DVT ppx - SCDs #Hematuria 2/2 blood clot   #Acute blood loss anemia 2/2 hematuria   #THOMAS  #CAD s/p PCI   #T2DM  #Gout  #BPH  #HTN  #HLD  #DVT ppx     89yM with PMH of type 2 DM, HTN, gout, BPH, HTN, HLD, who was seen in the ED on 05/21 for hematuria, CT abdomen/pelvis at that time showed a layering blood clot within the urinary bladder lumen. Urology recommended becerril catheter then, however, patient declined. He was recently admitted to [facility] from 06/28-06/29 for constipation s/p fecal disimpaction in the ED, no hematuria evident at that time. He now returns with hematuria, was initially declining becerril catheter again, agitated, and required Haldol and Ativan. Patient seen and examined at bedside. Overnight agreed to becerril placement. Notes pain at site of becerril insertion, upset but calm.     -Hgb decreased to 7.7, transfuse 1u pRBC and check post-transfusion CBC  -Becerril draining dark red blood    -ACHS FS, ISS; last checked a1c was 5.6%  -Continue home meds for chronic conditions   -Urology following; irrigation today, patient declined bceerril exchange, will be reevaluated   -DVT ppx - SCDs

## 2024-07-10 NOTE — CHART NOTE - NSCHARTNOTEFT_GEN_A_CORE
89b yo M with PMH of CAD s/p stent was admitted with hematuria, as per pt had similar episode in May 2024but w/o f/u with urologist. Patient refused to put [place] catheter , abdomen soft, no tender , pt however feels pain . Received Acetaminophen 650 mg at 21: 45 , for pain and agreed to place Chavze catheter, Chavez placed at 2 am , total output to 7 am - 150 ml  ( hematuria) e and pt reported decrease in pain,. bladder scan done at 6 am-- 0 ml . Endorsed to day ACP to notify Attending , pat may need to reconsult Urology for possible cytoscopy ? H and H 7.7/23 dropped from yesterday 9.1/28.2 , type and screen active , will notify ACP to discuss with Attending urology consult/ close monitoring of CBC / potential blood transfusion.          Vital Signs Last 24 Hrs  T(C): 36.3 (10 Jul 2024 05:05), Max: 37 (09 Jul 2024 12:40)  T(F): 97.3 (10 Jul 2024 05:05), Max: 98.6 (09 Jul 2024 12:40)  HR: 68 (10 Jul 2024 05:05) (68 - 74)  BP: 140/58 (10 Jul 2024 05:05) (127/55 - 145/61)  BP(mean): --  RR: 16 (10 Jul 2024 05:05) (16 - 17)  SpO2: 97% (10 Jul 2024 05:05) (97% - 98%)    Parameters below as of 10 Jul 2024 05:05  Patient On (Oxygen Delivery Method): room air\    Denisha Stephenson NP-C  Medicine Department   [facility]

## 2024-07-10 NOTE — PROGRESS NOTE ADULT - SUBJECTIVE AND OBJECTIVE BOX
NP Note discussed with  primary attending    Patient is a 89y old  Male who presents with a chief complaint of Hematuria (10 Jul 2024 12:00)      INTERVAL HPI/OVERNIGHT EVENTS: no new complaints    MEDICATIONS  (STANDING):  atorvastatin 10 milliGRAM(s) Oral at bedtime  dextrose 5% + lactated ringers. 1000 milliLiter(s) (75 mL/Hr) IV Continuous <Continuous>  dextrose 5% + lactated ringers. 1000 milliLiter(s) (75 mL/Hr) IV Continuous <Continuous>    MEDICATIONS  (PRN):  acetaminophen     Tablet .. 650 milliGRAM(s) Oral every 6 hours PRN Temp greater or equal to 38C (100.4F), Mild Pain (1 - 3)      __________________________________________________  REVIEW OF SYSTEMS:    CONSTITUTIONAL: No fever,   EYES: no acute visual disturbances  NECK: No pain or stiffness  RESPIRATORY: No cough; No shortness of breath  CARDIOVASCULAR: No chest pain, no palpitations  GASTROINTESTINAL: No pain. No nausea or vomiting; No diarrhea   NEUROLOGICAL: No headache or numbness, no tremors  MUSCULOSKELETAL: No joint pain, no muscle pain  GENITOURINARY: no dysuria, no frequency, no hesitancy  PSYCHIATRY: no depression , no anxiety  ALL OTHER  ROS negative        Vital Signs Last 24 Hrs  T(C): 36.9 (10 Jul 2024 13:57), Max: 37.1 (10 Jul 2024 13:11)  T(F): 98.4 (10 Jul 2024 13:57), Max: 98.7 (10 Jul 2024 13:11)  HR: 57 (10 Jul 2024 13:57) (54 - 72)  BP: 118/62 (10 Jul 2024 13:57) (118/62 - 145/61)  BP(mean): --  RR: 16 (10 Jul 2024 13:57) (16 - 20)  SpO2: 97% (10 Jul 2024 13:57) (95% - 98%)    Parameters below as of 10 Jul 2024 13:57  Patient On (Oxygen Delivery Method): room air        ________________________________________________  PHYSICAL EXAM:  GENERAL: NAD  HEENT: Normocephalic;  conjunctivae and sclerae clear; moist mucous membranes;   NECK : supple  CHEST/LUNG: Clear to ausculitation bilaterally with good air entry   HEART: S1 S2  regular; no murmurs, gallops or rubs  ABDOMEN: Soft, Nontender, Nondistended; Bowel sounds present  : + Chavez with dark red hematuria  EXTREMITIES: no cyanosis; no edema; no calf tenderness  SKIN: warm and dry; no rash  NERVOUS SYSTEM:  Awake and alert; Oriented  to place, person and time ; no new deficits    _________________________________________________  LABS:                        7.7    4.75  )-----------( 128      ( 10 Jul 2024 06:40 )             23.7     07-10    142  |  112<H>  |  36<H>  ----------------------------<  110<H>  4.3   |  26  |  1.37<H>    Ca    7.9<L>      10 Jul 2024 06:40  Phos  3.8     07-09  Mg     2.4     07-09    TPro  5.5<L>  /  Alb  2.3<L>  /  TBili  0.6  /  DBili  x   /  AST  22  /  ALT  21  /  AlkPhos  50  07-09      Urinalysis Basic - ( 10 Jul 2024 06:40 )    Color: x / Appearance: x / SG: x / pH: x  Gluc: 110 mg/dL / Ketone: x  / Bili: x / Urobili: x   Blood: x / Protein: x / Nitrite: x   Leuk Esterase: x / RBC: x / WBC x   Sq Epi: x / Non Sq Epi: x / Bacteria: x      CAPILLARY BLOOD GLUCOSE            RADIOLOGY & ADDITIONAL TESTS:    Imaging Personally Reviewed:  YES    Consultant(s) Notes Reviewed:   YES    Care Discussed with Consultants :     Plan of care was discussed with patient and /or primary care giver; all questions and concerns were addressed and care was aligned with patient's wishes.

## 2024-07-10 NOTE — PROGRESS NOTE ADULT - ASSESSMENT
Pt refusing becerril replacement at this time  Explained to patient the benefit of exchanging becerril to a larger lumen to facilitate irrigation and evacuation of clots in bladder  Pt agreeable for discussion after lunch time    Transfuse as needed per medicine  Pain control   Will revisit discussion regarding becerril catheter exchange to larger lumen and possible initiation of CBI

## 2024-07-11 ENCOUNTER — TRANSCRIPTION ENCOUNTER (OUTPATIENT)
Age: 89
End: 2024-07-11

## 2024-07-11 LAB
ANION GAP SERPL CALC-SCNC: 2 MMOL/L — LOW (ref 5–17)
BUN SERPL-MCNC: 31 MG/DL — HIGH (ref 7–18)
CALCIUM SERPL-MCNC: 7.8 MG/DL — LOW (ref 8.4–10.5)
CHLORIDE SERPL-SCNC: 109 MMOL/L — HIGH (ref 96–108)
CO2 SERPL-SCNC: 29 MMOL/L — SIGNIFICANT CHANGE UP (ref 22–31)
CREAT SERPL-MCNC: 1.08 MG/DL — SIGNIFICANT CHANGE UP (ref 0.5–1.3)
EGFR: 63 ML/MIN/1.73M2 — SIGNIFICANT CHANGE UP
GLUCOSE SERPL-MCNC: 89 MG/DL — SIGNIFICANT CHANGE UP (ref 70–99)
HCT VFR BLD CALC: 26.5 % — LOW (ref 39–50)
HGB BLD-MCNC: 8.9 G/DL — LOW (ref 13–17)
MCHC RBC-ENTMCNC: 29.3 PG — SIGNIFICANT CHANGE UP (ref 27–34)
MCHC RBC-ENTMCNC: 33.6 GM/DL — SIGNIFICANT CHANGE UP (ref 32–36)
MCV RBC AUTO: 87.2 FL — SIGNIFICANT CHANGE UP (ref 80–100)
NRBC # BLD: 0 /100 WBCS — SIGNIFICANT CHANGE UP (ref 0–0)
PLATELET # BLD AUTO: 117 K/UL — LOW (ref 150–400)
POTASSIUM SERPL-MCNC: 4.5 MMOL/L — SIGNIFICANT CHANGE UP (ref 3.5–5.3)
POTASSIUM SERPL-SCNC: 4.5 MMOL/L — SIGNIFICANT CHANGE UP (ref 3.5–5.3)
RBC # BLD: 3.04 M/UL — LOW (ref 4.2–5.8)
RBC # FLD: 15.3 % — HIGH (ref 10.3–14.5)
SODIUM SERPL-SCNC: 140 MMOL/L — SIGNIFICANT CHANGE UP (ref 135–145)
WBC # BLD: 5.05 K/UL — SIGNIFICANT CHANGE UP (ref 3.8–10.5)
WBC # FLD AUTO: 5.05 K/UL — SIGNIFICANT CHANGE UP (ref 3.8–10.5)

## 2024-07-11 PROCEDURE — 99232 SBSQ HOSP IP/OBS MODERATE 35: CPT | Mod: FS

## 2024-07-11 RX ADMIN — ATORVASTATIN CALCIUM 10 MILLIGRAM(S): 20 TABLET, FILM COATED ORAL at 22:22

## 2024-07-11 NOTE — DIETITIAN INITIAL EVALUATION ADULT - PROBLEM SELECTOR PLAN 1
p/w hematuria  hemodynamically stable  PE: AAOx 0-1, no penile lesion  Bladder scan 120cc of urine   no leukocytosis   PE: AAOx1-2, blood noted around the urinary meatus.   - f/u UA  - f/u Ucx   - CBC Q2hrs   - Urology team consulted > recommended becerril placement

## 2024-07-11 NOTE — CHART NOTE - NSCHARTNOTEFT_GEN_A_CORE
EVENT: S/p 1 PRBC 7/10  Hemoglobin: 8.8 g/dL (07.10.24 @ 23:42)  Hemoglobin: 9.4 g/dL (07.10.24 @ 16:00)    BRIEF HPI: 89 yo M with PMH of CAD s/p stent   present with hematuria, As per ED note, patient noticed blood from urinary meatus since yesterday afternoon.  Patient states had similar episode in May and did not follow-up with urologist. In the ED, /73, HR 61, temp 97.6, Sat 97%. On physical examination AAOx1-2,  Blood noted around the urinary meatus, no penile lesion. Labs shows no leukocytosis, Hgb 11, Cr 1.44 ( baseline 1.13 on 6/29/24), Lipase 159. CXR negative for infiltrates. Patient received  Haldol 3mg and Ativan 0.5mg in the ED. Patient admitted to medicine for Hematuria.     Vital Signs Last 24 Hrs  T(C): 36.7 (10 Jul 2024 20:22), Max: 37.1 (10 Jul 2024 13:11)  T(F): 98.1 (10 Jul 2024 20:22), Max: 98.7 (10 Jul 2024 13:11)  HR: 58 (10 Jul 2024 20:22) (54 - 70)  BP: 110/60 (10 Jul 2024 20:22) (110/60 - 145/54)  BP(mean): --  RR: 16 (10 Jul 2024 20:22) (16 - 20)  SpO2: 98% (10 Jul 2024 20:22) (95% - 98%)    Parameters below as of 10 Jul 2024 20:22  Patient On (Oxygen Delivery Method): room air    FOCUSSED PE  : Chavez in place, urine output clear yellow, no hematuria in tubes  CV: S1 S2, mild kermit  RESP: Even, unlabored  NEURO: Drowsy, easily awaken, oriented X 3    PLAN  Monitor urine output for hematuria    FOLLOW UP: AM CBC

## 2024-07-11 NOTE — DIETITIAN INITIAL EVALUATION ADULT - REASON
Unable to perform NFPE as pt refused upon asking. Pt has visible moderate temporal muscle wasting and mild orbital fat loss

## 2024-07-11 NOTE — PROGRESS NOTE ADULT - NS ATTEND AMEND GEN_ALL_CORE FT
#Hematuria 2/2 blood clot   #Acute blood loss anemia 2/2 hematuria   #THOMAS  #CAD s/p PCI   #T2DM  #Gout  #BPH  #HTN  #HLD  #DVT ppx     89yM with PMH of type 2 DM, HTN, gout, BPH, HTN, HLD, who was seen in the ED on 05/21 for hematuria, CT abdomen/pelvis at that time showed a layering blood clot within the urinary bladder lumen. Urology recommended becerril catheter then, however, patient declined. He was recently admitted to [facility] from 06/28-06/29 for constipation s/p fecal disimpaction in the ED, no hematuria evident at that time. He now returns with hematuria, was initially declining becerril catheter again, agitated, and required Haldol and Ativan. Patient seen and examined at bedside. Pleasant. Requesting to know what color urine is in his becerril, and whether it is working. Reassurance provided.     PE: as above; vitals reviewed, NAD, abdomen soft/nontender, A+Ox3, no le edema, [place], becerril with clear urine  Labs reviewed: CBC, BMP, Mg, Phos; monitor daily     -Hgb stable  -Hematuria resolved, becerril draining clear urine   -TOV  -ACHS FS, ISS; last checked a1c was 5.6%  -Continue home meds for chronic conditions   -Urology following; will need outpatient f/u   -DVT ppx

## 2024-07-11 NOTE — DIETITIAN INITIAL EVALUATION ADULT - ORAL NUTRITION SUPPLEMENTS
Offered to provide glucerna oral protein supplement to aid in decreased PO intake during hospital stay, pt refused.

## 2024-07-11 NOTE — DIETITIAN INITIAL EVALUATION ADULT - ADD RECOMMEND
1) Recommend SLP evaluation to assess pt's ability to chew/swallow for potential advancement to regular diet consistency. Defer to MD.  2) Continue current diet   3) Encourage PO intake and honor food preferences as able.   4) Monitor PO intake, labs, weights, BMs, and skin integrity.

## 2024-07-11 NOTE — DIETITIAN INITIAL EVALUATION ADULT - ORAL INTAKE PTA/DIET HISTORY
Spoke to pt at bedside. Pt was uncooperative and verbally aggressive toward writer. Pt reported no food allergies or intolerances. Pt does not take and vitamins or supplements at home. Pt stated "his appetite is good PTA when he isn't being served [facility] food." Pt stated UBW was 127 lbs, currently is 109.8 lbs, reports no recent significant weight changes. HIE: 110lbs - 9/18/23.

## 2024-07-11 NOTE — DISCHARGE NOTE NURSING/CASE MANAGEMENT/SOCIAL WORK - NSDCVIVACCINE_GEN_ALL_CORE_FT
influenza, high-dose, quadrivalent; 04-Oct-2023 13:21; Mariah Hernandez (RN); [employer]; [id number] (Exp. Date: 30-Jun-2024); IntraMuscular; Deltoid Left.; 0.7 milliLiter(s); VIS (VIS Published: 06-Aug-2021, VIS Presented: 04-Oct-2023);

## 2024-07-11 NOTE — DIETITIAN INITIAL EVALUATION ADULT - PERTINENT LABORATORY DATA
07-11    140  |  109<H>  |  31<H>  ----------------------------<  89  4.5   |  29  |  1.08    Ca    7.8<L>      11 Jul 2024 05:30    A1C with Estimated Average Glucose Result: 5.6 % (06-29-24 @ 07:00)  A1C with Estimated Average Glucose Result: 5.8 % (09-19-23 @ 05:10)

## 2024-07-11 NOTE — DIETITIAN INITIAL EVALUATION ADULT - PERTINENT MEDS FT
MEDICATIONS  (STANDING):  atorvastatin 10 milliGRAM(s) Oral at bedtime  dextrose 5% + lactated ringers. 1000 milliLiter(s) (75 mL/Hr) IV Continuous <Continuous>  dextrose 5% + lactated ringers. 1000 milliLiter(s) (75 mL/Hr) IV Continuous <Continuous>    MEDICATIONS  (PRN):  acetaminophen     Tablet .. 650 milliGRAM(s) Oral every 6 hours PRN Temp greater or equal to 38C (100.4F), Mild Pain (1 - 3)

## 2024-07-11 NOTE — DIETITIAN NUTRITION RISK NOTIFICATION - COMMENTS
Reymundo Mathew RD (Pager# [phone], also available on teams)  Please refer to initial dietitian note for comprehensive nutrition assessment.

## 2024-07-11 NOTE — PROGRESS NOTE ADULT - SUBJECTIVE AND OBJECTIVE BOX
NP Note discussed with  primary attending    Patient is a 89y old  Male who presents with a chief complaint of Hematuria     (11 Jul 2024 12:53)      INTERVAL HPI/OVERNIGHT EVENTS: no new complaints    MEDICATIONS  (STANDING):  atorvastatin 10 milliGRAM(s) Oral at bedtime  dextrose 5% + lactated ringers. 1000 milliLiter(s) (75 mL/Hr) IV Continuous <Continuous>  dextrose 5% + lactated ringers. 1000 milliLiter(s) (75 mL/Hr) IV Continuous <Continuous>    MEDICATIONS  (PRN):  acetaminophen     Tablet .. 650 milliGRAM(s) Oral every 6 hours PRN Temp greater or equal to 38C (100.4F), Mild Pain (1 - 3)      __________________________________________________  REVIEW OF SYSTEMS:    CONSTITUTIONAL: No fever,   EYES: no acute visual disturbances  NECK: No pain or stiffness  RESPIRATORY: No cough; No shortness of breath  CARDIOVASCULAR: No chest pain, no palpitations  GASTROINTESTINAL: No pain. No nausea or vomiting; No diarrhea   NEUROLOGICAL: No headache or numbness, no tremors  MUSCULOSKELETAL: No joint pain, no muscle pain  GENITOURINARY: no dysuria, no frequency, no hesitancy  PSYCHIATRY: no depression , no anxiety  ALL OTHER  ROS negative        Vital Signs Last 24 Hrs  T(C): 36.7 (11 Jul 2024 13:45), Max: 36.7 (10 Jul 2024 20:22)  T(F): 98 (11 Jul 2024 13:45), Max: 98.1 (10 Jul 2024 20:22)  HR: 62 (11 Jul 2024 13:45) (51 - 62)  BP: 120/67 (11 Jul 2024 13:45) (110/60 - 135/50)  BP(mean): --  RR: 17 (11 Jul 2024 13:45) (16 - 18)  SpO2: 98% (11 Jul 2024 13:45) (97% - 98%)    Parameters below as of 11 Jul 2024 13:45  Patient On (Oxygen Delivery Method): room air        ________________________________________________  PHYSICAL EXAM:  GENERAL: NAD  HEENT: Normocephalic;  conjunctivae and sclerae clear; moist mucous membranes;   NECK : supple  CHEST/LUNG: Clear to ausculitation bilaterally with good air entry   HEART: S1 S2  regular; no murmurs, gallops or rubs  ABDOMEN: Soft, Nontender, Nondistended; Bowel sounds present  : + Chavez cath with [place] color urine  EXTREMITIES: no cyanosis; no edema; no calf tenderness  SKIN: warm and dry; no rash  NERVOUS SYSTEM:  Awake and alert; Oriented  to place, person and time ; no new deficits    _________________________________________________  LABS:                        8.9    5.05  )-----------( 117      ( 11 Jul 2024 05:30 )             26.5     07-11    140  |  109<H>  |  31<H>  ----------------------------<  89  4.5   |  29  |  1.08    Ca    7.8<L>      11 Jul 2024 05:30        Urinalysis Basic - ( 11 Jul 2024 05:30 )    Color: x / Appearance: x / SG: x / pH: x  Gluc: 89 mg/dL / Ketone: x  / Bili: x / Urobili: x   Blood: x / Protein: x / Nitrite: x   Leuk Esterase: x / RBC: x / WBC x   Sq Epi: x / Non Sq Epi: x / Bacteria: x      CAPILLARY BLOOD GLUCOSE            RADIOLOGY & ADDITIONAL TESTS:    Imaging Personally Reviewed:  YES    Consultant(s) Notes Reviewed:   YES    Care Discussed with Consultants :     Plan of care was discussed with patient and /or primary care giver; all questions and concerns were addressed and care was aligned with patient's wishes.

## 2024-07-11 NOTE — DISCHARGE NOTE NURSING/CASE MANAGEMENT/SOCIAL WORK - NSDCPEFALRISK_GEN_ALL_CORE
For information on Fall & Injury Prevention, visit: [email or web address] OR  [email or web address] OR  https://www.cdc.gov/steadi/patient.html

## 2024-07-11 NOTE — DISCHARGE NOTE NURSING/CASE MANAGEMENT/SOCIAL WORK - PATIENT PORTAL LINK FT
You can access the FollowMyHealth Patient Portal offered by [facility] by registering at the following website: [email or web address]. By joining [email or web address]’s FollowMyHealth portal, you will also be able to view your health information using other applications (apps) compatible with our system.

## 2024-07-11 NOTE — PROGRESS NOTE ADULT - SUBJECTIVE AND OBJECTIVE BOX
S: No acute complaints. Urine color resolved to yellow color.     O:  Vital Signs Last 24 Hrs  T(C): 36.6 (11 Jul 2024 05:30), Max: 37.1 (10 Jul 2024 13:11)  T(F): 97.9 (11 Jul 2024 05:30), Max: 98.7 (10 Jul 2024 13:11)  HR: 51 (11 Jul 2024 05:30) (51 - 58)  BP: 135/50 (11 Jul 2024 05:30) (110/60 - 145/54)  BP(mean): --  RR: 18 (11 Jul 2024 05:30) (16 - 18)  SpO2: 97% (11 Jul 2024 05:30) (96% - 98%)    Parameters below as of 11 Jul 2024 05:30  Patient On (Oxygen Delivery Method): room air    Exam:  Gen: awake, alert, NAD  Resp: nonlabored   : becerril with clear yellow urine output today, flowing well    LABS:                      8.9    5.05  )-----------( 117      ( 11 Jul 2024 05:30 )             26.5   07-11  140  |  109<H>  |  31<H>  ----------------------------<  89  4.5   |  29  |  1.08    Ca    7.8<L>      11 Jul 2024 05:30

## 2024-07-11 NOTE — PROGRESS NOTE ADULT - ASSESSMENT
hematuria, resolved today  Pt refusing any invasive urologic interventions at this time, wants to go home  May give TOV as clinically indicated  Remainder of care per medicine

## 2024-07-11 NOTE — DIETITIAN INITIAL EVALUATION ADULT - PROBLEM SELECTOR PLAN 2
p/w  SCr 1.44 on admission  Baseline SCr - 1.13 on 6/9/24  likely pre renal vs obstruction   - D5% IVF 75cc x 24hrs   - f/u BMP  - Avoid Nephrotoxic agent.

## 2024-07-12 ENCOUNTER — TRANSCRIPTION ENCOUNTER (OUTPATIENT)
Age: 89
End: 2024-07-12

## 2024-07-12 LAB
ANION GAP SERPL CALC-SCNC: 5 MMOL/L — SIGNIFICANT CHANGE UP (ref 5–17)
BUN SERPL-MCNC: 23 MG/DL — HIGH (ref 7–18)
CALCIUM SERPL-MCNC: 8.1 MG/DL — LOW (ref 8.4–10.5)
CHLORIDE SERPL-SCNC: 105 MMOL/L — SIGNIFICANT CHANGE UP (ref 96–108)
CO2 SERPL-SCNC: 29 MMOL/L — SIGNIFICANT CHANGE UP (ref 22–31)
CREAT SERPL-MCNC: 0.92 MG/DL — SIGNIFICANT CHANGE UP (ref 0.5–1.3)
EGFR: 76 ML/MIN/1.73M2 — SIGNIFICANT CHANGE UP
GLUCOSE SERPL-MCNC: 93 MG/DL — SIGNIFICANT CHANGE UP (ref 70–99)
HCT VFR BLD CALC: 30 % — LOW (ref 39–50)
HGB BLD-MCNC: 9.9 G/DL — LOW (ref 13–17)
MCHC RBC-ENTMCNC: 28.7 PG — SIGNIFICANT CHANGE UP (ref 27–34)
MCHC RBC-ENTMCNC: 33 GM/DL — SIGNIFICANT CHANGE UP (ref 32–36)
MCV RBC AUTO: 87 FL — SIGNIFICANT CHANGE UP (ref 80–100)
NRBC # BLD: 0 /100 WBCS — SIGNIFICANT CHANGE UP (ref 0–0)
PLATELET # BLD AUTO: 118 K/UL — LOW (ref 150–400)
POTASSIUM SERPL-MCNC: 4 MMOL/L — SIGNIFICANT CHANGE UP (ref 3.5–5.3)
POTASSIUM SERPL-SCNC: 4 MMOL/L — SIGNIFICANT CHANGE UP (ref 3.5–5.3)
RBC # BLD: 3.45 M/UL — LOW (ref 4.2–5.8)
RBC # FLD: 14.3 % — SIGNIFICANT CHANGE UP (ref 10.3–14.5)
SODIUM SERPL-SCNC: 139 MMOL/L — SIGNIFICANT CHANGE UP (ref 135–145)
WBC # BLD: 5.29 K/UL — SIGNIFICANT CHANGE UP (ref 3.8–10.5)
WBC # FLD AUTO: 5.29 K/UL — SIGNIFICANT CHANGE UP (ref 3.8–10.5)

## 2024-07-12 PROCEDURE — 99232 SBSQ HOSP IP/OBS MODERATE 35: CPT

## 2024-07-12 RX ORDER — TAMSULOSIN HYDROCHLORIDE 0.4 MG/1
0.4 CAPSULE ORAL AT BEDTIME
Refills: 0 | Status: DISCONTINUED | OUTPATIENT
Start: 2024-07-12 | End: 2024-07-15

## 2024-07-12 RX ORDER — TAMSULOSIN HYDROCHLORIDE 0.4 MG/1
0.4 CAPSULE ORAL ONCE
Refills: 0 | Status: COMPLETED | OUTPATIENT
Start: 2024-07-12 | End: 2024-07-12

## 2024-07-12 RX ORDER — TAMSULOSIN HYDROCHLORIDE 0.4 MG/1
CAPSULE ORAL
Refills: 0 | Status: DISCONTINUED | OUTPATIENT
Start: 2024-07-12 | End: 2024-07-15

## 2024-07-12 RX ADMIN — TAMSULOSIN HYDROCHLORIDE 0.4 MILLIGRAM(S): 0.4 CAPSULE ORAL at 21:42

## 2024-07-12 RX ADMIN — TAMSULOSIN HYDROCHLORIDE 0.4 MILLIGRAM(S): 0.4 CAPSULE ORAL at 01:24

## 2024-07-12 NOTE — DISCHARGE NOTE PROVIDER - NSDCCPCAREPLAN_GEN_ALL_CORE_FT
PRINCIPAL DISCHARGE DIAGNOSIS  Diagnosis: Hematuria  Assessment and Plan of Treatment: You presented to the hospital with complaints of Hematuria. Hematuria is blood in your urine.  This can be caused by an infection, kidney stone, kidney disease, enlarged prostate, intense exercise, trauma or certain types of cancer.  Certain foods can make your urine look like there is blood when there is not any.    You were followed by the urology team and your hematuria has resolved.     Should you experience blood in your urine you should notify your primary doctor or Urologist.     Take all of your medications as presribed.  Follow up with your providers as directed.        SECONDARY DISCHARGE DIAGNOSES  Diagnosis: Anemia due to acute blood loss  Assessment and Plan of Treatment: You were found to have a low blood count on admission, which was likely due to hematuria. You were treated with a unit of red blood cells. Your blood count was monitored and remained stable. Anemia is a low number of red blood cells. Some causes of anemia include: A gastrointestinal bleed, Liver or kidney disease, cancer, Alcohol abuse and Lack of foods that contain iron, folic acid, or vitamin B12. Symptoms to report, bleeding, palpitations, fatigue, pale skin, cold skin, dizziness. Take medications as ordered by PCP.  Follow- up with your PCP.      Diagnosis: THOMAS (acute kidney injury)  Assessment and Plan of Treatment: You presented with Elevated Creatinine. You were treated and your kidney funtion improved. Please continue oral hydration as much as possible within the daily drinking limit of 2 L per day  Prevent acute kidney injury: Manage other health conditions such as diabetes, high blood pressure, or heart disease. These conditions increase your risk for acute kidney injury. Talk to your healthcare provider before you take over-the-counter-medicine. NSAIDs, stomach medicine, or laxatives may harm your kidneys and increase your risk for acute kidney injury. Tell healthcare providers you have had acute kidney injury before you get contrast liquid for an x-ray or CT scan.   Follow up with your healthcare provider      Diagnosis: CAD (coronary artery disease)  Assessment and Plan of Treatment: Coronary artery disease is a condition where the arteries the supply the heart muscle gets clogged with fatty deposits & puts you at risk for a heart attack. Continue with medications as prescribed.   Call your doctor if you have any new pain, pressure, or discomfort in the center of your chest, pain, tingling or discomfort in arms, back, neck, jaw, or stomach, shortness of breath, nausea, vomiting, burping or heartburn, sweating, cold and clammy skin, racing or abnormal heartbeat for more than 10 minutes or if they keep coming & going.  Call 911 and do not tr to get to hospital by care  You can help yourself with lifestyle changes (quitting smoking if you smoke), eat lots of fruits & vegetables & low fat dairy products, not a lot of meat & fatty foods, walk or some form of physical activity most days of the week, lose weight if you are overweight  Take your cardiac medication as prescribed to lower cholesterol, to lower blood pressure, aspirin to prevent blood clots, and diabetes control  Make sure to keep appointments with doctor for cardiac follow up care      Diagnosis: Diabetes mellitus  Assessment and Plan of Treatment: Continue to follow with your primary care MD or your endocrinologist. Discuss what the goal hemoglobin A1C level is for you.  Follow a heart healthy diabetic diet. If you check your fingerstick glucose at home, call your MD if it is greater than 250mg/dL on 2 occasions or less than 100mg/dL on 2 occasions. Know signs of low blood sugar, such as: dizziness, shakiness, sweating, confusion, hunger, nervousness- drink 4 ounces apple juice if occurs and call your doctor. Know early signs of high blood sugar, such as: frequent urination, increased thirst, blurry vision, fatigue, headache - call your doctor if this occurs.      Diagnosis: Gout  Assessment and Plan of Treatment: Continue with medication as prescribed.  Follow-up with your primary care physician .  Call your physician if you develop pain not relieved with pain regimen, fever and or swelling/redness in your extremity (ies).      Diagnosis: HLD (hyperlipidemia)  Assessment and Plan of Treatment: You have a history of hyperlipidemia, which is when you have too much cholesterol in your blood. High amounts of cholesterol in your blood can put you at higher risks for heart attck, strokes and other health problems. Follow up with PCP for treatment goals, continue medication as prescribed, have liver function testing every 3 months as anti lipid medications can cause liver irritation, eat low fat meals, avoid red meat, butter, fried foods and cheese. Get daily exercise.      Diagnosis: HTN (hypertension)  Assessment and Plan of Treatment: You have a history of high blood pressure. High blood pressure is a condition that puts you at risk for heart attack, stroke and kidney disease. Please continue to take your medications as prescribed. You can also help control your blood pressure by maintaining a healthy weight, eating a diet low in fat and rich in fruits and vegetables, reduce the amount of salt in your diet. Also, reduce alcohol and try to include some form of physical activity daily for at least 30 mins. Follow up with your medical doctor to establish long term blood pressure treatment goals.  Notify your doctor if you have any of the following symptoms:   Dizziness, Lightheadedness, Blurry vision, Headache, Chest pain, Shortness of breath       PRINCIPAL DISCHARGE DIAGNOSIS  Diagnosis: Hematuria  Assessment and Plan of Treatment: You presented to the hospital with complaints of Hematuria. Hematuria is blood in your urine.  This can be caused by an infection, kidney stone, kidney disease, enlarged prostate, intense exercise, trauma or certain types of cancer.  Certain foods can make your urine look like there is blood when there is not any.    You were followed by the urology team and your hematuria has resolved.     Should you experience blood in your urine you should notify your primary doctor or Urologist.     Take all of your medications as presribed.  Follow up with your providers as directed.        SECONDARY DISCHARGE DIAGNOSES  Diagnosis: CAD (coronary artery disease)  Assessment and Plan of Treatment: Coronary artery disease is a condition where the arteries the supply the heart muscle gets clogged with fatty deposits & puts you at risk for a heart attack. Continue with medications as prescribed.   Call your doctor if you have any new pain, pressure, or discomfort in the center of your chest, pain, tingling or discomfort in arms, back, neck, jaw, or stomach, shortness of breath, nausea, vomiting, burping or heartburn, sweating, cold and clammy skin, racing or abnormal heartbeat for more than 10 minutes or if they keep coming & going.  Call 911 and do not tr to get to hospital by care  You can help yourself with lifestyle changes (quitting smoking if you smoke), eat lots of fruits & vegetables & low fat dairy products, not a lot of meat & fatty foods, walk or some form of physical activity most days of the week, lose weight if you are overweight  Take your cardiac medication as prescribed to lower cholesterol, to lower blood pressure, aspirin to prevent blood clots, and diabetes control  Make sure to keep appointments with doctor for cardiac follow up care      Diagnosis: THOMAS (acute kidney injury)  Assessment and Plan of Treatment: You presented with Elevated Creatinine. You were treated and your kidney funtion improved. Please continue oral hydration as much as possible within the daily drinking limit of 2 L per day  Prevent acute kidney injury: Manage other health conditions such as diabetes, high blood pressure, or heart disease. These conditions increase your risk for acute kidney injury. Talk to your healthcare provider before you take over-the-counter-medicine. NSAIDs, stomach medicine, or laxatives may harm your kidneys and increase your risk for acute kidney injury. Tell healthcare providers you have had acute kidney injury before you get contrast liquid for an x-ray or CT scan.   Follow up with your healthcare provider      Diagnosis: Anemia due to acute blood loss  Assessment and Plan of Treatment: You were found to have a low blood count on admission, which was likely due to hematuria. You were treated with a unit of red blood cells. Your blood count was monitored and remained stable. Anemia is a low number of red blood cells. Some causes of anemia include: A gastrointestinal bleed, Liver or kidney disease, cancer, Alcohol abuse and Lack of foods that contain iron, folic acid, or vitamin B12. Symptoms to report, bleeding, palpitations, fatigue, pale skin, cold skin, dizziness. Take medications as ordered by PCP.  Follow- up with your PCP.      Diagnosis: Diabetes mellitus  Assessment and Plan of Treatment: Continue to follow with your primary care MD or your endocrinologist. Discuss what the goal hemoglobin A1C level is for you.  Follow a heart healthy diabetic diet. If you check your fingerstick glucose at home, call your MD if it is greater than 250mg/dL on 2 occasions or less than 100mg/dL on 2 occasions. Know signs of low blood sugar, such as: dizziness, shakiness, sweating, confusion, hunger, nervousness- drink 4 ounces apple juice if occurs and call your doctor. Know early signs of high blood sugar, such as: frequent urination, increased thirst, blurry vision, fatigue, headache - call your doctor if this occurs.      Diagnosis: Gout  Assessment and Plan of Treatment: Continue with medication as prescribed.  Follow-up with your primary care physician .  Call your physician if you develop pain not relieved with pain regimen, fever and or swelling/redness in your extremity (ies).      Diagnosis: HLD (hyperlipidemia)  Assessment and Plan of Treatment: You have a history of hyperlipidemia, which is when you have too much cholesterol in your blood. High amounts of cholesterol in your blood can put you at higher risks for heart attck, strokes and other health problems. Follow up with PCP for treatment goals, continue medication as prescribed, have liver function testing every 3 months as anti lipid medications can cause liver irritation, eat low fat meals, avoid red meat, butter, fried foods and cheese. Get daily exercise.      Diagnosis: HTN (hypertension)  Assessment and Plan of Treatment: You have a history of high blood pressure. High blood pressure is a condition that puts you at risk for heart attack, stroke and kidney disease. Please continue to take your medications as prescribed. You can also help control your blood pressure by maintaining a healthy weight, eating a diet low in fat and rich in fruits and vegetables, reduce the amount of salt in your diet. Also, reduce alcohol and try to include some form of physical activity daily for at least 30 mins. Follow up with your medical doctor to establish long term blood pressure treatment goals.  Notify your doctor if you have any of the following symptoms:   Dizziness, Lightheadedness, Blurry vision, Headache, Chest pain, Shortness of breath

## 2024-07-12 NOTE — DISCHARGE NOTE PROVIDER - NSDCMRMEDTOKEN_GEN_ALL_CORE_FT
atorvastatin 20 mg oral tablet: 1 tab(s) orally once a day (at bedtime)  bisacodyl 5 mg oral tablet: 1 tab(s) orally once a day  Januvia 100 mg oral tablet: 1 tab(s) orally once a day  metoprolol succinate 25 mg oral tablet, extended release: 1 tab(s) orally once a day  pioglitazone 15 mg oral tablet: 1 tab(s) orally once a day  polyethylene glycol 3350 oral powder for reconstitution: 17 gram(s) orally once a day  senna leaf extract oral tablet: 2 tab(s) orally once a day (at bedtime)   atorvastatin 20 mg oral tablet: 1 tab(s) orally once a day (at bedtime)  bisacodyl 5 mg oral delayed release tablet: 1 tab(s) orally every 12 hours As needed Constipation  Januvia 100 mg oral tablet: 1 tab(s) orally once a day  metoprolol succinate 25 mg oral tablet, extended release: 1 tab(s) orally once a day  pioglitazone 15 mg oral tablet: 1 tab(s) orally once a day  polyethylene glycol 3350 oral powder for reconstitution: 17 gram(s) orally once a day  senna leaf extract oral tablet: 2 tab(s) orally once a day (at bedtime)   atorvastatin 20 mg oral tablet: 1 tab(s) orally once a day (at bedtime)  bisacodyl 5 mg oral delayed release tablet: 1 tab(s) orally every 12 hours As needed Constipation  Januvia 100 mg oral tablet: 1 tab(s) orally once a day  metoprolol succinate 25 mg oral tablet, extended release: 1 tab(s) orally once a day  pioglitazone 15 mg oral tablet: 1 tab(s) orally once a day  polyethylene glycol 3350 oral powder for reconstitution: 17 gram(s) orally once a day  senna leaf extract oral tablet: 2 tab(s) orally once a day (at bedtime)  tamsulosin 0.4 mg oral capsule: 0.4 milligram(s) orally once a day

## 2024-07-12 NOTE — DISCHARGE NOTE PROVIDER - CARE PROVIDER_API CALL
Erendira Adan [facility]  Urology  [address] [place] [address]  Phone: [phone]  Fax: [phone]  Follow Up Time:

## 2024-07-12 NOTE — PROGRESS NOTE ADULT - SUBJECTIVE AND OBJECTIVE BOX
S: Patient seen and examined at bedside. No acute events overnight. Resting comfortably. States he would like to walk as he has been in bed throughout admission. Denies any complaints, but would like his diet changed.     REVIEW OF SYSTEMS:  CONSTITUTIONAL: No weakness, fevers or chills  EYES: No visual changes  ENT: No vertigo or throat pain   NECK: No pain or stiffness  RESPIRATORY: No cough, wheezing, hemoptysis; No shortness of breath  CARDIOVASCULAR: No chest pain or palpitations  GASTROINTESTINAL: No abdominal or epigastric pain. No nausea, vomiting, or hematemesis; No diarrhea or constipation. No melena or hematochezia.  GENITOURINARY: No dysuria, frequency or hematuria  NEUROLOGICAL: No numbness or weakness  SKIN: No itching, rashes  PSYCH: No depression, anxiety    O:  Vital Signs Last 24 Hrs  T(C): 37.1 (12 Jul 2024 20:52), Max: 37.1 (12 Jul 2024 20:52)  T(F): 98.7 (12 Jul 2024 20:52), Max: 98.7 (12 Jul 2024 20:52)  HR: 75 (12 Jul 2024 20:52) (62 - 75)  BP: 115/68 (12 Jul 2024 20:52) (115/68 - 140/52)  BP(mean): 75 (12 Jul 2024 05:43) (75 - 75)  RR: 18 (12 Jul 2024 20:52) (16 - 18)  SpO2: 98% (12 Jul 2024 20:52) (94% - 100%)    Parameters below as of 12 Jul 2024 20:52  Patient On (Oxygen Delivery Method): room air    Constitutional/General: Elderly, NAD, vitals reviewed  EYE: Symmetrical pupils, conjunctiva clear   ENT: Good dentition, oropharynx clear, [place]  NECK: No visual masses, no JVD  CHEST: No respiratory distress, bilateral symmetrical chest rise  ABDOMEN: Nondistended, no visual masses  SKIN: No rash, warm, dry  NEURO: A+Ox3, Cranial nerves grossly intact, moves all extremities, follows commands  PSYCH: Normal mood, normal affect    acetaminophen     Tablet .. 650 milliGRAM(s) Oral every 6 hours PRN  atorvastatin 10 milliGRAM(s) Oral at bedtime  dextrose 5% + lactated ringers. 1000 milliLiter(s) IV Continuous <Continuous>  dextrose 5% + lactated ringers. 1000 milliLiter(s) IV Continuous <Continuous>  tamsulosin      tamsulosin 0.4 milliGRAM(s) Oral at bedtime                            9.9    5.29  )-----------( 118      ( 12 Jul 2024 07:10 )             30.0       07-12    139  |  105  |  23<H>  ----------------------------<  93  4.0   |  29  |  0.92    Ca    8.1<L>      12 Jul 2024 07:10

## 2024-07-12 NOTE — CHART NOTE - NSCHARTNOTEFT_GEN_A_CORE
EVENT: S/p Chavez removal 7/11 2:45 pm. Bladder scan 332 ml    BRIEF HPI: 89 yo M with PMH of CAD s/p stent   present with hematuria, As per ED note, patient noticed blood from urinary meatus since yesterday afternoon.  Patient states had similar episode in May and did not follow-up with urologist. In the ED, /73, HR 61, temp 97.6, Sat 97%. On physical examination AAOx1-2,  Blood noted around the urinary meatus, no penile lesion. Labs shows no leukocytosis, Hgb 11, Cr 1.44 ( baseline 1.13 on 6/29/24), Lipase 159. CXR negative for infiltrates. Patient received  Haldol 3mg and Ativan 0.5mg in the ED. Patient admitted to medicine for Hematuria.     Vital Signs Last 24 Hrs  T(C): 36.6 (11 Jul 2024 20:30), Max: 36.7 (11 Jul 2024 13:45)  T(F): 97.8 (11 Jul 2024 20:30), Max: 98 (11 Jul 2024 13:45)  HR: 58 (11 Jul 2024 20:30) (51 - 62)  BP: 149/70 (11 Jul 2024 20:30) (120/67 - 149/70)  BP(mean): --  RR: 18 (11 Jul 2024 20:30) (17 - 18)  SpO2: 97% (11 Jul 2024 20:30) (97% - 98%)    Parameters below as of 11 Jul 2024 20:30  Patient On (Oxygen Delivery Method): room air    FOCUSSED PE  : [place] cath in place no output noted  NEURO: Alert, oriented x 3  CV: S1 S2 regular    PLAN: EVENT: S/p Chavez removal 7/11 2:45 pm. Bladder scan 332 ml    BRIEF HPI: 89 yo M with PMH of CAD s/p stent   present with hematuria, As per ED note, patient noticed blood from urinary meatus since yesterday afternoon.  Patient states had similar episode in May and did not follow-up with urologist. In the ED, /73, HR 61, temp 97.6, Sat 97%. On physical examination AAOx1-2,  Blood noted around the urinary meatus, no penile lesion. Labs shows no leukocytosis, Hgb 11, Cr 1.44 ( baseline 1.13 on 6/29/24), Lipase 159. CXR negative for infiltrates. Patient received  Haldol 3mg and Ativan 0.5mg in the ED. Patient admitted to medicine for Hematuria.     Vital Signs Last 24 Hrs  T(C): 36.6 (11 Jul 2024 20:30), Max: 36.7 (11 Jul 2024 13:45)  T(F): 97.8 (11 Jul 2024 20:30), Max: 98 (11 Jul 2024 13:45)  HR: 58 (11 Jul 2024 20:30) (51 - 62)  BP: 149/70 (11 Jul 2024 20:30) (120/67 - 149/70)  BP(mean): --  RR: 18 (11 Jul 2024 20:30) (17 - 18)  SpO2: 97% (11 Jul 2024 20:30) (97% - 98%)    Parameters below as of 11 Jul 2024 20:30  Patient On (Oxygen Delivery Method): room air    FOCUSSED PE  : [place] cath in place no output noted  NEURO: Alert, oriented x 3  CV: S1 S2 regular    PLAN:  Tamsulosin 0.4mg now and QHS  Bladder scan 5 AM    FOLLOW UP: Bladder scan EVENT: S/p Chavez removal 7/11 2:45 pm. Bladder scan 332 ml. Previously voided 300 ml.    BRIEF HPI: 89 yo M with PMH of CAD s/p stent   present with hematuria, As per ED note, patient noticed blood from urinary meatus since yesterday afternoon.  Patient states had similar episode in May and did not follow-up with urologist. In the ED, /73, HR 61, temp 97.6, Sat 97%. On physical examination AAOx1-2,  Blood noted around the urinary meatus, no penile lesion. Labs shows no leukocytosis, Hgb 11, Cr 1.44 ( baseline 1.13 on 6/29/24), Lipase 159. CXR negative for infiltrates. Patient received  Haldol 3mg and Ativan 0.5mg in the ED. Patient admitted to medicine for Hematuria.     Vital Signs Last 24 Hrs  T(C): 36.6 (11 Jul 2024 20:30), Max: 36.7 (11 Jul 2024 13:45)  T(F): 97.8 (11 Jul 2024 20:30), Max: 98 (11 Jul 2024 13:45)  HR: 58 (11 Jul 2024 20:30) (51 - 62)  BP: 149/70 (11 Jul 2024 20:30) (120/67 - 149/70)  BP(mean): --  RR: 18 (11 Jul 2024 20:30) (17 - 18)  SpO2: 97% (11 Jul 2024 20:30) (97% - 98%)    Parameters below as of 11 Jul 2024 20:30  Patient On (Oxygen Delivery Method): room air    FOCUSSED PE  : [place] cath in place no output noted  NEURO: Alert, oriented x 3  CV: S1 S2 regular    PLAN:  Tamsulosin 0.4mg now and QHS  Bladder scan 5 AM    FOLLOW UP: Bladder scan

## 2024-07-12 NOTE — DISCHARGE NOTE PROVIDER - NSDCFUADDAPPT_GEN_ALL_CORE_FT
APPTS ARE READY TO BE MADE: [X] YES    Best Family or Patient Contact (if needed): Stefanie Carrasquillo ([phone])    Additional Information about above appointments (if needed):    1:   2:   3:     Other comments or requests:

## 2024-07-12 NOTE — PROGRESS NOTE ADULT - ASSESSMENT
89y.o. Male with bleeding from urethral meatus. hematuria resolved. Chavez removed, condom cath placed with 700 output. Patient does not want catheter and wants to go home.       Recommendations:   - No acute urological interventions indicated  - hematuria resolved   89y.o. Male with bleeding from urethral meatus. hematuria resolved. Chavez removed, condom cath placed with 700 output. Patient does not want catheter and wants to go home.       Recommendations:   - No acute urological interventions indicated  - hematuria resolved  - Cr wnl

## 2024-07-12 NOTE — DISCHARGE NOTE PROVIDER - HOSPITAL COURSE
89 yo M with PMH of CAD s/p stent   present with hematuria, As per ED note, patient noticed blood from urinary meatus. In the ED, /73, HR 61, temp 97.6, Sat 97%. On physical examination AAOx1-2,  Blood noted around the urinary meatus, no penile lesion. Labs shows no leukocytosis, Hgb 11, Cr 1.44 ( baseline 1.13 on 6/29/24), Lipase 159. CXR negative for infiltrates. Patient received Haldol 3mg and Ativan 0.5mg in the ED. Patient admitted to medicine for Hematuria. CT abdomen/pelvis at that time showed a layering blood clot within the urinary bladder lumen.    Patient transfused with PRBC and urology consulted. Pt s/p Chavez , condom cath placed with 700 output. Patient does not want catheter and wants to go home.     Urology recc:   - No acute urological interventions indicated  - hematuria resolved  - Cr wnl.    case discussed with attending pt stable for d/c. Please note that this a brief summary of hospital course please refer to daily progress notes and consult notes for full course and events         89 yo M with PMH of CAD s/p stent   present with hematuria, As per ED note, patient noticed blood from urinary meatus. In the ED, /73, HR 61, temp 97.6, Sat 97%. On physical examination AAOx1-2,  Blood noted around the urinary meatus, no penile lesion. Labs shows no leukocytosis, Hgb 11, Cr 1.44 ( baseline 1.13 on 6/29/24), Lipase 159. CXR negative for infiltrates. Patient received Haldol 3mg and Ativan 0.5mg in the ED. Patient admitted to medicine for Hematuria. CT abdomen/pelvis at that time showed a layering blood clot within the urinary bladder lumen. Urology was consulted for hematuria. Pt also noted with lo hemoglobin.  Patient transfused with PRBC. Pt s/p Chavez, hematuria resolved, & TOV was successful. Patient does not want catheter and wants to go home.     Urology recc:   - No acute urological interventions indicated  - hematuria resolved  - Cr wnl.    case discussed with attending pt stable for d/c. Please note that this a brief summary of hospital course please refer to daily progress notes and consult notes for full course and events         89 yo M with PMH of CAD s/p stent.  Presents with hematuria, as per ED note, patient noticed blood from urinary meatus. In the ED, /73, HR 61, temp 97.6, Sat 97%. On physical examination AAOx1-2,  Blood noted around the urinary meatus, no penile lesion. Labs shows no leukocytosis, Hgb 11, Cr 1.44 ( baseline 1.13 on 6/29/24), Lipase 159. CXR negative for infiltrates. Patient received Haldol 3mg and Ativan 0.5mg in the ED.  Admitted to medicine for Hematuria. CT abdomen/pelvis at that time showed a layering blood clot within the urinary bladder lumen. Urology was consulted for hematuria. Pt also noted with lo hemoglobin.  Patient transfused with PRBC. Pt s/p Chavez, hematuria resolved, & TOV was successful. Patient does not want catheter and wants to go home.     Urology recc:   - No acute urological interventions indicated  - hematuria resolved  - Cr wnl.    case discussed with attending pt stable for d/c. Please note that this a brief summary of hospital course please refer to daily progress notes and consult notes for full course and events         89 yo M with PMH of CAD s/p stent.  Presents with hematuria, as per ED note, patient noticed blood from urinary meatus. In the ED, /73, HR 61, temp 97.6, Sat 97%. On physical examination AAOx1-2,  Blood noted around the urinary meatus, no penile lesion. Labs shows no leukocytosis, Hgb 11, Cr 1.44 ( baseline 1.13 on 6/29/24), Lipase 159. CXR negative for infiltrates. Patient received Haldol 3mg and Ativan 0.5mg in the ED.  Admitted to medicine for Hematuria. CT abdomen/pelvis at that time showed a layering blood clot within the urinary bladder lumen. Urology was consulted for hematuria. Pt also noted with lo hemoglobin.  Patient transfused with PRBC. Pt s/p Chavez, hematuria resolved, & TOV was successful.     Urology recc:   - No acute urological interventions indicated  - hematuria resolved  - Cr wnl.    case discussed with attending pt stable for d/c. Please note that this a brief summary of hospital course please refer to daily progress notes and consult notes for full course and events

## 2024-07-12 NOTE — DISCHARGE NOTE PROVIDER - DETAILS OF MALNUTRITION DIAGNOSIS/DIAGNOSES
This patient has been assessed with a concern for Malnutrition and was treated during this hospitalization for the following Nutrition diagnosis/diagnoses:     -  07/11/2024: Underweight (BMI < 19)   Yes

## 2024-07-12 NOTE — PHYSICAL THERAPY INITIAL EVALUATION ADULT - GENERAL OBSERVATIONS, REHAB EVAL
elderly male patient, refer for PT for safe dc planning, lives alone, cane ambulator, drives in the neighborhood for provision, currently assistance required with rw and therapist for gait and transfers to unsteady gait

## 2024-07-12 NOTE — PROGRESS NOTE ADULT - ASSESSMENT
#Hematuria 2/2 blood clot, resolved  #Acute blood loss anemia 2/2 hematuria, resolved   #THOMAS  #CAD s/p PCI   #T2DM  #Gout  #BPH  #HTN  #HLD  #DVT ppx     89yM with PMH of type 2 DM, HTN, gout, BPH, HTN, HLD, who was seen in the ED on 05/21 for hematuria, CT abdomen/pelvis at that time showed a layering blood clot within the urinary bladder lumen. Urology recommended becerril catheter then, however, patient declined. He was recently admitted to [facility] from 06/28-06/29 for constipation s/p fecal disimpaction in the ED, no hematuria evident at that time. He now returns with hematuria, was initially declining becerril catheter again, agitated, and required Haldol and Ativan.     -Hgb stable  -Hematuria resolved, becerril draining clear urine   -TOV passed  -ACHS FS, ISS; last checked a1c was 5.6%  -Continue home meds for chronic conditions   -Urology following; will need outpatient f/u   -PT rec: phillip GARZA provided  -DVT ppx

## 2024-07-12 NOTE — PROGRESS NOTE ADULT - SUBJECTIVE AND OBJECTIVE BOX
Subjective  Chavez removed yesterday. Voided 300 with , condom cath placed and put out 700 cyu. BS with 277. PT wants to go home, denies suprapubic pain.     Objective    Vital signs  T(F): , Max: 98 (07-11-24 @ 13:45)  HR: 69 (07-12-24 @ 05:43)  BP: 140/52 (07-12-24 @ 05:43)  SpO2: 100% (07-12-24 @ 05:43)  Wt(kg): --    Output     OUT:    Indwelling Catheter - Urethral (mL): 450 mL    Voided (mL): 900 mL  Total OUT: 1350 mL    Total NET: -1350 mL          Gen: NAD  Abd: soft, nontender, nondistended  : Condom cath draining cyu    Labs      07-12 @ 07:10    WBC 5.29  / Hct 30.0  / SCr 0.92     07-11 @ 05:30    WBC 5.05  / Hct 26.5  / SCr 1.08             Imaging

## 2024-07-12 NOTE — PHYSICAL THERAPY INITIAL EVALUATION ADULT - AMBULATION SKILLS, REHAB EVAL
Detail Level: Detailed Quality 431: Preventive Care And Screening: Unhealthy Alcohol Use - Screening: Patient screened for unhealthy alcohol use using a single question and scores less than 2 times per year Quality 226: Preventive Care And Screening: Tobacco Use: Screening And Cessation Intervention: Patient screened for tobacco use and is an ex/non-smoker independent

## 2024-07-12 NOTE — DISCHARGE NOTE PROVIDER - ATTENDING DISCHARGE PHYSICAL EXAMINATION:
Constitutional/General: Well developed, vitals reviewed  EYE: Symmetrical pupils, conjunctiva clear   ENT: Good dentition, oropharynx clear, [place]  NECK: No visual masses, no JVD  CHEST: No respiratory distress, bilateral symmetrical chest rise  ABDOMEN: Nondistended, no visual masses  SKIN: No rash, warm, dry  NEURO: A+Ox3, Cranial nerves grossly intact, moves all extremities, follows commands  PSYCH: Normal mood, normal affect

## 2024-07-13 LAB
HCT VFR BLD CALC: 28.6 % — LOW (ref 39–50)
HGB BLD-MCNC: 9.5 G/DL — LOW (ref 13–17)
MCHC RBC-ENTMCNC: 28.9 PG — SIGNIFICANT CHANGE UP (ref 27–34)
MCHC RBC-ENTMCNC: 33.2 GM/DL — SIGNIFICANT CHANGE UP (ref 32–36)
MCV RBC AUTO: 86.9 FL — SIGNIFICANT CHANGE UP (ref 80–100)
NRBC # BLD: 0 /100 WBCS — SIGNIFICANT CHANGE UP (ref 0–0)
PLATELET # BLD AUTO: 119 K/UL — LOW (ref 150–400)
RBC # BLD: 3.29 M/UL — LOW (ref 4.2–5.8)
RBC # FLD: 14 % — SIGNIFICANT CHANGE UP (ref 10.3–14.5)
WBC # BLD: 5.7 K/UL — SIGNIFICANT CHANGE UP (ref 3.8–10.5)
WBC # FLD AUTO: 5.7 K/UL — SIGNIFICANT CHANGE UP (ref 3.8–10.5)

## 2024-07-13 PROCEDURE — 99232 SBSQ HOSP IP/OBS MODERATE 35: CPT

## 2024-07-13 RX ORDER — SENNOSIDES 8.6 MG
2 TABLET ORAL AT BEDTIME
Refills: 0 | Status: DISCONTINUED | OUTPATIENT
Start: 2024-07-13 | End: 2024-07-15

## 2024-07-13 RX ORDER — BISACODYL 5 MG
5 TABLET, DELAYED RELEASE (ENTERIC COATED) ORAL EVERY 12 HOURS
Refills: 0 | Status: DISCONTINUED | OUTPATIENT
Start: 2024-07-13 | End: 2024-07-15

## 2024-07-13 RX ORDER — POLYETHYLENE GLYCOL 3350 1 G/G
17 POWDER ORAL DAILY
Refills: 0 | Status: DISCONTINUED | OUTPATIENT
Start: 2024-07-13 | End: 2024-07-15

## 2024-07-13 RX ORDER — BISACODYL 5 MG
5 TABLET, DELAYED RELEASE (ENTERIC COATED) ORAL EVERY 12 HOURS
Refills: 0 | Status: DISCONTINUED | OUTPATIENT
Start: 2024-07-13 | End: 2024-07-13

## 2024-07-13 RX ADMIN — Medication 5 MILLIGRAM(S): at 16:38

## 2024-07-13 RX ADMIN — POLYETHYLENE GLYCOL 3350 17 GRAM(S): 1 POWDER ORAL at 16:38

## 2024-07-13 RX ADMIN — ATORVASTATIN CALCIUM 10 MILLIGRAM(S): 20 TABLET, FILM COATED ORAL at 21:30

## 2024-07-13 RX ADMIN — TAMSULOSIN HYDROCHLORIDE 0.4 MILLIGRAM(S): 0.4 CAPSULE ORAL at 21:30

## 2024-07-13 RX ADMIN — Medication 2 TABLET(S): at 21:29

## 2024-07-13 NOTE — CHART NOTE - NSCHARTNOTEFT_GEN_A_CORE
EVENT:  reported by RN pt has urge to void but unable. bladder scan with > 350cc      HPI:    89 yo M PMH of CAD s/p stent   present with hematuria, As per ED note, patient noticed blood from urinary meatus since yesterday afternoon.  Patient states had similar episode in May and did not follow-up with urologist.   Urology team was consulted and  recommended Chavez catheter placement. Patient refused and stated   he does not want anything inserted into his penis. As per the ED note, at approx. 5am, patient was  uncooperative, agitated and screaming in ED.  Patient  was given  Haldol and Ativan to relieve agitation. Upon evaluation this morning, patient is AAOx 1-2 and unable to provide further information.    Collateral information from emergency contact Stefanie Carrasquillo ([phone]) was obtained. As per Stefanie she is unaware of the patient medical history and current medication     [Addendum ]  At 16:30pm   (08 Jul 2024 09:46)        OBJECTIVE:  Vital Signs Last 24 Hrs  T(C): 36.6 (13 Jul 2024 05:57), Max: 37.1 (12 Jul 2024 20:52)  T(F): 97.9 (13 Jul 2024 05:57), Max: 98.7 (12 Jul 2024 20:52)  HR: 63 (13 Jul 2024 05:57) (62 - 75)  BP: 144/66 (13 Jul 2024 05:57) (115/68 - 144/66)  BP(mean): 92 (13 Jul 2024 05:57) (92 - 92)  RR: 18 (13 Jul 2024 05:57) (16 - 18)  SpO2: 97% (13 Jul 2024 05:57) (94% - 98%)    Parameters below as of 13 Jul 2024 05:57  Patient On (Oxygen Delivery Method): room air        LABS:                        9.5    5.70  )-----------( 119      ( 13 Jul 2024 06:49 )             28.6     07-12    139  |  105  |  23<H>  ----------------------------<  93  4.0   |  29  |  0.92    Ca    8.1<L>      12 Jul 2024 07:10              PLAN:   1. will strait cath pt. once    Follow- up:  1. Residual urine s/p strait cath

## 2024-07-13 NOTE — PROGRESS NOTE ADULT - ASSESSMENT
#Hematuria 2/2 blood clot, resolved  #Acute blood loss anemia 2/2 hematuria, resolved   #THOMAS  #CAD s/p PCI   #T2DM  #Gout  #BPH  #HTN  #HLD  #DVT ppx     89yM with PMH of type 2 DM, HTN, gout, BPH, HTN, HLD, who was seen in the ED on 05/21 for hematuria, CT abdomen/pelvis at that time showed a layering blood clot within the urinary bladder lumen. Urology recommended becerril catheter then, however, patient declined. He was recently admitted to [facility] from 06/28-06/29 for constipation s/p fecal disimpaction in the ED, no hematuria evident at that time. He now returns with hematuria, was initially declining becerril catheter again, agitated, and required Haldol and Ativan.     -Hgb stable  -Hematuria resolved, becerril draining clear urine   -TOV passed  -ACHS FS, ISS; last checked a1c was 5.6%  -Continue home meds for chronic conditions   -Urology following; will need outpatient f/u   -PT rec: phillip GARZA provided  -DVT ppx #Hematuria 2/2 blood clot, resolved  #Acute blood loss anemia 2/2 hematuria, resolved   #THOMAS  #CAD s/p PCI   #T2DM  #Gout  #BPH  #HTN  #HLD  #DVT ppx     89yM with PMH of type 2 DM, HTN, gout, BPH, HTN, HLD, who was seen in the ED on 05/21 for hematuria, CT abdomen/pelvis at that time showed a layering blood clot within the urinary bladder lumen. Urology recommended becerril catheter then, however, patient declined. He was recently admitted to [facility] from 06/28-06/29 for constipation s/p fecal disimpaction in the ED, no hematuria evident at that time. He now returns with hematuria, was initially declining becerril catheter again, agitated, and required Haldol and Ativan.     -Hgb stable  -Hematuria resolved  -TOV passed  -Given Dulcolax in addition to Senna/Miralax  -ACHS FS, ISS; last checked a1c was 5.6%  -Continue home meds for chronic conditions   -Urology following; will need outpatient f/u   -PT rec: phillip GARZA provided  -DVT ppx

## 2024-07-13 NOTE — PROGRESS NOTE ADULT - SUBJECTIVE AND OBJECTIVE BOX
S:    REVIEW OF SYSTEMS:  CONSTITUTIONAL: No weakness, fevers or chills  EYES: No visual changes  ENT: No vertigo or throat pain   NECK: No pain or stiffness  RESPIRATORY: No cough, wheezing, hemoptysis; No shortness of breath  CARDIOVASCULAR: No chest pain or palpitations  GASTROINTESTINAL: No abdominal or epigastric pain. No nausea, vomiting, or hematemesis; No diarrhea or constipation. No melena or hematochezia.  GENITOURINARY: No dysuria, frequency or hematuria  NEUROLOGICAL: No numbness or weakness  SKIN: No itching, rashes  PSYCH: No depression, anxiety    O:  Vital Signs Last 24 Hrs  T(C): 36.6 (13 Jul 2024 05:57), Max: 37.1 (12 Jul 2024 20:52)  T(F): 97.9 (13 Jul 2024 05:57), Max: 98.7 (12 Jul 2024 20:52)  HR: 63 (13 Jul 2024 05:57) (62 - 75)  BP: 144/66 (13 Jul 2024 05:57) (115/68 - 144/66)  BP(mean): 92 (13 Jul 2024 05:57) (92 - 92)  RR: 18 (13 Jul 2024 05:57) (16 - 18)  SpO2: 97% (13 Jul 2024 05:57) (94% - 98%)    Parameters below as of 13 Jul 2024 05:57  Patient On (Oxygen Delivery Method): room air    Constitutional/General: Well developed, vitals reviewed  EYE: Symmetrical pupils, conjunctiva clear   ENT: Good dentition, oropharynx clear  NECK: No visual masses, no JVD  CHEST: No respiratory distress, bilateral symmetrical chest rise  ABDOMEN: Nondistended, no visual masses  SKIN: No rash, warm, dry  NEURO: A+Ox3, Cranial nerves grossly intact, moves all extremities, follows commands  PSYCH: Normal mood, normal affect    acetaminophen     Tablet .. 650 milliGRAM(s) Oral every 6 hours PRN  atorvastatin 10 milliGRAM(s) Oral at bedtime  tamsulosin      tamsulosin 0.4 milliGRAM(s) Oral at bedtime                            9.5    5.70  )-----------( 119      ( 13 Jul 2024 06:49 )             28.6       07-12    139  |  105  |  23<H>  ----------------------------<  93  4.0   |  29  |  0.92    Ca    8.1<L>      12 Jul 2024 07:10         S: Patient seen and examined at bedside. No acute events overnight. Resting comfortably. States he is not urinating because he didn't drink any fluids for the last 15h, but now that he is, he is urinating more. States he feels constipated, requesting dulcolax.     REVIEW OF SYSTEMS:  CONSTITUTIONAL: No weakness, fevers or chills  EYES: No visual changes  ENT: No vertigo or throat pain   NECK: No pain or stiffness  RESPIRATORY: No cough, wheezing, hemoptysis; No shortness of breath  CARDIOVASCULAR: No chest pain or palpitations  GASTROINTESTINAL: +constipation. No abdominal or epigastric pain. No nausea, vomiting, or hematemesis; No diarrhea. No melena or hematochezia.  GENITOURINARY: No dysuria, frequency or hematuria  NEUROLOGICAL: No numbness or weakness  SKIN: No itching, rashes  PSYCH: No depression, anxiety    O:  Vital Signs Last 24 Hrs  T(C): 36.6 (13 Jul 2024 05:57), Max: 37.1 (12 Jul 2024 20:52)  T(F): 97.9 (13 Jul 2024 05:57), Max: 98.7 (12 Jul 2024 20:52)  HR: 63 (13 Jul 2024 05:57) (62 - 75)  BP: 144/66 (13 Jul 2024 05:57) (115/68 - 144/66)  BP(mean): 92 (13 Jul 2024 05:57) (92 - 92)  RR: 18 (13 Jul 2024 05:57) (16 - 18)  SpO2: 97% (13 Jul 2024 05:57) (94% - 98%)    Parameters below as of 13 Jul 2024 05:57  Patient On (Oxygen Delivery Method): room air    Constitutional/General: Elderly, NAD, vitals reviewed  EYE: Symmetrical pupils, conjunctiva clear   ENT: Good dentition, oropharynx clear  NECK: No visual masses, no JVD  CHEST: No respiratory distress, bilateral symmetrical chest rise  ABDOMEN: Nondistended, no visual masses  SKIN: No rash, warm, dry  NEURO: A+Ox3, Cranial nerves grossly intact, moves all extremities, follows commands  PSYCH: Normal mood, normal affect    acetaminophen     Tablet .. 650 milliGRAM(s) Oral every 6 hours PRN  atorvastatin 10 milliGRAM(s) Oral at bedtime  tamsulosin      tamsulosin 0.4 milliGRAM(s) Oral at bedtime                            9.5    5.70  )-----------( 119 ( 13 Jul 2024 06:49 )             28.6       07-12    139  |  105  |  23<H>  ----------------------------<  93  4.0   |  29  |  0.92    Ca    8.1<L>      12 Jul 2024 07:10

## 2024-07-14 PROCEDURE — 99232 SBSQ HOSP IP/OBS MODERATE 35: CPT

## 2024-07-14 RX ORDER — BENZOCAINE/MENTHOL 6 MG-10 MG
1 LOZENGE MUCOUS MEMBRANE ONCE
Refills: 0 | Status: COMPLETED | OUTPATIENT
Start: 2024-07-14 | End: 2024-07-14

## 2024-07-14 RX ADMIN — Medication 1 ENEMA: at 11:23

## 2024-07-14 RX ADMIN — TAMSULOSIN HYDROCHLORIDE 0.4 MILLIGRAM(S): 0.4 CAPSULE ORAL at 21:43

## 2024-07-14 RX ADMIN — POLYETHYLENE GLYCOL 3350 17 GRAM(S): 1 POWDER ORAL at 11:22

## 2024-07-14 NOTE — PROGRESS NOTE ADULT - ASSESSMENT
#Hematuria 2/2 blood clot, resolved  #Acute blood loss anemia 2/2 hematuria, resolved   #THOMAS  #CAD s/p PCI   #T2DM  #Gout  #BPH  #HTN  #HLD  #DVT ppx     89yM with PMH of type 2 DM, HTN, gout, BPH, HTN, HLD, who was seen in the ED on 05/21 for hematuria, CT abdomen/pelvis at that time showed a layering blood clot within the urinary bladder lumen. Urology recommended becerril catheter then, however, patient declined. He was recently admitted to [facility] from 06/28-06/29 for constipation s/p fecal disimpaction in the ED, no hematuria evident at that time. He now returns with hematuria, was initially declining becerril catheter again, agitated, and required Haldol and Ativan.     -Hgb stable  -Hematuria resolved  -TOV passed  -Given Dulcolax in addition to Senna/Miralax  -ACHS FS, ISS; last checked a1c was 5.6%  -Continue home meds for chronic conditions   -Urology following; will need outpatient f/u   -PT rec: phillip GARZA provided  -DVT ppx #Hematuria 2/2 blood clot, resolved  #Acute blood loss anemia 2/2 hematuria, resolved   #THOMAS  #CAD s/p PCI   #T2DM  #Gout  #BPH  #HTN  #HLD  #DVT ppx     89yM with PMH of type 2 DM, HTN, gout, BPH, HTN, HLD, who was seen in the ED on 05/21 for hematuria, CT abdomen/pelvis at that time showed a layering blood clot within the urinary bladder lumen. Urology recommended becerril catheter then, however, patient declined. He was recently admitted to [facility] from 06/28-06/29 for constipation s/p fecal disimpaction in the ED, no hematuria evident at that time. He now returns with hematuria, was initially declining becerril catheter again, agitated, and required Haldol and Ativan.     -Hgb stable  -Hematuria resolved  -TOV passed  -Continue Senna/Miralax  -ACHS FS, ISS; last checked a1c was 5.6%  -Continue home meds for chronic conditions   -Urology following; will need outpatient f/u   -PT rec: phillip GARZA provided  -DVT ppx

## 2024-07-14 NOTE — PROGRESS NOTE ADULT - SUBJECTIVE AND OBJECTIVE BOX
S:    REVIEW OF SYSTEMS:  CONSTITUTIONAL: No weakness, fevers or chills  EYES: No visual changes  ENT: No vertigo or throat pain   NECK: No pain or stiffness  RESPIRATORY: No cough, wheezing, hemoptysis; No shortness of breath  CARDIOVASCULAR: No chest pain or palpitations  GASTROINTESTINAL: No abdominal or epigastric pain. No nausea, vomiting, or hematemesis; No diarrhea or constipation. No melena or hematochezia.  GENITOURINARY: No dysuria, frequency or hematuria  NEUROLOGICAL: No numbness or weakness  SKIN: No itching, rashes  PSYCH: No depression, anxiety    O:  Vital Signs Last 24 Hrs  T(C): 36.8 (14 Jul 2024 05:33), Max: 37 (13 Jul 2024 20:51)  T(F): 98.2 (14 Jul 2024 05:33), Max: 98.6 (13 Jul 2024 20:51)  HR: 72 (14 Jul 2024 05:33) (67 - 79)  BP: 120/64 (14 Jul 2024 05:33) (110/61 - 120/64)  BP(mean): --  RR: 16 (14 Jul 2024 05:33) (16 - 16)  SpO2: 97% (14 Jul 2024 05:33) (95% - 97%)    Parameters below as of 14 Jul 2024 05:33  Patient On (Oxygen Delivery Method): room air    Constitutional/General: Well developed, vitals reviewed  EYE: Symmetrical pupils, conjunctiva clear   ENT: Good dentition, oropharynx clear  NECK: No visual masses, no JVD  CHEST: No respiratory distress, bilateral symmetrical chest rise  ABDOMEN: Nondistended, no visual masses  SKIN: No rash, warm, dry  NEURO: A+Ox3, Cranial nerves grossly intact, moves all extremities, follows commands  PSYCH: Normal mood, normal affect    acetaminophen     Tablet .. 650 milliGRAM(s) Oral every 6 hours PRN  atorvastatin 10 milliGRAM(s) Oral at bedtime  bisacodyl 5 milliGRAM(s) Oral every 12 hours PRN  polyethylene glycol 3350 17 Gram(s) Oral daily  senna 2 Tablet(s) Oral at bedtime  tamsulosin      tamsulosin 0.4 milliGRAM(s) Oral at bedtime                            9.5    5.70  )-----------( 119      ( 13 Jul 2024 06:49 )             28.6                S: Patient seen and examined at bedside. No acute events overnight. Sleeping, easily arousable. No new complaints, anticipating discharge.     REVIEW OF SYSTEMS:  CONSTITUTIONAL: No weakness, fevers or chills  EYES: No visual changes  ENT: No vertigo or throat pain   NECK: No pain or stiffness  RESPIRATORY: No cough, wheezing, hemoptysis; No shortness of breath  CARDIOVASCULAR: No chest pain or palpitations  GASTROINTESTINAL: No abdominal or epigastric pain. No nausea, vomiting, or hematemesis; No diarrhea or constipation. No melena or hematochezia.  GENITOURINARY: No dysuria, frequency or hematuria  NEUROLOGICAL: No numbness or weakness  SKIN: No itching, rashes  PSYCH: No depression, anxiety    O:  Vital Signs Last 24 Hrs  T(C): 36.8 (14 Jul 2024 05:33), Max: 37 (13 Jul 2024 20:51)  T(F): 98.2 (14 Jul 2024 05:33), Max: 98.6 (13 Jul 2024 20:51)  HR: 72 (14 Jul 2024 05:33) (67 - 79)  BP: 120/64 (14 Jul 2024 05:33) (110/61 - 120/64)  BP(mean): --  RR: 16 (14 Jul 2024 05:33) (16 - 16)  SpO2: 97% (14 Jul 2024 05:33) (95% - 97%)    Parameters below as of 14 Jul 2024 05:33  Patient On (Oxygen Delivery Method): room air      Constitutional/General: Elderly, NAD, vitals reviewed  EYE: Symmetrical pupils, conjunctiva clear   ENT: Good dentition, oropharynx clear, [place]  NECK: No visual masses, no JVD  CHEST: No respiratory distress, bilateral symmetrical chest rise  ABDOMEN: Nondistended, no visual masses  SKIN: No rash, warm, dry  NEURO: A+Ox3, Cranial nerves grossly intact, moves all extremities, follows commands  PSYCH: Normal mood, normal affect    acetaminophen     Tablet .. 650 milliGRAM(s) Oral every 6 hours PRN  atorvastatin 10 milliGRAM(s) Oral at bedtime  bisacodyl 5 milliGRAM(s) Oral every 12 hours PRN  polyethylene glycol 3350 17 Gram(s) Oral daily  senna 2 Tablet(s) Oral at bedtime  tamsulosin      tamsulosin 0.4 milliGRAM(s) Oral at bedtime                            9.5    5.70  )-----------( 119      ( 13 Jul 2024 06:49 )             28.6

## 2024-07-15 VITALS — DIASTOLIC BLOOD PRESSURE: 60 MMHG | OXYGEN SATURATION: 99 % | HEART RATE: 81 BPM | SYSTOLIC BLOOD PRESSURE: 109 MMHG

## 2024-07-15 PROCEDURE — 99285 EMERGENCY DEPT VISIT HI MDM: CPT | Mod: 25

## 2024-07-15 PROCEDURE — 85025 COMPLETE CBC W/AUTO DIFF WBC: CPT

## 2024-07-15 PROCEDURE — 85027 COMPLETE CBC AUTOMATED: CPT

## 2024-07-15 PROCEDURE — 86850 RBC ANTIBODY SCREEN: CPT

## 2024-07-15 PROCEDURE — 83735 ASSAY OF MAGNESIUM: CPT

## 2024-07-15 PROCEDURE — 96374 THER/PROPH/DIAG INJ IV PUSH: CPT

## 2024-07-15 PROCEDURE — 84100 ASSAY OF PHOSPHORUS: CPT

## 2024-07-15 PROCEDURE — 36430 TRANSFUSION BLD/BLD COMPNT: CPT

## 2024-07-15 PROCEDURE — 97161 PT EVAL LOW COMPLEX 20 MIN: CPT

## 2024-07-15 PROCEDURE — 93005 ELECTROCARDIOGRAM TRACING: CPT

## 2024-07-15 PROCEDURE — P9040: CPT

## 2024-07-15 PROCEDURE — 80053 COMPREHEN METABOLIC PANEL: CPT

## 2024-07-15 PROCEDURE — 71045 X-RAY EXAM CHEST 1 VIEW: CPT

## 2024-07-15 PROCEDURE — 96375 TX/PRO/DX INJ NEW DRUG ADDON: CPT

## 2024-07-15 PROCEDURE — 86923 COMPATIBILITY TEST ELECTRIC: CPT

## 2024-07-15 PROCEDURE — 86901 BLOOD TYPING SEROLOGIC RH(D): CPT

## 2024-07-15 PROCEDURE — 86900 BLOOD TYPING SEROLOGIC ABO: CPT

## 2024-07-15 PROCEDURE — 85610 PROTHROMBIN TIME: CPT

## 2024-07-15 PROCEDURE — 83690 ASSAY OF LIPASE: CPT

## 2024-07-15 PROCEDURE — 99239 HOSP IP/OBS DSCHRG MGMT >30: CPT

## 2024-07-15 PROCEDURE — 80048 BASIC METABOLIC PNL TOTAL CA: CPT

## 2024-07-15 PROCEDURE — 36415 COLL VENOUS BLD VENIPUNCTURE: CPT

## 2024-07-15 PROCEDURE — 85730 THROMBOPLASTIN TIME PARTIAL: CPT

## 2024-07-15 RX ORDER — BISACODYL 5 MG
1 TABLET, DELAYED RELEASE (ENTERIC COATED) ORAL
Qty: 0 | Refills: 0 | DISCHARGE
Start: 2024-07-15

## 2024-07-15 RX ORDER — SENNOSIDES 8.6 MG
2 TABLET ORAL
Qty: 0 | Refills: 0 | DISCHARGE
Start: 2024-07-15

## 2024-07-15 RX ORDER — POLYETHYLENE GLYCOL 3350 1 G/G
17 POWDER ORAL
Qty: 0 | Refills: 0 | DISCHARGE
Start: 2024-07-15

## 2024-07-15 RX ORDER — TAMSULOSIN HYDROCHLORIDE 0.4 MG/1
0.4 CAPSULE ORAL
Qty: 0 | Refills: 0 | DISCHARGE
Start: 2024-07-15

## 2024-07-15 NOTE — PROGRESS NOTE ADULT - PROBLEM SELECTOR PLAN 1
p/w hematuria  hemodynamically stable  PE: AAOx 0-1, no penile lesion  Bladder scan 120cc of urine   no leukocytosis    blood noted around the urinary meatus.   - f/u UA  - Monitor CBC    - Urology team consulted > recommended becerril placement > pt refused becerril
- Stable   - S/p 1U PRBC's
Pt noted with drop in hgb 9.1>7.7 in 24hrs  Likely iso hematuria  S/p 1U prbc 7/10  post CBC > 9.4> 8.8> 9.4  Continue to monitor CBC
Pt noted with drop in hgb 9.1>7.7 in 24hrs  Likely iso hematuria  S/p 1U prbc 7/10  f/u post CBC

## 2024-07-15 NOTE — PROGRESS NOTE ADULT - PROBLEM SELECTOR PLAN 2
p/w hematuria  hemodynamically stable in ED  No penile lesion  Bladder scan 120cc of urine   no leukocytosis     - f/u UA  - Monitor daily CBC    - Urology team consulted > recommended Chavez placement > pt refused Chavez initially  - Now agreed to Chavez on 7/10  - Chavez draining dark red color urine with some clot
- Resolved   - P/w Hematuria  - Remain hemodynamically stable   - S/p Chavez  - Voiding w/o difficulty
p/w  SCr 1.44 on admission  Baseline SCr - 1.13 on 6/9/24  likely pre renal vs obstruction   - D5% IVF 75cc x 24hrs   - f/u BMP  - Avoid Nephrotoxic agent.
p/w hematuria  hemodynamically stable in ED  No penile lesion  Bladder scan 120cc of urine   no leukocytosis     - f/u UA  - Monitor daily CBC    - Urology team consulted > recommended Chavez placement > pt refused Chavez initially  - Now agreed to Chavez on 7/10  - Chavez draining [place]wn color urine today  - Will try TOV in AM 7/12

## 2024-07-15 NOTE — PROGRESS NOTE ADULT - PROBLEM SELECTOR PLAN 4
- Pt has hx of stented coronary artey  - C/W Statin
- Pt has hx of stented coronary artery  - C/W Statin
H/o CAD sp stent   - C/w Atorvastatin
DVT ppx: SCD iso of hematuria

## 2024-07-15 NOTE — PROGRESS NOTE ADULT - PROBLEM SELECTOR PROBLEM 1
Anemia due to acute blood loss
Hematuria
Anemia due to acute blood loss
Anemia due to acute blood loss

## 2024-07-15 NOTE — PROGRESS NOTE ADULT - SUBJECTIVE AND OBJECTIVE BOX
Subjective  Purewick with cyu in cannister. Purewick half off, denies suprapubic pain. States hes pending rehab placement.     Objective    Vital signs  T(F): , Max: 98.9 (07-14-24 @ 14:26)  HR: 66 (07-15-24 @ 05:13)  BP: 137/72 (07-15-24 @ 05:13)  SpO2: 98% (07-15-24 @ 05:13)  Wt(kg): --    Output     OUT:    Voided (mL): 400 mL  Total OUT: 400 mL    Total NET: -400 mL          Gen: NAD  Abd: soft, nontender, nondistended  : purewick half off, draining CYU in cannister    Labs            Urine Cx: ?  Blood Cx: ?    Imaging

## 2024-07-15 NOTE — PROGRESS NOTE ADULT - SUBJECTIVE AND OBJECTIVE BOX
NP Note discussed with  primary attending    Patient is a 89y old  Male who presents with a chief complaint of Hematuria (15 Jul 2024 09:53)      INTERVAL HPI/OVERNIGHT EVENTS: No new complaints.  Denies pain or sight of blood in urine.  Agreeable to GREG.      MEDICATIONS  (STANDING):  atorvastatin 10 milliGRAM(s) Oral at bedtime  polyethylene glycol 3350 17 Gram(s) Oral daily  senna 2 Tablet(s) Oral at bedtime  tamsulosin      tamsulosin 0.4 milliGRAM(s) Oral at bedtime    MEDICATIONS  (PRN):  acetaminophen     Tablet .. 650 milliGRAM(s) Oral every 6 hours PRN Temp greater or equal to 38C (100.4F), Mild Pain (1 - 3)  bisacodyl 5 milliGRAM(s) Oral every 12 hours PRN Constipation      __________________________________________________  REVIEW OF SYSTEMS:    CONSTITUTIONAL: No fever  EYES: No acute visual disturbances  NECK: No pain or stiffness  RESPIRATORY: No cough; No shortness of breath  CARDIOVASCULAR: No chest pain, no palpitations  GASTROINTESTINAL: No pain. No nausea or vomiting.  No diarrhea   NEUROLOGICAL: No headache or numbness, no tremors  MUSCULOSKELETAL: No joint pain, no muscle pain  GENITOURINARY: No dysuria, no frequency, no hesitancy  PSYCHIATRY: No depression , no anxiety  ALL OTHER  ROS negative        Vital Signs Last 24 Hrs  T(C): 36.8 (15 Jul 2024 05:13), Max: 37.2 (14 Jul 2024 14:26)  T(F): 98.2 (15 Jul 2024 05:13), Max: 98.9 (14 Jul 2024 14:26)  HR: 66 (15 Jul 2024 05:13) (66 - 78)  BP: 137/72 (15 Jul 2024 05:13) (101/74 - 137/72)  BP(mean): 94 (15 Jul 2024 05:13) (94 - 94)  RR: 16 (15 Jul 2024 05:13) (16 - 16)  SpO2: 98% (15 Jul 2024 05:13) (98% - 98%)    Parameters below as of 15 Jul 2024 05:13  Patient On (Oxygen Delivery Method): room air        ________________________________________________  PHYSICAL EXAM:  GENERAL: NAD  HEENT: Normocephalic;  conjunctivae and sclerae clear; moist mucous membranes   NECK : Supple  CHEST/LUNG: Clear to auscultation bilaterally with good air entry   HEART: S1 S2  regular; no murmurs, gallops or rubs  ABDOMEN: Soft, Nontender, Nondistended; Bowel sounds present x 4 quad  EXTREMITIES: No cyanosis; no edema; no calf tenderness  SKIN: Warm and dry; no rash  NERVOUS SYSTEM:  Awake and alert; Oriented to place, person and time; no new deficits    _________________________________________________  LABS:              CAPILLARY BLOOD GLUCOSE            RADIOLOGY & ADDITIONAL TESTS:    Imaging Personally Reviewed:  YES    Consultant(s) Notes Reviewed:   YES    Care Discussed with Consultants :     Plan of care was discussed with patient and /or primary care giver; all questions and concerns were addressed and care was aligned with patient's wishes.

## 2024-07-15 NOTE — PROGRESS NOTE ADULT - ASSESSMENT
89 year old, M, with PMH of CAD s/p stent.  Present with hematuria.  As per ED note, patient noticed blood from urinary meatus since yesterday afternoon.  Patient states had similar episode in May and did not follow-up with Urologist.  Admitted for Hematuria.

## 2024-07-15 NOTE — PROGRESS NOTE ADULT - PROVIDER SPECIALTY LIST ADULT
Urology
Internal Medicine
Urology
Internal Medicine

## 2024-07-15 NOTE — PROGRESS NOTE ADULT - PROBLEM SELECTOR PLAN 3
p/w  SCr 1.44 on admission  Baseline SCr - 1.13 on 6/9/24  likely pre renal vs obstruction   - S/P D5% IVF 75cc x 24hrs   - Avoid Nephrotoxic agent.  - f/u BMP
- Pt has hx of stented coronary artey  - C/W Statin
- P/w SCr=1.44.  Baseline SCr=1.13 on 6/9/24  - Likely prerenal vs. obstruction   - S/p IVF   - Continue to avoid nephrotoxic drugs
p/w  SCr 1.44 on admission  Baseline SCr - 1.13 on 6/9/24  likely pre renal vs obstruction   - S/P D5% IVF 75cc x 24hrs   - Avoid Nephrotoxic agent.  - f/u BMP

## 2024-07-15 NOTE — PROGRESS NOTE ADULT - PROBLEM SELECTOR PLAN 5
Pt is from home  Dispo is pending resolution of hematuria
DVT ppx: SCD iso of hematuria
DVT ppx: SCD iso of hematuria
- C/w SCDs iso of recent hematuria

## 2024-07-15 NOTE — PROGRESS NOTE ADULT - ASSESSMENT
89y.o. Male with bleeding from urethral meatus. hematuria resolved. Chavez removed, purewick placed with cyu.    Recommendations:   - No acute urological interventions indicated  - Cr wnl  - Patient to follow up outpatient  - Pending GREG placement

## 2024-07-15 NOTE — PROGRESS NOTE ADULT - PROBLEM SELECTOR PLAN 6
Pt is from home  Dispo is pending resolution of hematuria
- Pt from home  - Pt rec GREG.  Awaiting Acceptance.  CM following.
Pt is from home  Dispo is pending TOV

## 2024-07-15 NOTE — PROGRESS NOTE ADULT - NUTRITIONAL ASSESSMENT
This patient has been assessed with a concern for Malnutrition and has been determined to have a diagnosis/diagnoses of Underweight (BMI < 19).    This patient is being managed with:   Diet Regular-  Consistent Carbohydrate {Evening Snacks}  DASH/TLC {Sodium & Cholesterol Restricted}  Entered: Jul 12 2024 12:37PM  

## 2024-07-15 NOTE — PROGRESS NOTE ADULT - PROBLEM SELECTOR PROBLEM 3
THOMAS (acute kidney injury)
CAD (coronary artery disease)
THOMAS (acute kidney injury)
THOMAS (acute kidney injury)

## 2024-07-16 NOTE — CHART NOTE - NSCHARTNOTEFT_GEN_A_CORE
Attempted to reach Ms. Kelsea Navarro ([phone], [phone]) to provide warm handoff for patient. Left voicemail with CB number. Will follow up.

## 2024-07-16 NOTE — CHART NOTE - NSCHARTNOTESELECT_GEN_ALL_CORE
D/C Appointment/Event Note
Follow up on CBC/Event Note
Bladder scan follow uo/Event Note
Event Note
[place]/Event Note

## 2024-07-29 ENCOUNTER — APPOINTMENT (OUTPATIENT)
Dept: UROLOGY | Facility: CLINIC | Age: 89
End: 2024-07-29

## 2024-10-14 ENCOUNTER — INPATIENT (INPATIENT)
Facility: HOSPITAL | Age: 89
LOS: 2 days | Discharge: HOME | DRG: 93 | End: 2024-10-17
Attending: INTERNAL MEDICINE | Admitting: INTERNAL MEDICINE
Payer: MEDICARE

## 2024-10-14 VITALS
OXYGEN SATURATION: 98 % | RESPIRATION RATE: 16 BRPM | DIASTOLIC BLOOD PRESSURE: 79 MMHG | HEART RATE: 84 BPM | TEMPERATURE: 98 F | WEIGHT: 119.93 LBS | SYSTOLIC BLOOD PRESSURE: 129 MMHG

## 2024-10-14 DIAGNOSIS — Z90.49 ACQUIRED ABSENCE OF OTHER SPECIFIED PARTS OF DIGESTIVE TRACT: Chronic | ICD-10-CM

## 2024-10-14 DIAGNOSIS — R26.81 UNSTEADINESS ON FEET: ICD-10-CM

## 2024-10-14 LAB
ALBUMIN SERPL ELPH-MCNC: 3 G/DL — LOW (ref 3.5–5)
ALP SERPL-CCNC: 69 U/L — SIGNIFICANT CHANGE UP (ref 40–120)
ALT FLD-CCNC: 44 U/L DA — SIGNIFICANT CHANGE UP (ref 10–60)
ANION GAP SERPL CALC-SCNC: 5 MMOL/L — SIGNIFICANT CHANGE UP (ref 5–17)
AST SERPL-CCNC: 26 U/L — SIGNIFICANT CHANGE UP (ref 10–40)
BASOPHILS # BLD AUTO: 0.03 K/UL — SIGNIFICANT CHANGE UP (ref 0–0.2)
BASOPHILS NFR BLD AUTO: 0.7 % — SIGNIFICANT CHANGE UP (ref 0–2)
BILIRUB SERPL-MCNC: 0.6 MG/DL — SIGNIFICANT CHANGE UP (ref 0.2–1.2)
BUN SERPL-MCNC: 52 MG/DL — HIGH (ref 7–18)
CALCIUM SERPL-MCNC: 8.7 MG/DL — SIGNIFICANT CHANGE UP (ref 8.4–10.5)
CHLORIDE SERPL-SCNC: 102 MMOL/L — SIGNIFICANT CHANGE UP (ref 96–108)
CO2 SERPL-SCNC: 26 MMOL/L — SIGNIFICANT CHANGE UP (ref 22–31)
CREAT SERPL-MCNC: 1.15 MG/DL — SIGNIFICANT CHANGE UP (ref 0.5–1.3)
EGFR: 58 ML/MIN/1.73M2 — LOW
EOSINOPHIL # BLD AUTO: 0.03 K/UL — SIGNIFICANT CHANGE UP (ref 0–0.5)
EOSINOPHIL NFR BLD AUTO: 0.7 % — SIGNIFICANT CHANGE UP (ref 0–6)
GLUCOSE SERPL-MCNC: 103 MG/DL — HIGH (ref 70–99)
HCT VFR BLD CALC: 29.4 % — LOW (ref 39–50)
HGB BLD-MCNC: 9.2 G/DL — LOW (ref 13–17)
IMM GRANULOCYTES NFR BLD AUTO: 0.5 % — SIGNIFICANT CHANGE UP (ref 0–0.9)
LYMPHOCYTES # BLD AUTO: 1.36 K/UL — SIGNIFICANT CHANGE UP (ref 1–3.3)
LYMPHOCYTES # BLD AUTO: 32.4 % — SIGNIFICANT CHANGE UP (ref 13–44)
MCHC RBC-ENTMCNC: 26.7 PG — LOW (ref 27–34)
MCHC RBC-ENTMCNC: 31.3 GM/DL — LOW (ref 32–36)
MCV RBC AUTO: 85.5 FL — SIGNIFICANT CHANGE UP (ref 80–100)
MONOCYTES # BLD AUTO: 0.48 K/UL — SIGNIFICANT CHANGE UP (ref 0–0.9)
MONOCYTES NFR BLD AUTO: 11.4 % — SIGNIFICANT CHANGE UP (ref 2–14)
NEUTROPHILS # BLD AUTO: 2.28 K/UL — SIGNIFICANT CHANGE UP (ref 1.8–7.4)
NEUTROPHILS NFR BLD AUTO: 54.3 % — SIGNIFICANT CHANGE UP (ref 43–77)
NRBC # BLD: 0 /100 WBCS — SIGNIFICANT CHANGE UP (ref 0–0)
PLATELET # BLD AUTO: 155 K/UL — SIGNIFICANT CHANGE UP (ref 150–400)
POTASSIUM SERPL-MCNC: 4.7 MMOL/L — SIGNIFICANT CHANGE UP (ref 3.5–5.3)
POTASSIUM SERPL-SCNC: 4.7 MMOL/L — SIGNIFICANT CHANGE UP (ref 3.5–5.3)
PROT SERPL-MCNC: 7.2 G/DL — SIGNIFICANT CHANGE UP (ref 6–8.3)
RBC # BLD: 3.44 M/UL — LOW (ref 4.2–5.8)
RBC # FLD: 16.1 % — HIGH (ref 10.3–14.5)
SODIUM SERPL-SCNC: 133 MMOL/L — LOW (ref 135–145)
WBC # BLD: 4.2 K/UL — SIGNIFICANT CHANGE UP (ref 3.8–10.5)
WBC # FLD AUTO: 4.2 K/UL — SIGNIFICANT CHANGE UP (ref 3.8–10.5)

## 2024-10-14 PROCEDURE — 99285 EMERGENCY DEPT VISIT HI MDM: CPT

## 2024-10-14 RX ORDER — ALOGLIPTIN 25 MG/1
1 TABLET, FILM COATED ORAL
Refills: 0 | DISCHARGE

## 2024-10-14 RX ORDER — METOPROLOL TARTRATE 50 MG
50 TABLET ORAL DAILY
Refills: 0 | Status: DISCONTINUED | OUTPATIENT
Start: 2024-10-14 | End: 2024-10-17

## 2024-10-14 RX ORDER — LOSARTAN POTASSIUM 100 MG/1
100 TABLET, FILM COATED ORAL DAILY
Refills: 0 | Status: DISCONTINUED | OUTPATIENT
Start: 2024-10-14 | End: 2024-10-17

## 2024-10-14 RX ORDER — INSULIN LISPRO 100/ML
VIAL (ML) SUBCUTANEOUS
Refills: 0 | Status: DISCONTINUED | OUTPATIENT
Start: 2024-10-14 | End: 2024-10-17

## 2024-10-14 RX ORDER — TAMSULOSIN HCL 0.4 MG
1 CAPSULE ORAL
Refills: 0 | DISCHARGE

## 2024-10-14 RX ORDER — INSULIN LISPRO 100/ML
VIAL (ML) SUBCUTANEOUS AT BEDTIME
Refills: 0 | Status: DISCONTINUED | OUTPATIENT
Start: 2024-10-14 | End: 2024-10-17

## 2024-10-14 RX ORDER — ACETAMINOPHEN 325 MG
1 TABLET ORAL
Refills: 0 | DISCHARGE

## 2024-10-14 RX ORDER — TELMISARTAN 80 MG/1
1 TABLET ORAL
Refills: 0 | DISCHARGE

## 2024-10-14 RX ORDER — MULTI VITAMIN/MINERAL SUPPLEMENT WITH ASCORBIC ACID, NIACIN, PYRIDOXINE, PANTOTHENIC ACID, FOLIC ACID, RIBOFLAVIN, THIAMIN, BIOTIN, COBALAMIN AND ZINC. 60; 20; 12.5; 10; 10; 1.7; 1.5; 1; .3; .006 MG/1; MG/1; MG/1; MG/1; MG/1; MG/1; MG/1; MG/1; MG/1; MG/1
1 TABLET, COATED ORAL
Refills: 0 | DISCHARGE

## 2024-10-14 RX ORDER — ZINC SULFATE 25 MG/5ML
220 INJECTION, SOLUTION INTRAVENOUS DAILY
Refills: 0 | Status: DISCONTINUED | OUTPATIENT
Start: 2024-10-14 | End: 2024-10-17

## 2024-10-14 RX ORDER — TAMSULOSIN HCL 0.4 MG
0.4 CAPSULE ORAL AT BEDTIME
Refills: 0 | Status: DISCONTINUED | OUTPATIENT
Start: 2024-10-14 | End: 2024-10-17

## 2024-10-14 RX ORDER — METOPROLOL TARTRATE 50 MG
1 TABLET ORAL
Refills: 0 | DISCHARGE

## 2024-10-14 RX ORDER — ENOXAPARIN SODIUM 150 MG/ML
30 INJECTION SUBCUTANEOUS EVERY 24 HOURS
Refills: 0 | Status: DISCONTINUED | OUTPATIENT
Start: 2024-10-14 | End: 2024-10-17

## 2024-10-14 RX ORDER — MULTI VITAMIN/MINERAL SUPPLEMENT WITH ASCORBIC ACID, NIACIN, PYRIDOXINE, PANTOTHENIC ACID, FOLIC ACID, RIBOFLAVIN, THIAMIN, BIOTIN, COBALAMIN AND ZINC. 60; 20; 12.5; 10; 10; 1.7; 1.5; 1; .3; .006 MG/1; MG/1; MG/1; MG/1; MG/1; MG/1; MG/1; MG/1; MG/1; MG/1
1 TABLET, COATED ORAL DAILY
Refills: 0 | Status: DISCONTINUED | OUTPATIENT
Start: 2024-10-14 | End: 2024-10-17

## 2024-10-14 RX ORDER — ZINC SULFATE 25 MG/5ML
220 INJECTION, SOLUTION INTRAVENOUS
Refills: 0 | DISCHARGE

## 2024-10-14 NOTE — ED PROVIDER NOTE - CPE EDP CARDIAC NORM
[facility] AMIE  424/  Consultation Note   Patient: William Fong  Today's Date: 2024    YOB: 1959  Admission Date: 2024    MRN: [id number]  Date of Service: 2024   Requesting Provider: Bentley De La Rosa MD  Hospital Day: Hospital Day: 1        HISTORY   Past Medical History  Surgical History  Family History Social History       Chief Complaint / Reason for consult: This patient is being seen at the request of Bentley De La Rosa MD for the management of medical issues hypertension and diabetes.     History of Present Illness: 64yo  male with diabetes mellitus, mixed hyperlipidemia, obesity, obstructive sleep apnea on CPAP, type 2 diabetes mellitus with elective L2-3 laminectomy with facetectomy and posterior instrumentation with Dr. De La Rosa.  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  Leading for an elective surgery for.  He denies any fevers operatively.  He states overall tired but feeling well.  Denies any chest pain, nausea, vomiting.  No bowel movement, no appetite yet.        Histories:     I have personally reviewed and updated the following EPIC sections: Past Medical History, Past Surgical History, Social History, Family History, Current medications,Allergies    Medications and Allergies   Prior to Admission Medications    Allergies        Review of Systems:     Complete ROS performed and negative except as documented in HPI.    Objective   PHYSICAL EXAMINATION     Vital 24 Hour Range Most Recent Value   Temperature Temp  Min: 97 °F (36.1 °C)  Max: 97.6 °F (36.4 °C) 97.6 °F (36.4 °C)   Pulse Pulse  Min: 77  Max: 94 92   Respiratory Resp  Min: 10  Max: 16 16   Blood Pressure BP  Min: 141/87  Max: 170/86 (!) 170/86   Pulse Oximetry SpO2  Min: 90 %  Max: 96 % 91 %   Arterial BP No data recorded     O2 O2 Flow Rate (L/min)  Av.6 L/min  Min: 2 L/min   Min taken time: 24 1130  Max: 6 L/min   Max taken time:  09/13/24 1015       Recorded Intake and Output:  Intake/Output Summary (Last 24 hours) at 9/13/2024 1145  Last data filed at 9/13/2024 1003  Gross per 24 hour   Intake 1500 ml   Output --   Net 1500 ml      Recorded Last Stool Occurrence:         Vital Most Recent Value First Value   Weight 104 kg (229 lb 3.2 oz) Weight: 104 kg (229 lb 3.2 oz)   Height 5' 8\" (172.7 cm) Height: 5' 8\" (172.7 cm)   BMI 34.85 N/A     General:  No acute distress.  Skin:  Warm and dry without rash.  HEENT: Normocephalic, atraumatic, PERRL, intact extraocular movement.  Neck:  Trachea is midline. No adenopathy.    Cardiovascular:  Regular rate and rhythm, normal S1, S2, no added sounds or murmurs.  Respiratory: Clear to auscultation bilaterally.  No wheezes, rales or rhonchi.  Gastrointestinal:  Soft, nontender and nondistended, no hepatomegaly or splenomegaly. bowel sounds present.  Neuro:   Alert and Oriented to time, place, person. CN II-XII intact. no focal weakness or sensory deficits.  Psychiatric:   Cooperative.  Appropriate mood & affect.  Normal judgment.  Extremities: No pitting edema of the lower extremities bilaterally, distal pulses intact.      TEST RESULTS  Labs Since Admission  Micro Summary  Imaging       Labs: labs have been reviewed and pertinent findings discussed in the Assessment and Plan. Laboratory values: No results found      No results found     Recent Labs   Lab 09/13/24  0646 09/13/24  1015 09/13/24  1135   GLUCOSE BEDSIDE 160* 191* 202*         Radiology: Imaging studies have been reviewed and pertinent findings discussed in the Assessment and Plan.  No results found for any visits on 09/13/24 (from the past 48 hour(s)).        ASSESSMENT AND PLAN     #. Type II DM  - ISS  - hold oral antiglycemics. Only on metformin at home.   - lantus 10 units.   - accuchecks  - last A1C 5.8  - regular diet is fine.   - avoid hypoglycemia    #. CHRIS  - home CPAP hs if doesn't have his offer hospital CPAP with home settings.      #. Essential hypertension  - restart home meds    #. Back pain  #. L2-3 laminectomy  - per orthopedic spinal surgery  - pain management per orthopedic spinal surgery.         VTE Prophylaxis :         Fermin Jacques DO     normal...

## 2024-10-14 NOTE — H&P ADULT - PROBLEM SELECTOR PLAN 1
was sent to rehab after a fall 2 weeks ago  has h/o estelita fall  here because pt does not want to stay in rehab and wants to go home    -PT consulted  -Aspiration and fall precaution

## 2024-10-14 NOTE — ED ADULT TRIAGE NOTE - CHIEF COMPLAINT QUOTE
patient denies any complains, wants to be transferred out from [facility] home

## 2024-10-14 NOTE — ED PROVIDER NOTE - OBJECTIVE STATEMENT
89-year-old  male with a past medical history of frequent falls, BPH, thrombocytopenia, PVD, diabetes, CAD, hypertension, hyperlipidemia presents from a rehab facility stating he does not want a go back to the rehab facility and he would like to go back to his regular home.  States that he was not able to do this from the rehab facility and was told he needed to come to the hospital first.  Has no complaints.

## 2024-10-14 NOTE — ED PROVIDER NOTE - CLINICAL SUMMARY MEDICAL DECISION MAKING FREE TEXT BOX
86-year-old male from rehab but wants to go back home.  PE as above   labs, admission for social eval.

## 2024-10-14 NOTE — H&P ADULT - HISTORY OF PRESENT ILLNESS
89y/M, from [facility], with PMH of  HTN, HLD, DM, PVD, CAD s/p stent, BPH was sent to [facility] from [facility] after a fall 2 weeks ago. Patient does not want to stay in and wants to go home, he was told to go to hospital for evaluation before they can send him home, that why patient is here. he was in rehab for almost a week. No active compliants at the time. Denies fever, chills, headache, chest pain, SOB, abd pain, bowel or bladder symptoms.  States he lives alone at home, independent in ADL, ambulates with cane.

## 2024-10-14 NOTE — H&P ADULT - ASSESSMENT
89y/M, from [facility], with PMH of  HTN, HLD, DM, PVD, CAD s/p stent, BPH was sent to [facility] from [facility] after a fall 2 weeks ago. Patient does not want to stay in and wants to go home, he was told to go to hospital for evaluation before they can send him home, that why patient is here. Admitted for PT evaluation regarding placement

## 2024-10-14 NOTE — H&P ADULT - NSHPPHYSICALEXAM_GEN_ALL_CORE
T(C): 36.3 (10-15-24 @ 01:18), Max: 36.4 (10-14-24 @ 18:38)  HR: 61 (10-15-24 @ 01:18) (61 - 84)  BP: 127/68 (10-15-24 @ 01:18) (126/78 - 129/79)  RR: 17 (10-15-24 @ 01:18) (16 - 19)  SpO2: 99% (10-15-24 @ 01:18) (98% - 99%)    GENERAL: NAD, speaks in full sentences, no signs of respiratory distress  HEAD:  Atraumatic, Normocephalic  EYES: EOMI, PERRLA, conjunctiva and sclera clear  NECK: Supple, No JVD  CHEST/LUNG: Clear to auscultation bilaterally; No wheeze; No crackles; No accessory muscles used  HEART: Regular rate and rhythm; No murmurs;   ABDOMEN: Soft, Nontender, Nondistended; Bowel sounds present; No guarding  EXTREMITIES:  2+ Peripheral Pulses, No cyanosis or edema  PSYCH: AAOx3  NEUROLOGY: No motor or sensory deficit  SKIN: No rashes or lesions

## 2024-10-15 DIAGNOSIS — R29.6 REPEATED FALLS: ICD-10-CM

## 2024-10-15 DIAGNOSIS — E78.5 HYPERLIPIDEMIA, UNSPECIFIED: ICD-10-CM

## 2024-10-15 DIAGNOSIS — N40.0 BENIGN PROSTATIC HYPERPLASIA WITHOUT LOWER URINARY TRACT SYMPTOMS: ICD-10-CM

## 2024-10-15 DIAGNOSIS — I25.10 ATHEROSCLEROTIC HEART DISEASE OF NATIVE CORONARY ARTERY WITHOUT ANGINA PECTORIS: ICD-10-CM

## 2024-10-15 DIAGNOSIS — Z29.9 ENCOUNTER FOR PROPHYLACTIC MEASURES, UNSPECIFIED: ICD-10-CM

## 2024-10-15 DIAGNOSIS — E44.1 MILD PROTEIN-CALORIE MALNUTRITION: ICD-10-CM

## 2024-10-15 DIAGNOSIS — I10 ESSENTIAL (PRIMARY) HYPERTENSION: ICD-10-CM

## 2024-10-15 DIAGNOSIS — E11.9 TYPE 2 DIABETES MELLITUS WITHOUT COMPLICATIONS: ICD-10-CM

## 2024-10-15 DIAGNOSIS — D63.8 ANEMIA IN OTHER CHRONIC DISEASES CLASSIFIED ELSEWHERE: ICD-10-CM

## 2024-10-15 LAB
GLUCOSE BLDC GLUCOMTR-MCNC: 106 MG/DL — HIGH (ref 70–99)
GLUCOSE BLDC GLUCOMTR-MCNC: 108 MG/DL — HIGH (ref 70–99)
GLUCOSE BLDC GLUCOMTR-MCNC: 116 MG/DL — HIGH (ref 70–99)

## 2024-10-15 RX ORDER — INFLUENZA VIRUS VACCINE 15; 15; 15; 15 UG/.5ML; UG/.5ML; UG/.5ML; UG/.5ML
0.5 SUSPENSION INTRAMUSCULAR ONCE
Refills: 0 | Status: ACTIVE | OUTPATIENT
Start: 2024-10-15 | End: 2025-09-13

## 2024-10-15 RX ADMIN — ZINC SULFATE 220 MILLIGRAM(S): 25 INJECTION, SOLUTION INTRAVENOUS at 14:06

## 2024-10-15 RX ADMIN — MULTI VITAMIN/MINERAL SUPPLEMENT WITH ASCORBIC ACID, NIACIN, PYRIDOXINE, PANTOTHENIC ACID, FOLIC ACID, RIBOFLAVIN, THIAMIN, BIOTIN, COBALAMIN AND ZINC. 1 TABLET(S): 60; 20; 12.5; 10; 10; 1.7; 1.5; 1; .3; .006 TABLET, COATED ORAL at 14:07

## 2024-10-15 RX ADMIN — Medication 50 MILLIGRAM(S): at 05:50

## 2024-10-15 RX ADMIN — Medication 500 MILLIGRAM(S): at 14:06

## 2024-10-15 RX ADMIN — LOSARTAN POTASSIUM 100 MILLIGRAM(S): 100 TABLET, FILM COATED ORAL at 05:54

## 2024-10-15 RX ADMIN — Medication 75 MILLIGRAM(S): at 14:06

## 2024-10-15 RX ADMIN — ENOXAPARIN SODIUM 30 MILLIGRAM(S): 150 INJECTION SUBCUTANEOUS at 05:54

## 2024-10-15 NOTE — DIETITIAN INITIAL EVALUATION ADULT - PERTINENT LABORATORY DATA
10-14    133[L]  |  102  |  52[H]  ----------------------------<  103[H]  4.7   |  26  |  1.15    Ca    8.7      14 Oct 2024 21:11    TPro  7.2  /  Alb  3.0[L]  /  TBili  0.6  /  DBili  x   /  AST  26  /  ALT  44  /  AlkPhos  69  10-14  POCT Blood Glucose.: 106 mg/dL (10-15-24 @ 11:40)  A1C with Estimated Average Glucose Result: 5.6 % (06-29-24 @ 07:00)

## 2024-10-15 NOTE — DIETITIAN INITIAL EVALUATION ADULT - PERTINENT MEDS FT
MEDICATIONS  (STANDING):  ascorbic acid 500 milliGRAM(s) Oral daily  clopidogrel Tablet 75 milliGRAM(s) Oral daily  enoxaparin Injectable 30 milliGRAM(s) SubCutaneous every 24 hours  influenza  Vaccine (HIGH DOSE) 0.5 milliLiter(s) IntraMuscular once  insulin lispro (ADMELOG) corrective regimen sliding scale   SubCutaneous at bedtime  insulin lispro (ADMELOG) corrective regimen sliding scale   SubCutaneous three times a day before meals  losartan 100 milliGRAM(s) Oral daily  metoprolol succinate ER 50 milliGRAM(s) Oral daily  multivitamin 1 Tablet(s) Oral daily  tamsulosin 0.4 milliGRAM(s) Oral at bedtime  zinc sulfate 220 milliGRAM(s) Oral daily    MEDICATIONS  (PRN):

## 2024-10-15 NOTE — DIETITIAN INITIAL EVALUATION ADULT - NS FNS DIET ORDER
Diet, Regular:   Consistent Carbohydrate {Evening Snacks}  DASH/TLC {Sodium & Cholesterol Restricted}  Supplement Feeding Modality:  Oral  Glucerna Shake Cans or Servings Per Day:  1       Frequency:  Three Times a day (10-15-24 @ 10:08)

## 2024-10-15 NOTE — PATIENT PROFILE ADULT - FALL HARM RISK - HARM RISK INTERVENTIONS

## 2024-10-15 NOTE — PHYSICAL THERAPY INITIAL EVALUATION ADULT - GENERAL OBSERVATIONS, REHAB EVAL
Consult received,EMR, radiology and labs reviewed. Patient received OOB in a chair , [place]- cooperative and motivated. Patient agreed to EVALUATION from Physical Therapist.

## 2024-10-15 NOTE — PROGRESS NOTE ADULT - ASSESSMENT
89 y/o M, from [facility], with PMH of  HTN, HLD, DM, PVD, CAD s/p stent, BPH was sent to [facility] from [facility] after a fall 2 weeks ago. Patient does not want to stay in and wants to go home, he was told to go to hospital for evaluation before they can send him home. Admitted for PT evaluation regarding placement.

## 2024-10-15 NOTE — DIETITIAN INITIAL EVALUATION ADULT - ORAL INTAKE PTA/DIET HISTORY
Spoke to pt at bedside, pt is hard of hearing. Pt reported no new food allergies or intolerances. Pt does not take any vitamins or supplements at home. Pt's intake was good PTA. HIE: 118 lbs - 10/5/23  Nutrition interview: No recent episodes of nausea, vomiting, diarrhea or constipation per pt. Denies any chewing/swallowing difficulties. Intake is % per pt, pt states he is still hungry after breakfast, made pt aware he is able to attain second portion if he chooses. Food preferences explored and forwarded to dietary. Hyponatremia (133).

## 2024-10-15 NOTE — PROGRESS NOTE ADULT - PROBLEM SELECTOR PLAN 1
has h/o mulitple fall  was sent to rehab after a fall 2 weeks ago  admitted due to pt does not want to stay in rehab and wants to go home  -PT consulted  -Aspiration and fall precaution  -f/u a1c, B6, B12, vitamin D

## 2024-10-15 NOTE — PROGRESS NOTE ADULT - PROBLEM SELECTOR PLAN 6
albumin 3.0  add glucerna tid hgb 9.2  no signs of active bleeding  f/u folate serum, b12 serum, iron studies

## 2024-10-15 NOTE — ED ADULT NURSE NOTE - NSFALLHARMRISKINTERV_ED_ALL_ED
Assistance OOB with selected safe patient handling equipment if applicable/Assistance with ambulation/Communicate risk of Fall with Harm to all staff, patient, and family/Monitor for mental status changes and reorient to person, place, and time, as needed/Provide visual cue: red socks, yellow wristband, yellow gown, etc/Reinforce activity limits and safety measures with patient and family/Bed in lowest position, wheels locked, appropriate side rails in place/Call bell, personal items and telephone in reach/Instruct patient to call for assistance before getting out of bed/chair/stretcher/Non-slip footwear applied when patient is off stretcher/[place] to call system/Physically safe environment - no spills, clutter or unnecessary equipment/Purposeful Proactive Rounding/Room/bathroom lighting operational, light cord in reach

## 2024-10-15 NOTE — ED ADULT NURSE NOTE - NSFALLRISKASMTTYPE_ED_ALL_ED
Sly Piper    Day of procedure: 4/15/19  Registration time: 5:30am, [facility] [place]    You will need a ride home. There is possibility of extended period of bedrest and/or possibility of overnight stay     Please bring the list of your current medications  and a copy of your Advanced Directive (Living Will/Power of ).    Medication instructions: Do not take the following medications the day before or the day of procedure:     Viagra and Lozol     Dye allergy instructions:    Not applicable    Blood thinner medication instructions:   Not Applicable     Diabetic medication: Do not take the following medication two days before procedure, the day of procedure, or two days after procedure:     Not Applicable     Insulin instructions:      Not Applicable    Fasting instructions  Fasting - DO NOT eat or drink (including water and medications) after MIDNIGHT on the day prior to your procedure.    Lab work needs to be done within 30 days of your procedure.    If you should have any questions, please call our office: [phone].    Initial (On Arrival)

## 2024-10-16 ENCOUNTER — TRANSCRIPTION ENCOUNTER (OUTPATIENT)
Age: 89
End: 2024-10-16

## 2024-10-16 LAB
24R-OH-CALCIDIOL SERPL-MCNC: 31.5 NG/ML — SIGNIFICANT CHANGE UP (ref 30–80)
A1C WITH ESTIMATED AVERAGE GLUCOSE RESULT: 5.4 % — SIGNIFICANT CHANGE UP (ref 4–5.6)
CHOLEST SERPL-MCNC: 105 MG/DL — SIGNIFICANT CHANGE UP
ESTIMATED AVERAGE GLUCOSE: 108 MG/DL — SIGNIFICANT CHANGE UP (ref 68–114)
FERRITIN SERPL-MCNC: 62 NG/ML — SIGNIFICANT CHANGE UP (ref 30–400)
FOLATE SERPL-MCNC: 10.8 NG/ML — SIGNIFICANT CHANGE UP
GLUCOSE BLDC GLUCOMTR-MCNC: 109 MG/DL — HIGH (ref 70–99)
GLUCOSE BLDC GLUCOMTR-MCNC: 135 MG/DL — HIGH (ref 70–99)
GLUCOSE BLDC GLUCOMTR-MCNC: 146 MG/DL — HIGH (ref 70–99)
GLUCOSE BLDC GLUCOMTR-MCNC: 90 MG/DL — SIGNIFICANT CHANGE UP (ref 70–99)
HDLC SERPL-MCNC: 34 MG/DL — LOW
IRON SATN MFR SERPL: 14 % — LOW (ref 20–55)
IRON SATN MFR SERPL: 36 UG/DL — LOW (ref 65–170)
LIPID PNL WITH DIRECT LDL SERPL: 63 MG/DL — SIGNIFICANT CHANGE UP
NON HDL CHOLESTEROL: 71 MG/DL — SIGNIFICANT CHANGE UP
TIBC SERPL-MCNC: 258 UG/DL — SIGNIFICANT CHANGE UP (ref 250–450)
TRIGL SERPL-MCNC: 41 MG/DL — SIGNIFICANT CHANGE UP
UIBC SERPL-MCNC: 222 UG/DL — SIGNIFICANT CHANGE UP (ref 110–370)
VIT B12 SERPL-MCNC: 653 PG/ML — SIGNIFICANT CHANGE UP (ref 232–1245)

## 2024-10-16 RX ADMIN — MULTI VITAMIN/MINERAL SUPPLEMENT WITH ASCORBIC ACID, NIACIN, PYRIDOXINE, PANTOTHENIC ACID, FOLIC ACID, RIBOFLAVIN, THIAMIN, BIOTIN, COBALAMIN AND ZINC. 1 TABLET(S): 60; 20; 12.5; 10; 10; 1.7; 1.5; 1; .3; .006 TABLET, COATED ORAL at 12:40

## 2024-10-16 RX ADMIN — ZINC SULFATE 220 MILLIGRAM(S): 25 INJECTION, SOLUTION INTRAVENOUS at 12:40

## 2024-10-16 RX ADMIN — Medication 75 MILLIGRAM(S): at 12:41

## 2024-10-16 RX ADMIN — LOSARTAN POTASSIUM 100 MILLIGRAM(S): 100 TABLET, FILM COATED ORAL at 05:32

## 2024-10-16 RX ADMIN — Medication 500 MILLIGRAM(S): at 12:40

## 2024-10-16 RX ADMIN — Medication 50 MILLIGRAM(S): at 05:32

## 2024-10-16 NOTE — DISCHARGE NOTE PROVIDER - CARE PROVIDER_API CALL
Damion Hamilton  Cardiovascular Disease  [address] [place] [address]  Phone: [phone]  Fax: [phone]  Follow Up Time: 1 week

## 2024-10-16 NOTE — DISCHARGE NOTE PROVIDER - CARE PROVIDERS DIRECT ADDRESSES
[email or web address]

## 2024-10-16 NOTE — DISCHARGE NOTE PROVIDER - HOSPITAL COURSE
89 y/o M, uses walker from [facility], with PMH of  HTN, HLD, DM, PVD, CAD s/p stent, BPH was sent to [facility] from [facility] after a fall 2 weeks ago. Patient does not want to stay in and wants to go home, he was told to go to hospital for evaluation before they can send him home.   Physical therapy eval still recommended Rehab however pt is refusing.   CM and SW following, pt is medically optimized for discharge home, will need walker, and home care services.    89 y/o M, uses walker from [facility], with PMH of  HTN, HLD, DM, PVD, CAD s/p stent, BPH was sent to [facility] from [facility] after a fall 2 weeks ago. Patient does not want to stay in and wants to go home, he was told to go to hospital for evaluation before they can send him home.   Physical therapy eval still recommended Rehab however pt is refusing.     CM and SW following for dispo- Walker provided to pt at bedside  Pt is medically optimized for discharge home with Home PT and home care services. Discussed plan with Attending.   Please note that this a brief summary of hospital course please refer to daily progress notes and consult notes for full course and events

## 2024-10-16 NOTE — DISCHARGE NOTE PROVIDER - NSDCMRMEDTOKEN_GEN_ALL_CORE_FT
acetaminophen 650 mg oral tablet: 1 tab(s) orally once a day  alogliptin 25 mg oral tablet: 1 tab(s) orally once a day  ascorbic acid: 500 milligram(s) orally once a day  metoprolol succinate 50 mg oral tablet, extended release: 1 tab(s) orally once a day  multivitamin: 1 tab(s) orally once a day  pioglitazone 15 mg oral tablet: 1 tab(s) orally once a day  Plavix 75 mg oral tablet: 1 tab(s) orally once a day  tamsulosin 0.4 mg oral capsule: 1 cap(s) orally once a day (at bedtime)  telmisartan 80 mg oral tablet: 1 tab(s) orally once a day  zinc sulfate: 220 milligram(s) orally once a day

## 2024-10-16 NOTE — DISCHARGE NOTE PROVIDER - NSDCFUADDAPPT_GEN_ALL_CORE_FT
APPTS ARE READY TO BE MADE: [X] YES    Best Family or Patient Contact (if needed): Patient Morton Goldberg @ [phone] or Sister Stefanie Carrasquillo @ [phone]    Additional Information about above appointments (if needed):    1: Dr. Hamilton routine follow up in 1-2 weeks    Other comments or requests: Patient is hard of hearing    APPTS ARE READY TO BE MADE: [X] YES    Best Family or Patient Contact (if needed): Patient Morton Goldberg @ [phone] or Sister Stefanie Carrasquillo @ [phone]    Additional Information about above appointments (if needed):    1: Dr. Hamilton routine follow up in 1-2 weeks    Other comments or requests: Patient is hard of hearing   Patient was outreached on 10/23 and 10/25, but did not answer nor could a voicemail be left.     Patient's sister was outreached on 10/25 but did not answer. A voicemail was left for the patient to return our call.    Family member answered on behalf of the patient on 10/23 and advised they will pass forward our details for the patient to return our call.

## 2024-10-16 NOTE — PROGRESS NOTE ADULT - SUBJECTIVE AND OBJECTIVE BOX
INTERVAL HPI/OVERNIGHT EVENTS:  Patient seen,events noticed,no acute issues  VITAL SIGNS:  T(F): 97.2 (10-15-24 @ 05:35)  HR: 66 (10-15-24 @ 05:35)  BP: 128/56 (10-15-24 @ 05:35)  RR: 18 (10-15-24 @ 05:35)  SpO2: 98% (10-15-24 @ 05:35)  Wt(kg): --    PHYSICAL EXAM:  awake  Constitutional:  Eyes:  ENMT:perrla  Neck:  Respiratory:clear  Cardiovascular:s1s2,m-none  Gastrointestinal:soft,bs pos  Extremities:  Vascular:  Neurological:no focal defcit  Musculoskeletal:    MEDICATIONS  (STANDING):  ascorbic acid 500 milliGRAM(s) Oral daily  clopidogrel Tablet 75 milliGRAM(s) Oral daily  enoxaparin Injectable 30 milliGRAM(s) SubCutaneous every 24 hours  influenza  Vaccine (HIGH DOSE) 0.5 milliLiter(s) IntraMuscular once  insulin lispro (ADMELOG) corrective regimen sliding scale   SubCutaneous three times a day before meals  insulin lispro (ADMELOG) corrective regimen sliding scale   SubCutaneous at bedtime  losartan 100 milliGRAM(s) Oral daily  metoprolol succinate ER 50 milliGRAM(s) Oral daily  multivitamin 1 Tablet(s) Oral daily  tamsulosin 0.4 milliGRAM(s) Oral at bedtime  zinc sulfate 220 milliGRAM(s) Oral daily    MEDICATIONS  (PRN):      Allergies    penicillin (Rash)    Intolerances        LABS:                        9.2    4.20  )-----------( 155      ( 14 Oct 2024 21:11 )             29.4     10-14    133[L]  |  102  |  52[H]  ----------------------------<  103[H]  4.7   |  26  |  1.15    Ca    8.7      14 Oct 2024 21:11    TPro  7.2  /  Alb  3.0[L]  /  TBili  0.6  /  DBili  x   /  AST  26  /  ALT  44  /  AlkPhos  69  10-14      Urinalysis Basic - ( 14 Oct 2024 21:11 )    Color: x / Appearance: x / SG: x / pH: x  Gluc: 103 mg/dL / Ketone: x  / Bili: x / Urobili: x   Blood: x / Protein: x / Nitrite: x   Leuk Esterase: x / RBC: x / WBC x   Sq Epi: x / Non Sq Epi: x / Bacteria: x        RADIOLOGY & ADDITIONAL TESTS:       Assessment:  · Assessment	  89y/M, from [facility], with PMH of  HTN, HLD, DM, PVD, CAD s/p stent, BPH was sent to [facility] from [facility] after a fall 2 weeks ago. Patient does not want to stay in and wants to go home, he was told to go to hospital for evaluation before they can send him home, that why patient is here. Admitted for PT evaluation regarding placement         Problem/Plan - 1:  ·  Problem: Multiple falls.   ·  Plan: was sent to rehab after a fall 2 weeks ago  has h/o mulitple fall  here because pt does not want to stay in rehab and wants to go home  -PT consulted  -Aspiration and fall precaution.     Problem/Plan - 2:  ·  Problem: HTN (hypertension).   ·  Plan: on Telmisartan and metoprolol at home  c/w home meds with holding parameters.     Problem/Plan - 3:  ·  Problem: DM (diabetes mellitus).   ·  Plan: on pioglitazone and alogliptoin at sera  started ISS  monitor BG.     Problem/Plan - 4:  ·  Problem: CAD (coronary atherosclerotic disease).   ·  Plan: h/o CAD s/p stent on plavix  c/w home meds.     Problem/Plan - 5:  ·  Problem: BPH (benign prostatic hyperplasia).   ·  Plan: on tamsulosin at home  c/w home meds.     Problem/Plan - 6:  ·  Problem: Prophylactic measure.   ·  Plan: DVT prop: lovenox sq.    
INTERVAL HPI/OVERNIGHT EVENTS:  Patient seen,stable ,no events  VITAL SIGNS:  T(F): 97.6 (10-16-24 @ 05:14)  HR: 66 (10-16-24 @ 05:14)  BP: 129/65 (10-16-24 @ 05:14)  RR: 17 (10-16-24 @ 05:14)  SpO2: 96% (10-16-24 @ 05:14)  Wt(kg): --    PHYSICAL EXAM:  awake  Constitutional:  Eyes:  ENMT:perrla  Neck:  Respiratory:clear  Cardiovascular:s1s2,m-none  Gastrointestinal:soft,bs pos  Extremities:  Vascular:  Neurological:no focal deficit  Musculoskeletal:    MEDICATIONS  (STANDING):  ascorbic acid 500 milliGRAM(s) Oral daily  clopidogrel Tablet 75 milliGRAM(s) Oral daily  enoxaparin Injectable 30 milliGRAM(s) SubCutaneous every 24 hours  influenza  Vaccine (HIGH DOSE) 0.5 milliLiter(s) IntraMuscular once  insulin lispro (ADMELOG) corrective regimen sliding scale   SubCutaneous three times a day before meals  insulin lispro (ADMELOG) corrective regimen sliding scale   SubCutaneous at bedtime  losartan 100 milliGRAM(s) Oral daily  metoprolol succinate ER 50 milliGRAM(s) Oral daily  multivitamin 1 Tablet(s) Oral daily  tamsulosin 0.4 milliGRAM(s) Oral at bedtime  zinc sulfate 220 milliGRAM(s) Oral daily    MEDICATIONS  (PRN):      Allergies    penicillin (Rash)    Intolerances        LABS:                        9.2    4.20  )-----------( 155      ( 14 Oct 2024 21:11 )             29.4     10-14    133[L]  |  102  |  52[H]  ----------------------------<  103[H]  4.7   |  26  |  1.15    Ca    8.7      14 Oct 2024 21:11    TPro  7.2  /  Alb  3.0[L]  /  TBili  0.6  /  DBili  x   /  AST  26  /  ALT  44  /  AlkPhos  69  10-14      Urinalysis Basic - ( 14 Oct 2024 21:11 )    Color: x / Appearance: x / SG: x / pH: x  Gluc: 103 mg/dL / Ketone: x  / Bili: x / Urobili: x   Blood: x / Protein: x / Nitrite: x   Leuk Esterase: x / RBC: x / WBC x   Sq Epi: x / Non Sq Epi: x / Bacteria: x        RADIOLOGY & ADDITIONAL TESTS:       Assessment:  · Assessment	  89y/M, from [facility], with PMH of  HTN, HLD, DM, PVD, CAD s/p stent, BPH was sent to [facility] from [facility] after a fall 2 weeks ago. Patient does not want to stay in and wants to go home, he was told to go to hospital for evaluation before they can send him home, that why patient is here. Admitted for PT evaluation regarding placement         Problem/Plan - 1:  ·  Problem: Multiple falls.   ·  Plan: was sent to rehab after a fall 2 weeks ago  has h/o mulitple fall  here because pt does not want to stay in rehab and wants to go home  -PT consult appret with recomendat to STR  - fall precaution.  -to d/w patient d/c planing     Problem/Plan - 2:  ·  Problem: HTN (hypertension).   ·  Plan: on Telmisartan and metoprolol at home  c/w home meds with holding parameters.     Problem/Plan - 3:  ·  Problem: DM (diabetes mellitus).   ·  Plan: on pioglitazone and alogliptoin at sera  started ISS  monitor BG.     Problem/Plan - 4:  ·  Problem: CAD (coronary atherosclerotic disease).   ·  Plan: h/o CAD s/p stent on plavix  c/w home meds.     Problem/Plan - 5:  ·  Problem: BPH (benign prostatic hyperplasia).   ·  Plan: on tamsulosin at home  c/w home meds.     Problem/Plan - 6:  ·  Problem: Prophylactic measure.   ·  Plan: DVT prop: lovenox sq.        
Patient is a 89y old  Male who presents with a chief complaint of Unsteadiness on feet     (15 Oct 2024 12:02)    OVERNIGHT EVENTS: no acute changes.     Pt is aox3, comfortable appearing, tolerating PO, ambulates with cane.     REVIEW OF SYSTEMS:  CONSTITUTIONAL: No fever, chills  ENMT:  +difficulty hearing, no change in vision  NECK: No pain or stiffness  RESPIRATORY: No cough, SOB  CARDIOVASCULAR: No chest pain, palpitations  GASTROINTESTINAL: No abdominal pain. No nausea, vomiting, or diarrhea  GENITOURINARY: No dysuria  NEUROLOGICAL: No HA  SKIN: No itching, burning, rashes, or lesions   LYMPH NODES: No enlarged glands  ENDOCRINE: No heat or cold intolerance; No hair loss  MUSCULOSKELETAL: No joint pain or swelling; No muscle, back, or extremity pain  PSYCHIATRIC: No depression, anxiety  HEME/LYMPH: No easy bruising, or bleeding gums    T(C): 37.1 (10-15-24 @ 12:14), Max: 37.1 (10-15-24 @ 12:14)  HR: 64 (10-15-24 @ 12:14) (58 - 84)  BP: 123/59 (10-15-24 @ 12:14) (123/59 - 136/56)  RR: 18 (10-15-24 @ 12:14) (16 - 19)  SpO2: 98% (10-15-24 @ 12:14) (98% - 100%)  Wt(kg): --Vital Signs Last 24 Hrs  T(C): 37.1 (15 Oct 2024 12:14), Max: 37.1 (15 Oct 2024 12:14)  T(F): 98.7 (15 Oct 2024 12:14), Max: 98.7 (15 Oct 2024 12:14)  HR: 64 (15 Oct 2024 12:14) (58 - 84)  BP: 123/59 (15 Oct 2024 12:14) (123/59 - 136/56)  BP(mean): --  RR: 18 (15 Oct 2024 12:14) (16 - 19)  SpO2: 98% (15 Oct 2024 12:14) (98% - 100%)    Parameters below as of 15 Oct 2024 12:14  Patient On (Oxygen Delivery Method): room air        MEDICATIONS  (STANDING):  ascorbic acid 500 milliGRAM(s) Oral daily  clopidogrel Tablet 75 milliGRAM(s) Oral daily  enoxaparin Injectable 30 milliGRAM(s) SubCutaneous every 24 hours  influenza  Vaccine (HIGH DOSE) 0.5 milliLiter(s) IntraMuscular once  insulin lispro (ADMELOG) corrective regimen sliding scale   SubCutaneous at bedtime  insulin lispro (ADMELOG) corrective regimen sliding scale   SubCutaneous three times a day before meals  losartan 100 milliGRAM(s) Oral daily  metoprolol succinate ER 50 milliGRAM(s) Oral daily  multivitamin 1 Tablet(s) Oral daily  tamsulosin 0.4 milliGRAM(s) Oral at bedtime  zinc sulfate 220 milliGRAM(s) Oral daily    MEDICATIONS  (PRN):      PHYSICAL EXAM:  GENERAL: NAD  EYES: clear conjunctiva  ENMT: Moist mucous membranes  NECK: Supple, No JVD, Normal thyroid  CHEST/LUNG: Clear to auscultation bilaterally; No rales, rhonchi, wheezing, or rubs  HEART: S1, S2, Regular rate and rhythm  ABDOMEN: Soft, Nontender, Nondistended; Bowel sounds present  NEURO: Alert & Oriented X3  EXTREMITIES: No LE edema, no calf tenderness  LYMPH: No lymphadenopathy noted  SKIN: No rashes or lesions    Consultant(s) Notes Reviewed:  [x ] YES  [ ] NO  Care Discussed with Consultants/Other Providers [ x] YES  [ ] NO    LABS:                        9.2    4.20  )-----------( 155      ( 14 Oct 2024 21:11 )             29.4     10-14    133[L]  |  102  |  52[H]  ----------------------------<  103[H]  4.7   |  26  |  1.15    Ca    8.7      14 Oct 2024 21:11    TPro  7.2  /  Alb  3.0[L]  /  TBili  0.6  /  DBili  x   /  AST  26  /  ALT  44  /  AlkPhos  69  10-14      CAPILLARY BLOOD GLUCOSE      POCT Blood Glucose.: 106 mg/dL (15 Oct 2024 11:40)        Urinalysis Basic - ( 14 Oct 2024 21:11 )    Color: x / Appearance: x / SG: x / pH: x  Gluc: 103 mg/dL / Ketone: x  / Bili: x / Urobili: x   Blood: x / Protein: x / Nitrite: x   Leuk Esterase: x / RBC: x / WBC x   Sq Epi: x / Non Sq Epi: x / Bacteria: x        RADIOLOGY & ADDITIONAL TESTS:  no new tests     Imaging Personally Reviewed:  [ ] YES  [ ] NO

## 2024-10-16 NOTE — DISCHARGE NOTE PROVIDER - NSDCCPCAREPLAN_GEN_ALL_CORE_FT
PRINCIPAL DISCHARGE DIAGNOSIS  Diagnosis: Gait instability  Assessment and Plan of Treatment: you have a history of falling at home  you were recently in the rehab facility but you refused to stay  you were seen by a physical therapist and still recommended rehab  you are now going home with home care services such as physical therapy      SECONDARY DISCHARGE DIAGNOSES  Diagnosis: CAD (coronary atherosclerotic disease)  Assessment and Plan of Treatment: continue taking your home medication plavix  follow up with your Cardiologist Dr. Rashid    Diagnosis: HTN (hypertension)  Assessment and Plan of Treatment: continue taking your home medications - losartan and metoprolol    Diagnosis: DM (diabetes mellitus)  Assessment and Plan of Treatment: continue taking your home medications - pioglitazone and alogliptin    Diagnosis: BPH (benign prostatic hyperplasia)  Assessment and Plan of Treatment: continue taking your home medication flomax    Diagnosis: Anemia of chronic disease  Assessment and Plan of Treatment: your hemoglobin was 9.2   Eat healthy foods rich in iron and vitamin C. Nuts, meat, dark leafy green vegetables, and beans are high in iron and protein. Vitamin C helps your body absorb iron. Foods rich in vitamin C include oranges and other citrus fruits. Ask your healthcare provider for a list of other foods that are high in iron or vitamin C. Ask if you need to be on a special diet.  Call your local emergency number (911 in the [place]), or have someone call if:  You lose consciousness.  You have severe chest pain.  When should I seek immediate care?  You have dark or bloody bowel movements.      Diagnosis: Mild protein-calorie malnutrition  Assessment and Plan of Treatment: continue taking glucerna shakes 3 times a day     PRINCIPAL DISCHARGE DIAGNOSIS  Diagnosis: Gait instability  Assessment and Plan of Treatment: you have a history of multiple falls at home  you were recently admitted to a rehab facility but you requested to leave  you were seen by a physical therapist who still recommended rehab, you declined to go back to rehab.  You are being discharged home with Home care and home physical therapy. A walker was delivered to you at bedside      SECONDARY DISCHARGE DIAGNOSES  Diagnosis: Anemia of chronic disease  Assessment and Plan of Treatment: your hemoglobin was 9.2   Eat healthy foods rich in iron and vitamin C. Nuts, meat, dark leafy green vegetables, and beans are high in iron and protein. Vitamin C helps your body absorb iron. Foods rich in vitamin C include oranges and other citrus fruits. Ask your healthcare provider for a list of other foods that are high in iron or vitamin C. Ask if you need to be on a special diet.  Call your local emergency number (241 in the ), or have someone call if:  You lose consciousness.  You have severe chest pain.  When should I seek immediate care?  You have dark or bloody bowel movements.      Diagnosis: HTN (hypertension)  Assessment and Plan of Treatment: continue taking your home medications - losartan and metoprolol  Follow up with your primary care provider for ongoing management    Diagnosis: DM (diabetes mellitus)  Assessment and Plan of Treatment: continue taking your home medications - pioglitazone and alogliptin  follow up with your primary care provider for ongoing managment    Diagnosis: CAD (coronary atherosclerotic disease)  Assessment and Plan of Treatment: continue taking your home medication plavix  -Follow up with your Cardiologist Dr. Hamilton    Diagnosis: BPH (benign prostatic hyperplasia)  Assessment and Plan of Treatment: continue taking your home medication flomax    Diagnosis: Mild protein-calorie malnutrition  Assessment and Plan of Treatment: You were seen by a nutritionist who recomended supplements. Continue taking vitamins and drinking glucerna shakes 3 times a day

## 2024-10-16 NOTE — DISCHARGE NOTE PROVIDER - NSDCHHCONTACT_GEN_ALL_CORE_FT
21-Jan-2022 18:51
As certified below, I, or a nurse practitioner or physician assistant working with me, had a face-to-face encounter that meets the physician face-to-face encounter requirements.

## 2024-10-17 ENCOUNTER — TRANSCRIPTION ENCOUNTER (OUTPATIENT)
Age: 89
End: 2024-10-17

## 2024-10-17 VITALS
RESPIRATION RATE: 16 BRPM | DIASTOLIC BLOOD PRESSURE: 62 MMHG | OXYGEN SATURATION: 96 % | HEART RATE: 63 BPM | SYSTOLIC BLOOD PRESSURE: 108 MMHG | TEMPERATURE: 98 F

## 2024-10-17 LAB
GLUCOSE BLDC GLUCOMTR-MCNC: 144 MG/DL — HIGH (ref 70–99)
GLUCOSE BLDC GLUCOMTR-MCNC: 90 MG/DL — SIGNIFICANT CHANGE UP (ref 70–99)

## 2024-10-17 RX ADMIN — Medication 50 MILLIGRAM(S): at 05:30

## 2024-10-17 RX ADMIN — LOSARTAN POTASSIUM 100 MILLIGRAM(S): 100 TABLET, FILM COATED ORAL at 05:30

## 2024-10-17 NOTE — DISCHARGE NOTE NURSING/CASE MANAGEMENT/SOCIAL WORK - FINANCIAL ASSISTANCE
[facility] provides services at a reduced cost to those who are determined to be eligible through [facility]’s financial assistance program. Information regarding [facility]’s financial assistance program can be found by going to [email or web address] or by calling [phone].

## 2024-10-17 NOTE — DISCHARGE NOTE NURSING/CASE MANAGEMENT/SOCIAL WORK - NSDCFUADDAPPT_GEN_ALL_CORE_FT
APPTS ARE READY TO BE MADE: [X] YES    Best Family or Patient Contact (if needed): Patient Morton Goldberg @ [phone] or Sister Stefanie Carrasquillo @ [phone]    Additional Information about above appointments (if needed):    1: Dr. Hamilton routine follow up in 1-2 weeks    Other comments or requests: Patient is hard of hearing

## 2024-10-17 NOTE — DISCHARGE NOTE NURSING/CASE MANAGEMENT/SOCIAL WORK - PATIENT PORTAL LINK FT
You can access the FollowMyHealth Patient Portal offered by [facility] by registering at the following website: [email or web address]. By joining [facility]’s FollowMyHealth portal, you will also be able to view your health information using other applications (apps) compatible with our system.

## 2024-10-17 NOTE — DISCHARGE NOTE NURSING/CASE MANAGEMENT/SOCIAL WORK - NSDCVIVACCINE_GEN_ALL_CORE_FT
influenza, high-dose, quadrivalent; 04-Oct-2023 13:21; Mariah Hernandez (RN); [employer]; [id number] (Exp. Date: 30-Jun-2024); IntraMuscular; Deltoid Left.; 0.7 milliLiter(s); VIS (VIS Published: 06-Aug-2021, VIS Presented: 04-Oct-2023);

## 2024-10-20 LAB — PYRIDOXAL PHOS SERPL-MCNC: 8.9 UG/L — SIGNIFICANT CHANGE UP (ref 3.4–65.2)

## 2024-10-25 NOTE — CHART NOTE - NSCHARTNOTEFT_GEN_A_CORE
Patient was outreached but did not answer nor could a voicemail be left.    Family member answered on behalf of the patient and advised they will pass forward our details for the patient to return our call.
Inga negron [phone] called but patient did not want medical staff to give out his information.   All phone calls to be directed to patient.
Patient was outreached on 10/23 and 10/25, but did not answer nor could a voicemail be left.     Patient's sister was outreached on 10/25 but did not answer. A voicemail was left for the patient to return our call.

## 2024-11-26 PROCEDURE — 80061 LIPID PANEL: CPT

## 2024-11-26 PROCEDURE — 99285 EMERGENCY DEPT VISIT HI MDM: CPT

## 2024-11-26 PROCEDURE — 82306 VITAMIN D 25 HYDROXY: CPT

## 2024-11-26 PROCEDURE — 82746 ASSAY OF FOLIC ACID SERUM: CPT

## 2024-11-26 PROCEDURE — 83550 IRON BINDING TEST: CPT

## 2024-11-26 PROCEDURE — 85025 COMPLETE CBC W/AUTO DIFF WBC: CPT

## 2024-11-26 PROCEDURE — 82607 VITAMIN B-12: CPT

## 2024-11-26 PROCEDURE — 36415 COLL VENOUS BLD VENIPUNCTURE: CPT

## 2024-11-26 PROCEDURE — 84207 ASSAY OF VITAMIN B-6: CPT

## 2024-11-26 PROCEDURE — 83036 HEMOGLOBIN GLYCOSYLATED A1C: CPT

## 2024-11-26 PROCEDURE — 83540 ASSAY OF IRON: CPT

## 2024-11-26 PROCEDURE — 82728 ASSAY OF FERRITIN: CPT

## 2024-11-26 PROCEDURE — 82962 GLUCOSE BLOOD TEST: CPT

## 2024-11-26 PROCEDURE — 80053 COMPREHEN METABOLIC PANEL: CPT

## 2024-11-26 NOTE — ED ADULT TRIAGE NOTE - ESI TRIAGE ACUITY LEVEL, MLM
11/26/24 Patient does not look like she is being seen in [id number] but requested a call back from Alejandrina , states she has a few questions regarding new patients seeing Dr Dirk REGAN    3